# Patient Record
Sex: MALE | Race: ASIAN | NOT HISPANIC OR LATINO | URBAN - METROPOLITAN AREA
[De-identification: names, ages, dates, MRNs, and addresses within clinical notes are randomized per-mention and may not be internally consistent; named-entity substitution may affect disease eponyms.]

---

## 2017-06-16 ENCOUNTER — EMERGENCY (EMERGENCY)
Facility: HOSPITAL | Age: 77
LOS: 1 days | Discharge: HOME | End: 2017-06-16
Attending: EMERGENCY MEDICINE | Admitting: EMERGENCY MEDICINE
Payer: SELF-PAY

## 2017-06-16 ENCOUNTER — APPOINTMENT (OUTPATIENT)
Dept: OPHTHALMOLOGY | Facility: CLINIC | Age: 77
End: 2017-06-16

## 2017-06-16 VITALS
OXYGEN SATURATION: 100 % | RESPIRATION RATE: 15 BRPM | HEART RATE: 84 BPM | DIASTOLIC BLOOD PRESSURE: 82 MMHG | SYSTOLIC BLOOD PRESSURE: 135 MMHG | TEMPERATURE: 98 F

## 2017-06-16 DIAGNOSIS — Z95.1 PRESENCE OF AORTOCORONARY BYPASS GRAFT: Chronic | ICD-10-CM

## 2017-06-16 PROCEDURE — 99284 EMERGENCY DEPT VISIT MOD MDM: CPT

## 2017-06-16 RX ORDER — ACETAMINOPHEN 500 MG
650 TABLET ORAL EVERY 6 HOURS
Qty: 0 | Refills: 0 | Status: DISCONTINUED | OUTPATIENT
Start: 2017-06-16 | End: 2017-06-19

## 2017-06-16 RX ADMIN — Medication 650 MILLIGRAM(S): at 12:45

## 2017-06-16 RX ADMIN — Medication 650 MILLIGRAM(S): at 12:15

## 2017-06-16 NOTE — ED PROVIDER NOTE - OBJECTIVE STATEMENT
Pt speaks Mohawk. Pt requested daughter Beryl to translate.   77y M with hx of glaucoma (blind in the R eye), CABG presents to ED with L eye pain, discharge, itchiness and blurry vision since last night. Pt states he couldn't sleep last night. Notes he does not wear contact but wears glasses. Use to smoke and drink EtOH. Pt states he takes Azital? for the eye.   PMD: Dr. Weller

## 2017-06-16 NOTE — ED PROVIDER NOTE - PROGRESS NOTE DETAILS
I Ibrahima Krishna am scribing for and in the presence of Nakita Vallejo (Attending) the following section HPI, ROS, PAST MEDICAL/SURGICAL/FAMILY/SOCIAL HISTORY, PHYSICAL EXAM, and DISPOSITION. JACKY Haji: Pt signed out to me by chas MIRANDA. Plan to follow up opthal consult. Dr Vallejo spoke to opthal. Recommends to discharge pt from ED and pt to report to [address] [place] [address] [address]- opthal clinic. Pt agrees with plan.

## 2017-07-24 ENCOUNTER — APPOINTMENT (OUTPATIENT)
Dept: OPHTHALMOLOGY | Facility: CLINIC | Age: 77
End: 2017-07-24

## 2017-08-23 ENCOUNTER — APPOINTMENT (OUTPATIENT)
Dept: OPHTHALMOLOGY | Facility: CLINIC | Age: 77
End: 2017-08-23

## 2017-09-11 ENCOUNTER — APPOINTMENT (OUTPATIENT)
Dept: OPHTHALMOLOGY | Facility: CLINIC | Age: 77
End: 2017-09-11

## 2018-10-05 ENCOUNTER — APPOINTMENT (OUTPATIENT)
Dept: OPHTHALMOLOGY | Facility: CLINIC | Age: 78
End: 2018-10-05

## 2019-01-22 ENCOUNTER — APPOINTMENT (OUTPATIENT)
Dept: OPHTHALMOLOGY | Facility: CLINIC | Age: 79
End: 2019-01-22

## 2020-01-01 ENCOUNTER — TRANSCRIPTION ENCOUNTER (OUTPATIENT)
Age: 80
End: 2020-01-01

## 2020-01-01 ENCOUNTER — INPATIENT (INPATIENT)
Facility: HOSPITAL | Age: 80
LOS: 6 days | Discharge: HOME | End: 2020-11-18
Attending: INTERNAL MEDICINE | Admitting: INTERNAL MEDICINE
Payer: MEDICAID

## 2020-01-01 VITALS
OXYGEN SATURATION: 98 % | DIASTOLIC BLOOD PRESSURE: 46 MMHG | HEART RATE: 89 BPM | HEIGHT: 68 IN | TEMPERATURE: 98 F | RESPIRATION RATE: 16 BRPM | SYSTOLIC BLOOD PRESSURE: 78 MMHG

## 2020-01-01 VITALS — WEIGHT: 141.32 LBS

## 2020-01-01 DIAGNOSIS — H40.9 UNSPECIFIED GLAUCOMA: ICD-10-CM

## 2020-01-01 DIAGNOSIS — Z95.1 PRESENCE OF AORTOCORONARY BYPASS GRAFT: Chronic | ICD-10-CM

## 2020-01-01 DIAGNOSIS — N17.9 ACUTE KIDNEY FAILURE, UNSPECIFIED: ICD-10-CM

## 2020-01-01 DIAGNOSIS — I25.10 ATHEROSCLEROTIC HEART DISEASE OF NATIVE CORONARY ARTERY WITHOUT ANGINA PECTORIS: ICD-10-CM

## 2020-01-01 DIAGNOSIS — I95.9 HYPOTENSION, UNSPECIFIED: ICD-10-CM

## 2020-01-01 DIAGNOSIS — R55 SYNCOPE AND COLLAPSE: ICD-10-CM

## 2020-01-01 DIAGNOSIS — Z29.9 ENCOUNTER FOR PROPHYLACTIC MEASURES, UNSPECIFIED: ICD-10-CM

## 2020-01-01 DIAGNOSIS — N18.30 CHRONIC KIDNEY DISEASE, STAGE 3 UNSPECIFIED: ICD-10-CM

## 2020-01-01 DIAGNOSIS — J44.9 CHRONIC OBSTRUCTIVE PULMONARY DISEASE, UNSPECIFIED: ICD-10-CM

## 2020-01-01 LAB
ALBUMIN SERPL ELPH-MCNC: 3 G/DL — LOW (ref 3.3–5)
ALBUMIN SERPL ELPH-MCNC: 3.9 G/DL — SIGNIFICANT CHANGE UP (ref 3.3–5)
ALP SERPL-CCNC: 51 U/L — SIGNIFICANT CHANGE UP (ref 40–120)
ALP SERPL-CCNC: 62 U/L — SIGNIFICANT CHANGE UP (ref 40–120)
ALT FLD-CCNC: 10 U/L — SIGNIFICANT CHANGE UP (ref 4–41)
ALT FLD-CCNC: 10 U/L — SIGNIFICANT CHANGE UP (ref 4–41)
ANION GAP SERPL CALC-SCNC: 10 MMO/L — SIGNIFICANT CHANGE UP (ref 7–14)
ANION GAP SERPL CALC-SCNC: 10 MMO/L — SIGNIFICANT CHANGE UP (ref 7–14)
ANION GAP SERPL CALC-SCNC: 11 MMO/L — SIGNIFICANT CHANGE UP (ref 7–14)
ANION GAP SERPL CALC-SCNC: 12 MMO/L — SIGNIFICANT CHANGE UP (ref 7–14)
ANION GAP SERPL CALC-SCNC: 12 MMO/L — SIGNIFICANT CHANGE UP (ref 7–14)
ANION GAP SERPL CALC-SCNC: 13 MMO/L — SIGNIFICANT CHANGE UP (ref 7–14)
ANION GAP SERPL CALC-SCNC: 14 MMO/L — SIGNIFICANT CHANGE UP (ref 7–14)
APPEARANCE UR: CLEAR — SIGNIFICANT CHANGE UP
AST SERPL-CCNC: 19 U/L — SIGNIFICANT CHANGE UP (ref 4–40)
AST SERPL-CCNC: 23 U/L — SIGNIFICANT CHANGE UP (ref 4–40)
BACTERIA # UR AUTO: NEGATIVE — SIGNIFICANT CHANGE UP
BASE EXCESS BLDV CALC-SCNC: -5.2 MMOL/L — SIGNIFICANT CHANGE UP
BASOPHILS # BLD AUTO: 0.02 K/UL — SIGNIFICANT CHANGE UP (ref 0–0.2)
BASOPHILS # BLD AUTO: 0.06 K/UL — SIGNIFICANT CHANGE UP (ref 0–0.2)
BASOPHILS NFR BLD AUTO: 0.4 % — SIGNIFICANT CHANGE UP (ref 0–2)
BASOPHILS NFR BLD AUTO: 0.7 % — SIGNIFICANT CHANGE UP (ref 0–2)
BILIRUB SERPL-MCNC: 0.5 MG/DL — SIGNIFICANT CHANGE UP (ref 0.2–1.2)
BILIRUB SERPL-MCNC: 0.6 MG/DL — SIGNIFICANT CHANGE UP (ref 0.2–1.2)
BILIRUB UR-MCNC: NEGATIVE — SIGNIFICANT CHANGE UP
BLOOD GAS VENOUS - CREATININE: 2.99 MG/DL — HIGH (ref 0.5–1.3)
BLOOD UR QL VISUAL: NEGATIVE — SIGNIFICANT CHANGE UP
BUN SERPL-MCNC: 22 MG/DL — SIGNIFICANT CHANGE UP (ref 7–23)
BUN SERPL-MCNC: 24 MG/DL — HIGH (ref 7–23)
BUN SERPL-MCNC: 25 MG/DL — HIGH (ref 7–23)
BUN SERPL-MCNC: 25 MG/DL — HIGH (ref 7–23)
BUN SERPL-MCNC: 27 MG/DL — HIGH (ref 7–23)
BUN SERPL-MCNC: 28 MG/DL — HIGH (ref 7–23)
BUN SERPL-MCNC: 30 MG/DL — HIGH (ref 7–23)
BUN SERPL-MCNC: 31 MG/DL — HIGH (ref 7–23)
BUN SERPL-MCNC: 32 MG/DL — HIGH (ref 7–23)
CALCIUM SERPL-MCNC: 8.2 MG/DL — LOW (ref 8.4–10.5)
CALCIUM SERPL-MCNC: 8.4 MG/DL — SIGNIFICANT CHANGE UP (ref 8.4–10.5)
CALCIUM SERPL-MCNC: 8.9 MG/DL — SIGNIFICANT CHANGE UP (ref 8.4–10.5)
CALCIUM SERPL-MCNC: 9 MG/DL — SIGNIFICANT CHANGE UP (ref 8.4–10.5)
CALCIUM SERPL-MCNC: 9.2 MG/DL — SIGNIFICANT CHANGE UP (ref 8.4–10.5)
CALCIUM SERPL-MCNC: 9.2 MG/DL — SIGNIFICANT CHANGE UP (ref 8.4–10.5)
CALCIUM SERPL-MCNC: 9.3 MG/DL — SIGNIFICANT CHANGE UP (ref 8.4–10.5)
CHLORIDE BLDV-SCNC: 109 MMOL/L — HIGH (ref 96–108)
CHLORIDE SERPL-SCNC: 103 MMOL/L — SIGNIFICANT CHANGE UP (ref 98–107)
CHLORIDE SERPL-SCNC: 107 MMOL/L — SIGNIFICANT CHANGE UP (ref 98–107)
CHLORIDE SERPL-SCNC: 107 MMOL/L — SIGNIFICANT CHANGE UP (ref 98–107)
CHLORIDE SERPL-SCNC: 108 MMOL/L — HIGH (ref 98–107)
CHLORIDE SERPL-SCNC: 108 MMOL/L — HIGH (ref 98–107)
CHLORIDE SERPL-SCNC: 109 MMOL/L — HIGH (ref 98–107)
CHLORIDE SERPL-SCNC: 109 MMOL/L — HIGH (ref 98–107)
CHLORIDE SERPL-SCNC: 110 MMOL/L — HIGH (ref 98–107)
CHLORIDE SERPL-SCNC: 110 MMOL/L — HIGH (ref 98–107)
CK MB BLD-MCNC: 2.88 NG/ML — SIGNIFICANT CHANGE UP (ref 1–6.6)
CK MB BLD-MCNC: 3.08 NG/ML — SIGNIFICANT CHANGE UP (ref 1–6.6)
CK MB BLD-MCNC: 3.31 NG/ML — SIGNIFICANT CHANGE UP (ref 1–6.6)
CK MB BLD-MCNC: SIGNIFICANT CHANGE UP (ref 0–2.5)
CK MB BLD-MCNC: SIGNIFICANT CHANGE UP (ref 0–2.5)
CK SERPL-CCNC: 68 U/L — SIGNIFICANT CHANGE UP (ref 30–200)
CK SERPL-CCNC: 72 U/L — SIGNIFICANT CHANGE UP (ref 30–200)
CK SERPL-CCNC: 89 U/L — SIGNIFICANT CHANGE UP (ref 30–200)
CO2 SERPL-SCNC: 16 MMOL/L — LOW (ref 22–31)
CO2 SERPL-SCNC: 17 MMOL/L — LOW (ref 22–31)
CO2 SERPL-SCNC: 18 MMOL/L — LOW (ref 22–31)
CO2 SERPL-SCNC: 19 MMOL/L — LOW (ref 22–31)
COLOR SPEC: SIGNIFICANT CHANGE UP
CREAT SERPL-MCNC: 2.26 MG/DL — HIGH (ref 0.5–1.3)
CREAT SERPL-MCNC: 2.28 MG/DL — HIGH (ref 0.5–1.3)
CREAT SERPL-MCNC: 2.44 MG/DL — HIGH (ref 0.5–1.3)
CREAT SERPL-MCNC: 2.47 MG/DL — HIGH (ref 0.5–1.3)
CREAT SERPL-MCNC: 2.51 MG/DL — HIGH (ref 0.5–1.3)
CREAT SERPL-MCNC: 2.67 MG/DL — HIGH (ref 0.5–1.3)
CREAT SERPL-MCNC: 2.71 MG/DL — HIGH (ref 0.5–1.3)
CREAT SERPL-MCNC: 2.78 MG/DL — HIGH (ref 0.5–1.3)
CREAT SERPL-MCNC: 2.98 MG/DL — HIGH (ref 0.5–1.3)
EOSINOPHIL # BLD AUTO: 0.41 K/UL — SIGNIFICANT CHANGE UP (ref 0–0.5)
EOSINOPHIL # BLD AUTO: 0.63 K/UL — HIGH (ref 0–0.5)
EOSINOPHIL NFR BLD AUTO: 7 % — HIGH (ref 0–6)
EOSINOPHIL NFR BLD AUTO: 7.2 % — HIGH (ref 0–6)
FOLATE SERPL-MCNC: 7.3 NG/ML — SIGNIFICANT CHANGE UP (ref 4.7–20)
GAS PNL BLDV: 132 MMOL/L — LOW (ref 136–146)
GLUCOSE BLDV-MCNC: 97 MG/DL — SIGNIFICANT CHANGE UP (ref 70–99)
GLUCOSE SERPL-MCNC: 109 MG/DL — HIGH (ref 70–99)
GLUCOSE SERPL-MCNC: 128 MG/DL — HIGH (ref 70–99)
GLUCOSE SERPL-MCNC: 74 MG/DL — SIGNIFICANT CHANGE UP (ref 70–99)
GLUCOSE SERPL-MCNC: 79 MG/DL — SIGNIFICANT CHANGE UP (ref 70–99)
GLUCOSE SERPL-MCNC: 83 MG/DL — SIGNIFICANT CHANGE UP (ref 70–99)
GLUCOSE SERPL-MCNC: 91 MG/DL — SIGNIFICANT CHANGE UP (ref 70–99)
GLUCOSE SERPL-MCNC: 92 MG/DL — SIGNIFICANT CHANGE UP (ref 70–99)
GLUCOSE SERPL-MCNC: 94 MG/DL — SIGNIFICANT CHANGE UP (ref 70–99)
GLUCOSE SERPL-MCNC: 95 MG/DL — SIGNIFICANT CHANGE UP (ref 70–99)
GLUCOSE UR-MCNC: NEGATIVE — SIGNIFICANT CHANGE UP
HCO3 BLDV-SCNC: 19 MMOL/L — LOW (ref 20–27)
HCT VFR BLD CALC: 25.8 % — LOW (ref 39–50)
HCT VFR BLD CALC: 28.1 % — LOW (ref 39–50)
HCT VFR BLD CALC: 30.2 % — LOW (ref 39–50)
HCT VFR BLD CALC: 30.6 % — LOW (ref 39–50)
HCT VFR BLD CALC: 31.1 % — LOW (ref 39–50)
HCT VFR BLD CALC: 31.2 % — LOW (ref 39–50)
HCT VFR BLD CALC: 31.2 % — LOW (ref 39–50)
HCT VFR BLD CALC: 32.5 % — LOW (ref 39–50)
HCT VFR BLD CALC: 33.5 % — LOW (ref 39–50)
HCT VFR BLDV CALC: 34.2 % — LOW (ref 39–51)
HCYS SERPL-MCNC: 26.3 UMOL/L — HIGH
HGB BLD-MCNC: 10.8 G/DL — LOW (ref 13–17)
HGB BLD-MCNC: 8.1 G/DL — LOW (ref 13–17)
HGB BLD-MCNC: 8.9 G/DL — LOW (ref 13–17)
HGB BLD-MCNC: 9.4 G/DL — LOW (ref 13–17)
HGB BLD-MCNC: 9.5 G/DL — LOW (ref 13–17)
HGB BLD-MCNC: 9.6 G/DL — LOW (ref 13–17)
HGB BLD-MCNC: 9.7 G/DL — LOW (ref 13–17)
HGB BLD-MCNC: 9.7 G/DL — LOW (ref 13–17)
HGB BLD-MCNC: 9.8 G/DL — LOW (ref 13–17)
HGB BLDV-MCNC: 11.1 G/DL — LOW (ref 13–17)
HYALINE CASTS # UR AUTO: NEGATIVE — SIGNIFICANT CHANGE UP
IMM GRANULOCYTES NFR BLD AUTO: 1.4 % — SIGNIFICANT CHANGE UP (ref 0–1.5)
IMM GRANULOCYTES NFR BLD AUTO: 2.6 % — HIGH (ref 0–1.5)
KETONES UR-MCNC: NEGATIVE — SIGNIFICANT CHANGE UP
LACTATE BLDV-MCNC: 1.4 MMOL/L — SIGNIFICANT CHANGE UP (ref 0.5–2)
LEUKOCYTE ESTERASE UR-ACNC: NEGATIVE — SIGNIFICANT CHANGE UP
LYMPHOCYTES # BLD AUTO: 1.36 K/UL — SIGNIFICANT CHANGE UP (ref 1–3.3)
LYMPHOCYTES # BLD AUTO: 2.06 K/UL — SIGNIFICANT CHANGE UP (ref 1–3.3)
LYMPHOCYTES # BLD AUTO: 23 % — SIGNIFICANT CHANGE UP (ref 13–44)
LYMPHOCYTES # BLD AUTO: 24 % — SIGNIFICANT CHANGE UP (ref 13–44)
MAGNESIUM SERPL-MCNC: 1.7 MG/DL — SIGNIFICANT CHANGE UP (ref 1.6–2.6)
MAGNESIUM SERPL-MCNC: 1.8 MG/DL — SIGNIFICANT CHANGE UP (ref 1.6–2.6)
MAGNESIUM SERPL-MCNC: 2 MG/DL — SIGNIFICANT CHANGE UP (ref 1.6–2.6)
MAGNESIUM SERPL-MCNC: 2.1 MG/DL — SIGNIFICANT CHANGE UP (ref 1.6–2.6)
MAGNESIUM SERPL-MCNC: 2.3 MG/DL — SIGNIFICANT CHANGE UP (ref 1.6–2.6)
MCHC RBC-ENTMCNC: 28.4 PG — SIGNIFICANT CHANGE UP (ref 27–34)
MCHC RBC-ENTMCNC: 28.5 PG — SIGNIFICANT CHANGE UP (ref 27–34)
MCHC RBC-ENTMCNC: 28.7 PG — SIGNIFICANT CHANGE UP (ref 27–34)
MCHC RBC-ENTMCNC: 28.9 PG — SIGNIFICANT CHANGE UP (ref 27–34)
MCHC RBC-ENTMCNC: 29 PG — SIGNIFICANT CHANGE UP (ref 27–34)
MCHC RBC-ENTMCNC: 29.1 PG — SIGNIFICANT CHANGE UP (ref 27–34)
MCHC RBC-ENTMCNC: 29.3 PG — SIGNIFICANT CHANGE UP (ref 27–34)
MCHC RBC-ENTMCNC: 30.2 % — LOW (ref 32–36)
MCHC RBC-ENTMCNC: 30.5 % — LOW (ref 32–36)
MCHC RBC-ENTMCNC: 30.7 % — LOW (ref 32–36)
MCHC RBC-ENTMCNC: 31.1 % — LOW (ref 32–36)
MCHC RBC-ENTMCNC: 31.1 % — LOW (ref 32–36)
MCHC RBC-ENTMCNC: 31.4 % — LOW (ref 32–36)
MCHC RBC-ENTMCNC: 31.7 % — LOW (ref 32–36)
MCHC RBC-ENTMCNC: 31.8 % — LOW (ref 32–36)
MCHC RBC-ENTMCNC: 32.2 % — SIGNIFICANT CHANGE UP (ref 32–36)
MCV RBC AUTO: 91 FL — SIGNIFICANT CHANGE UP (ref 80–100)
MCV RBC AUTO: 91.5 FL — SIGNIFICANT CHANGE UP (ref 80–100)
MCV RBC AUTO: 92.1 FL — SIGNIFICANT CHANGE UP (ref 80–100)
MCV RBC AUTO: 93.4 FL — SIGNIFICANT CHANGE UP (ref 80–100)
MCV RBC AUTO: 93.4 FL — SIGNIFICANT CHANGE UP (ref 80–100)
MCV RBC AUTO: 93.6 FL — SIGNIFICANT CHANGE UP (ref 80–100)
MCV RBC AUTO: 96.2 FL — SIGNIFICANT CHANGE UP (ref 80–100)
METHYLMALONATE SERPL-SCNC: 799 NMOL/L — HIGH (ref 0–378)
MONOCYTES # BLD AUTO: 0.47 K/UL — SIGNIFICANT CHANGE UP (ref 0–0.9)
MONOCYTES # BLD AUTO: 0.79 K/UL — SIGNIFICANT CHANGE UP (ref 0–0.9)
MONOCYTES NFR BLD AUTO: 8.3 % — SIGNIFICANT CHANGE UP (ref 2–14)
MONOCYTES NFR BLD AUTO: 8.8 % — SIGNIFICANT CHANGE UP (ref 2–14)
NEUTROPHILS # BLD AUTO: 3.32 K/UL — SIGNIFICANT CHANGE UP (ref 1.8–7.4)
NEUTROPHILS # BLD AUTO: 5.18 K/UL — SIGNIFICANT CHANGE UP (ref 1.8–7.4)
NEUTROPHILS NFR BLD AUTO: 57.9 % — SIGNIFICANT CHANGE UP (ref 43–77)
NEUTROPHILS NFR BLD AUTO: 58.7 % — SIGNIFICANT CHANGE UP (ref 43–77)
NITRITE UR-MCNC: NEGATIVE — SIGNIFICANT CHANGE UP
NRBC # FLD: 0 K/UL — SIGNIFICANT CHANGE UP (ref 0–0)
NT-PROBNP SERPL-SCNC: 1034 PG/ML — SIGNIFICANT CHANGE UP
PCO2 BLDV: 44 MMHG — SIGNIFICANT CHANGE UP (ref 41–51)
PH BLDV: 7.29 PH — LOW (ref 7.32–7.43)
PH UR: 6.5 — SIGNIFICANT CHANGE UP (ref 5–8)
PHOSPHATE SERPL-MCNC: 3 MG/DL — SIGNIFICANT CHANGE UP (ref 2.5–4.5)
PHOSPHATE SERPL-MCNC: 3.2 MG/DL — SIGNIFICANT CHANGE UP (ref 2.5–4.5)
PHOSPHATE SERPL-MCNC: 3.7 MG/DL — SIGNIFICANT CHANGE UP (ref 2.5–4.5)
PHOSPHATE SERPL-MCNC: 4.1 MG/DL — SIGNIFICANT CHANGE UP (ref 2.5–4.5)
PHOSPHATE SERPL-MCNC: 4.3 MG/DL — SIGNIFICANT CHANGE UP (ref 2.5–4.5)
PLATELET # BLD AUTO: 161 K/UL — SIGNIFICANT CHANGE UP (ref 150–400)
PLATELET # BLD AUTO: 166 K/UL — SIGNIFICANT CHANGE UP (ref 150–400)
PLATELET # BLD AUTO: 179 K/UL — SIGNIFICANT CHANGE UP (ref 150–400)
PLATELET # BLD AUTO: 180 K/UL — SIGNIFICANT CHANGE UP (ref 150–400)
PLATELET # BLD AUTO: 182 K/UL — SIGNIFICANT CHANGE UP (ref 150–400)
PLATELET # BLD AUTO: 186 K/UL — SIGNIFICANT CHANGE UP (ref 150–400)
PLATELET # BLD AUTO: 187 K/UL — SIGNIFICANT CHANGE UP (ref 150–400)
PLATELET # BLD AUTO: 188 K/UL — SIGNIFICANT CHANGE UP (ref 150–400)
PLATELET # BLD AUTO: 250 K/UL — SIGNIFICANT CHANGE UP (ref 150–400)
PMV BLD: 8.8 FL — SIGNIFICANT CHANGE UP (ref 7–13)
PMV BLD: 9 FL — SIGNIFICANT CHANGE UP (ref 7–13)
PMV BLD: 9.1 FL — SIGNIFICANT CHANGE UP (ref 7–13)
PMV BLD: 9.3 FL — SIGNIFICANT CHANGE UP (ref 7–13)
PMV BLD: 9.4 FL — SIGNIFICANT CHANGE UP (ref 7–13)
PMV BLD: 9.5 FL — SIGNIFICANT CHANGE UP (ref 7–13)
PMV BLD: 9.5 FL — SIGNIFICANT CHANGE UP (ref 7–13)
PMV BLD: 9.6 FL — SIGNIFICANT CHANGE UP (ref 7–13)
PMV BLD: 9.6 FL — SIGNIFICANT CHANGE UP (ref 7–13)
PO2 BLDV: < 24 MMHG — LOW (ref 35–40)
POTASSIUM BLDV-SCNC: 3.6 MMOL/L — SIGNIFICANT CHANGE UP (ref 3.4–4.5)
POTASSIUM SERPL-MCNC: 3.3 MMOL/L — LOW (ref 3.5–5.3)
POTASSIUM SERPL-MCNC: 3.5 MMOL/L — SIGNIFICANT CHANGE UP (ref 3.5–5.3)
POTASSIUM SERPL-MCNC: 3.6 MMOL/L — SIGNIFICANT CHANGE UP (ref 3.5–5.3)
POTASSIUM SERPL-MCNC: 3.7 MMOL/L — SIGNIFICANT CHANGE UP (ref 3.5–5.3)
POTASSIUM SERPL-MCNC: 3.8 MMOL/L — SIGNIFICANT CHANGE UP (ref 3.5–5.3)
POTASSIUM SERPL-MCNC: 3.8 MMOL/L — SIGNIFICANT CHANGE UP (ref 3.5–5.3)
POTASSIUM SERPL-MCNC: 3.9 MMOL/L — SIGNIFICANT CHANGE UP (ref 3.5–5.3)
POTASSIUM SERPL-SCNC: 3.3 MMOL/L — LOW (ref 3.5–5.3)
POTASSIUM SERPL-SCNC: 3.5 MMOL/L — SIGNIFICANT CHANGE UP (ref 3.5–5.3)
POTASSIUM SERPL-SCNC: 3.6 MMOL/L — SIGNIFICANT CHANGE UP (ref 3.5–5.3)
POTASSIUM SERPL-SCNC: 3.7 MMOL/L — SIGNIFICANT CHANGE UP (ref 3.5–5.3)
POTASSIUM SERPL-SCNC: 3.8 MMOL/L — SIGNIFICANT CHANGE UP (ref 3.5–5.3)
POTASSIUM SERPL-SCNC: 3.8 MMOL/L — SIGNIFICANT CHANGE UP (ref 3.5–5.3)
POTASSIUM SERPL-SCNC: 3.9 MMOL/L — SIGNIFICANT CHANGE UP (ref 3.5–5.3)
PROT SERPL-MCNC: 5.5 G/DL — LOW (ref 6–8.3)
PROT SERPL-MCNC: 6.6 G/DL — SIGNIFICANT CHANGE UP (ref 6–8.3)
PROT UR-MCNC: 50 — SIGNIFICANT CHANGE UP
PTH-INTACT SERPL-MCNC: 23.75 PG/ML — SIGNIFICANT CHANGE UP (ref 15–65)
RBC # BLD: 2.8 M/UL — LOW (ref 4.2–5.8)
RBC # BLD: 3.07 M/UL — LOW (ref 4.2–5.8)
RBC # BLD: 3.27 M/UL — LOW (ref 4.2–5.8)
RBC # BLD: 3.3 M/UL — LOW (ref 4.2–5.8)
RBC # BLD: 3.33 M/UL — LOW (ref 4.2–5.8)
RBC # BLD: 3.34 M/UL — LOW (ref 4.2–5.8)
RBC # BLD: 3.38 M/UL — LOW (ref 4.2–5.8)
RBC # BLD: 3.41 M/UL — LOW (ref 4.2–5.8)
RBC # BLD: 3.68 M/UL — LOW (ref 4.2–5.8)
RBC # FLD: 14.5 % — SIGNIFICANT CHANGE UP (ref 10.3–14.5)
RBC # FLD: 14.6 % — HIGH (ref 10.3–14.5)
RBC # FLD: 14.6 % — HIGH (ref 10.3–14.5)
RBC # FLD: 14.7 % — HIGH (ref 10.3–14.5)
RBC # FLD: 14.8 % — HIGH (ref 10.3–14.5)
RBC # FLD: 14.9 % — HIGH (ref 10.3–14.5)
RBC # FLD: 14.9 % — HIGH (ref 10.3–14.5)
RBC CASTS # UR COMP ASSIST: SIGNIFICANT CHANGE UP (ref 0–?)
SAO2 % BLDV: 19.4 % — LOW (ref 60–85)
SARS-COV-2 RNA SPEC QL NAA+PROBE: SIGNIFICANT CHANGE UP
SODIUM SERPL-SCNC: 134 MMOL/L — LOW (ref 135–145)
SODIUM SERPL-SCNC: 135 MMOL/L — SIGNIFICANT CHANGE UP (ref 135–145)
SODIUM SERPL-SCNC: 135 MMOL/L — SIGNIFICANT CHANGE UP (ref 135–145)
SODIUM SERPL-SCNC: 136 MMOL/L — SIGNIFICANT CHANGE UP (ref 135–145)
SODIUM SERPL-SCNC: 136 MMOL/L — SIGNIFICANT CHANGE UP (ref 135–145)
SODIUM SERPL-SCNC: 137 MMOL/L — SIGNIFICANT CHANGE UP (ref 135–145)
SODIUM SERPL-SCNC: 137 MMOL/L — SIGNIFICANT CHANGE UP (ref 135–145)
SODIUM SERPL-SCNC: 138 MMOL/L — SIGNIFICANT CHANGE UP (ref 135–145)
SODIUM SERPL-SCNC: 139 MMOL/L — SIGNIFICANT CHANGE UP (ref 135–145)
SP GR SPEC: 1.01 — SIGNIFICANT CHANGE UP (ref 1–1.04)
SQUAMOUS # UR AUTO: SIGNIFICANT CHANGE UP
TROPONIN T, HIGH SENSITIVITY: 114 NG/L — CRITICAL HIGH (ref ?–14)
TROPONIN T, HIGH SENSITIVITY: 89 NG/L — CRITICAL HIGH (ref ?–14)
TROPONIN T, HIGH SENSITIVITY: 91 NG/L — CRITICAL HIGH (ref ?–14)
TROPONIN T, HIGH SENSITIVITY: 93 NG/L — CRITICAL HIGH (ref ?–14)
TROPONIN T, HIGH SENSITIVITY: 95 NG/L — CRITICAL HIGH (ref ?–14)
UROBILINOGEN FLD QL: NORMAL — SIGNIFICANT CHANGE UP
VIT B12 SERPL-MCNC: 247 PG/ML — SIGNIFICANT CHANGE UP (ref 200–900)
WBC # BLD: 5.66 K/UL — SIGNIFICANT CHANGE UP (ref 3.8–10.5)
WBC # BLD: 5.71 K/UL — SIGNIFICANT CHANGE UP (ref 3.8–10.5)
WBC # BLD: 5.77 K/UL — SIGNIFICANT CHANGE UP (ref 3.8–10.5)
WBC # BLD: 5.99 K/UL — SIGNIFICANT CHANGE UP (ref 3.8–10.5)
WBC # BLD: 6.38 K/UL — SIGNIFICANT CHANGE UP (ref 3.8–10.5)
WBC # BLD: 6.66 K/UL — SIGNIFICANT CHANGE UP (ref 3.8–10.5)
WBC # BLD: 6.67 K/UL — SIGNIFICANT CHANGE UP (ref 3.8–10.5)
WBC # BLD: 7.28 K/UL — SIGNIFICANT CHANGE UP (ref 3.8–10.5)
WBC # BLD: 8.95 K/UL — SIGNIFICANT CHANGE UP (ref 3.8–10.5)
WBC # FLD AUTO: 5.66 K/UL — SIGNIFICANT CHANGE UP (ref 3.8–10.5)
WBC # FLD AUTO: 5.71 K/UL — SIGNIFICANT CHANGE UP (ref 3.8–10.5)
WBC # FLD AUTO: 5.77 K/UL — SIGNIFICANT CHANGE UP (ref 3.8–10.5)
WBC # FLD AUTO: 5.99 K/UL — SIGNIFICANT CHANGE UP (ref 3.8–10.5)
WBC # FLD AUTO: 6.38 K/UL — SIGNIFICANT CHANGE UP (ref 3.8–10.5)
WBC # FLD AUTO: 6.66 K/UL — SIGNIFICANT CHANGE UP (ref 3.8–10.5)
WBC # FLD AUTO: 6.67 K/UL — SIGNIFICANT CHANGE UP (ref 3.8–10.5)
WBC # FLD AUTO: 7.28 K/UL — SIGNIFICANT CHANGE UP (ref 3.8–10.5)
WBC # FLD AUTO: 8.95 K/UL — SIGNIFICANT CHANGE UP (ref 3.8–10.5)
WBC UR QL: SIGNIFICANT CHANGE UP (ref 0–?)

## 2020-01-01 PROCEDURE — 99223 1ST HOSP IP/OBS HIGH 75: CPT

## 2020-01-01 PROCEDURE — 71046 X-RAY EXAM CHEST 2 VIEWS: CPT | Mod: 26

## 2020-01-01 PROCEDURE — 99284 EMERGENCY DEPT VISIT MOD MDM: CPT

## 2020-01-01 PROCEDURE — 76770 US EXAM ABDO BACK WALL COMP: CPT | Mod: 26

## 2020-01-01 PROCEDURE — 93018 CV STRESS TEST I&R ONLY: CPT | Mod: GC

## 2020-01-01 PROCEDURE — 78452 HT MUSCLE IMAGE SPECT MULT: CPT | Mod: 26

## 2020-01-01 PROCEDURE — 99222 1ST HOSP IP/OBS MODERATE 55: CPT | Mod: GC

## 2020-01-01 PROCEDURE — 70450 CT HEAD/BRAIN W/O DYE: CPT | Mod: 26

## 2020-01-01 PROCEDURE — 93306 TTE W/DOPPLER COMPLETE: CPT | Mod: 26

## 2020-01-01 PROCEDURE — 93016 CV STRESS TEST SUPVJ ONLY: CPT | Mod: GC

## 2020-01-01 RX ORDER — ASPIRIN/CALCIUM CARB/MAGNESIUM 324 MG
81 TABLET ORAL DAILY
Refills: 0 | Status: DISCONTINUED | OUTPATIENT
Start: 2020-01-01 | End: 2020-01-01

## 2020-01-01 RX ORDER — HEPARIN SODIUM 5000 [USP'U]/ML
5000 INJECTION INTRAVENOUS; SUBCUTANEOUS EVERY 8 HOURS
Refills: 0 | Status: DISCONTINUED | OUTPATIENT
Start: 2020-01-01 | End: 2020-01-01

## 2020-01-01 RX ORDER — CALCITRIOL 0.5 UG/1
0.25 CAPSULE ORAL DAILY
Refills: 0 | Status: DISCONTINUED | OUTPATIENT
Start: 2020-01-01 | End: 2020-01-01

## 2020-01-01 RX ORDER — POTASSIUM CHLORIDE 20 MEQ
40 PACKET (EA) ORAL ONCE
Refills: 0 | Status: COMPLETED | OUTPATIENT
Start: 2020-01-01 | End: 2020-01-01

## 2020-01-01 RX ORDER — SODIUM CHLORIDE 9 MG/ML
1000 INJECTION INTRAMUSCULAR; INTRAVENOUS; SUBCUTANEOUS
Refills: 0 | Status: DISCONTINUED | OUTPATIENT
Start: 2020-01-01 | End: 2020-01-01

## 2020-01-01 RX ORDER — BUDESONIDE AND FORMOTEROL FUMARATE DIHYDRATE 160; 4.5 UG/1; UG/1
2 AEROSOL RESPIRATORY (INHALATION)
Refills: 0 | Status: DISCONTINUED | OUTPATIENT
Start: 2020-01-01 | End: 2020-01-01

## 2020-01-01 RX ORDER — HYDRALAZINE HCL 50 MG
2.5 TABLET ORAL ONCE
Refills: 0 | Status: COMPLETED | OUTPATIENT
Start: 2020-01-01 | End: 2020-01-01

## 2020-01-01 RX ORDER — MIDODRINE HYDROCHLORIDE 2.5 MG/1
2.5 TABLET ORAL THREE TIMES A DAY
Refills: 0 | Status: DISCONTINUED | OUTPATIENT
Start: 2020-01-01 | End: 2020-01-01

## 2020-01-01 RX ORDER — LANOLIN ALCOHOL/MO/W.PET/CERES
3 CREAM (GRAM) TOPICAL AT BEDTIME
Refills: 0 | Status: DISCONTINUED | OUTPATIENT
Start: 2020-01-01 | End: 2020-01-01

## 2020-01-01 RX ORDER — ATORVASTATIN CALCIUM 80 MG/1
40 TABLET, FILM COATED ORAL AT BEDTIME
Refills: 0 | Status: DISCONTINUED | OUTPATIENT
Start: 2020-01-01 | End: 2020-01-01

## 2020-01-01 RX ORDER — SENNA PLUS 8.6 MG/1
2 TABLET ORAL AT BEDTIME
Refills: 0 | Status: DISCONTINUED | OUTPATIENT
Start: 2020-01-01 | End: 2020-01-01

## 2020-01-01 RX ORDER — HYDRALAZINE HCL 50 MG
5 TABLET ORAL ONCE
Refills: 0 | Status: COMPLETED | OUTPATIENT
Start: 2020-01-01 | End: 2020-01-01

## 2020-01-01 RX ORDER — SODIUM CHLORIDE 9 MG/ML
500 INJECTION INTRAMUSCULAR; INTRAVENOUS; SUBCUTANEOUS ONCE
Refills: 0 | Status: COMPLETED | OUTPATIENT
Start: 2020-01-01 | End: 2020-01-01

## 2020-01-01 RX ORDER — MAGNESIUM SULFATE 500 MG/ML
2 VIAL (ML) INJECTION ONCE
Refills: 0 | Status: COMPLETED | OUTPATIENT
Start: 2020-01-01 | End: 2020-01-01

## 2020-01-01 RX ORDER — ACETAMINOPHEN 500 MG
650 TABLET ORAL EVERY 6 HOURS
Refills: 0 | Status: DISCONTINUED | OUTPATIENT
Start: 2020-01-01 | End: 2020-01-01

## 2020-01-01 RX ADMIN — Medication 50 GRAM(S): at 09:54

## 2020-01-01 RX ADMIN — HEPARIN SODIUM 5000 UNIT(S): 5000 INJECTION INTRAVENOUS; SUBCUTANEOUS at 12:17

## 2020-01-01 RX ADMIN — HEPARIN SODIUM 5000 UNIT(S): 5000 INJECTION INTRAVENOUS; SUBCUTANEOUS at 05:15

## 2020-01-01 RX ADMIN — ATORVASTATIN CALCIUM 40 MILLIGRAM(S): 80 TABLET, FILM COATED ORAL at 21:48

## 2020-01-01 RX ADMIN — BUDESONIDE AND FORMOTEROL FUMARATE DIHYDRATE 2 PUFF(S): 160; 4.5 AEROSOL RESPIRATORY (INHALATION) at 21:48

## 2020-01-01 RX ADMIN — BUDESONIDE AND FORMOTEROL FUMARATE DIHYDRATE 2 PUFF(S): 160; 4.5 AEROSOL RESPIRATORY (INHALATION) at 08:15

## 2020-01-01 RX ADMIN — SODIUM CHLORIDE 70 MILLILITER(S): 9 INJECTION INTRAMUSCULAR; INTRAVENOUS; SUBCUTANEOUS at 12:17

## 2020-01-01 RX ADMIN — SODIUM CHLORIDE 100 MILLILITER(S): 9 INJECTION INTRAMUSCULAR; INTRAVENOUS; SUBCUTANEOUS at 10:28

## 2020-01-01 RX ADMIN — SODIUM CHLORIDE 50 MILLILITER(S): 9 INJECTION INTRAMUSCULAR; INTRAVENOUS; SUBCUTANEOUS at 00:02

## 2020-01-01 RX ADMIN — SODIUM CHLORIDE 70 MILLILITER(S): 9 INJECTION INTRAMUSCULAR; INTRAVENOUS; SUBCUTANEOUS at 18:52

## 2020-01-01 RX ADMIN — HEPARIN SODIUM 5000 UNIT(S): 5000 INJECTION INTRAVENOUS; SUBCUTANEOUS at 21:47

## 2020-01-01 RX ADMIN — Medication 40 MILLIEQUIVALENT(S): at 14:02

## 2020-01-01 RX ADMIN — Medication 1 DROP(S): at 12:16

## 2020-01-01 RX ADMIN — Medication 81 MILLIGRAM(S): at 12:16

## 2020-01-01 RX ADMIN — Medication 1 DROP(S): at 11:31

## 2020-01-01 RX ADMIN — Medication 1 DROP(S): at 05:37

## 2020-01-01 RX ADMIN — BUDESONIDE AND FORMOTEROL FUMARATE DIHYDRATE 2 PUFF(S): 160; 4.5 AEROSOL RESPIRATORY (INHALATION) at 11:31

## 2020-01-01 RX ADMIN — Medication 1 DROP(S): at 17:03

## 2020-01-01 RX ADMIN — BUDESONIDE AND FORMOTEROL FUMARATE DIHYDRATE 2 PUFF(S): 160; 4.5 AEROSOL RESPIRATORY (INHALATION) at 21:42

## 2020-01-01 RX ADMIN — Medication 1 DROP(S): at 06:19

## 2020-01-01 RX ADMIN — ATORVASTATIN CALCIUM 40 MILLIGRAM(S): 80 TABLET, FILM COATED ORAL at 21:47

## 2020-01-01 RX ADMIN — CALCITRIOL 0.25 MICROGRAM(S): 0.5 CAPSULE ORAL at 11:55

## 2020-01-01 RX ADMIN — HEPARIN SODIUM 5000 UNIT(S): 5000 INJECTION INTRAVENOUS; SUBCUTANEOUS at 21:17

## 2020-01-01 RX ADMIN — BUDESONIDE AND FORMOTEROL FUMARATE DIHYDRATE 2 PUFF(S): 160; 4.5 AEROSOL RESPIRATORY (INHALATION) at 22:03

## 2020-01-01 RX ADMIN — HEPARIN SODIUM 5000 UNIT(S): 5000 INJECTION INTRAVENOUS; SUBCUTANEOUS at 22:26

## 2020-01-01 RX ADMIN — BUDESONIDE AND FORMOTEROL FUMARATE DIHYDRATE 2 PUFF(S): 160; 4.5 AEROSOL RESPIRATORY (INHALATION) at 21:47

## 2020-01-01 RX ADMIN — HEPARIN SODIUM 5000 UNIT(S): 5000 INJECTION INTRAVENOUS; SUBCUTANEOUS at 05:48

## 2020-01-01 RX ADMIN — BUDESONIDE AND FORMOTEROL FUMARATE DIHYDRATE 2 PUFF(S): 160; 4.5 AEROSOL RESPIRATORY (INHALATION) at 21:13

## 2020-01-01 RX ADMIN — Medication 81 MILLIGRAM(S): at 12:57

## 2020-01-01 RX ADMIN — Medication 2.5 MILLIGRAM(S): at 03:41

## 2020-01-01 RX ADMIN — BUDESONIDE AND FORMOTEROL FUMARATE DIHYDRATE 2 PUFF(S): 160; 4.5 AEROSOL RESPIRATORY (INHALATION) at 11:53

## 2020-01-01 RX ADMIN — HEPARIN SODIUM 5000 UNIT(S): 5000 INJECTION INTRAVENOUS; SUBCUTANEOUS at 05:10

## 2020-01-01 RX ADMIN — SODIUM CHLORIDE 50 MILLILITER(S): 9 INJECTION INTRAMUSCULAR; INTRAVENOUS; SUBCUTANEOUS at 14:42

## 2020-01-01 RX ADMIN — SODIUM CHLORIDE 50 MILLILITER(S): 9 INJECTION INTRAMUSCULAR; INTRAVENOUS; SUBCUTANEOUS at 23:25

## 2020-01-01 RX ADMIN — Medication 81 MILLIGRAM(S): at 11:55

## 2020-01-01 RX ADMIN — CALCITRIOL 0.25 MICROGRAM(S): 0.5 CAPSULE ORAL at 12:57

## 2020-01-01 RX ADMIN — HEPARIN SODIUM 5000 UNIT(S): 5000 INJECTION INTRAVENOUS; SUBCUTANEOUS at 12:42

## 2020-01-01 RX ADMIN — Medication 1 DROP(S): at 17:53

## 2020-01-01 RX ADMIN — Medication 81 MILLIGRAM(S): at 14:03

## 2020-01-01 RX ADMIN — HEPARIN SODIUM 5000 UNIT(S): 5000 INJECTION INTRAVENOUS; SUBCUTANEOUS at 12:58

## 2020-01-01 RX ADMIN — HEPARIN SODIUM 5000 UNIT(S): 5000 INJECTION INTRAVENOUS; SUBCUTANEOUS at 21:41

## 2020-01-01 RX ADMIN — SODIUM CHLORIDE 50 MILLILITER(S): 9 INJECTION INTRAMUSCULAR; INTRAVENOUS; SUBCUTANEOUS at 21:11

## 2020-01-01 RX ADMIN — CALCITRIOL 0.25 MICROGRAM(S): 0.5 CAPSULE ORAL at 11:32

## 2020-01-01 RX ADMIN — Medication 1 DROP(S): at 11:54

## 2020-01-01 RX ADMIN — HEPARIN SODIUM 5000 UNIT(S): 5000 INJECTION INTRAVENOUS; SUBCUTANEOUS at 14:03

## 2020-01-01 RX ADMIN — Medication 1 DROP(S): at 21:13

## 2020-01-01 RX ADMIN — Medication 81 MILLIGRAM(S): at 12:17

## 2020-01-01 RX ADMIN — Medication 81 MILLIGRAM(S): at 12:43

## 2020-01-01 RX ADMIN — ATORVASTATIN CALCIUM 40 MILLIGRAM(S): 80 TABLET, FILM COATED ORAL at 22:26

## 2020-01-01 RX ADMIN — Medication 1 DROP(S): at 06:28

## 2020-01-01 RX ADMIN — SODIUM CHLORIDE 70 MILLILITER(S): 9 INJECTION INTRAMUSCULAR; INTRAVENOUS; SUBCUTANEOUS at 21:48

## 2020-01-01 RX ADMIN — Medication 1 DROP(S): at 18:27

## 2020-01-01 RX ADMIN — BUDESONIDE AND FORMOTEROL FUMARATE DIHYDRATE 2 PUFF(S): 160; 4.5 AEROSOL RESPIRATORY (INHALATION) at 22:26

## 2020-01-01 RX ADMIN — CALCITRIOL 0.25 MICROGRAM(S): 0.5 CAPSULE ORAL at 14:03

## 2020-01-01 RX ADMIN — BUDESONIDE AND FORMOTEROL FUMARATE DIHYDRATE 2 PUFF(S): 160; 4.5 AEROSOL RESPIRATORY (INHALATION) at 09:55

## 2020-01-01 RX ADMIN — CALCITRIOL 0.25 MICROGRAM(S): 0.5 CAPSULE ORAL at 12:17

## 2020-01-01 RX ADMIN — HEPARIN SODIUM 5000 UNIT(S): 5000 INJECTION INTRAVENOUS; SUBCUTANEOUS at 06:28

## 2020-01-01 RX ADMIN — Medication 1 DROP(S): at 05:11

## 2020-01-01 RX ADMIN — CALCITRIOL 0.25 MICROGRAM(S): 0.5 CAPSULE ORAL at 12:16

## 2020-01-01 RX ADMIN — Medication 650 MILLIGRAM(S): at 17:29

## 2020-01-01 RX ADMIN — Medication 1 DROP(S): at 17:44

## 2020-01-01 RX ADMIN — SODIUM CHLORIDE 500 MILLILITER(S): 9 INJECTION INTRAMUSCULAR; INTRAVENOUS; SUBCUTANEOUS at 11:52

## 2020-01-01 RX ADMIN — Medication 1 DROP(S): at 12:57

## 2020-01-01 RX ADMIN — BUDESONIDE AND FORMOTEROL FUMARATE DIHYDRATE 2 PUFF(S): 160; 4.5 AEROSOL RESPIRATORY (INHALATION) at 12:43

## 2020-01-01 RX ADMIN — Medication 650 MILLIGRAM(S): at 18:15

## 2020-01-01 RX ADMIN — SODIUM CHLORIDE 70 MILLILITER(S): 9 INJECTION INTRAMUSCULAR; INTRAVENOUS; SUBCUTANEOUS at 21:50

## 2020-01-01 RX ADMIN — ATORVASTATIN CALCIUM 40 MILLIGRAM(S): 80 TABLET, FILM COATED ORAL at 22:03

## 2020-01-01 RX ADMIN — HEPARIN SODIUM 5000 UNIT(S): 5000 INJECTION INTRAVENOUS; SUBCUTANEOUS at 22:03

## 2020-01-01 RX ADMIN — BUDESONIDE AND FORMOTEROL FUMARATE DIHYDRATE 2 PUFF(S): 160; 4.5 AEROSOL RESPIRATORY (INHALATION) at 08:13

## 2020-01-01 RX ADMIN — SODIUM CHLORIDE 60 MILLILITER(S): 9 INJECTION INTRAMUSCULAR; INTRAVENOUS; SUBCUTANEOUS at 12:39

## 2020-01-01 RX ADMIN — Medication 3 MILLIGRAM(S): at 23:39

## 2020-01-01 RX ADMIN — Medication 1 DROP(S): at 12:42

## 2020-01-01 RX ADMIN — Medication 1 DROP(S): at 05:49

## 2020-01-01 RX ADMIN — Medication 1 DROP(S): at 14:03

## 2020-01-01 RX ADMIN — Medication 1 DROP(S): at 05:15

## 2020-01-01 RX ADMIN — SODIUM CHLORIDE 70 MILLILITER(S): 9 INJECTION INTRAMUSCULAR; INTRAVENOUS; SUBCUTANEOUS at 08:15

## 2020-01-01 RX ADMIN — HEPARIN SODIUM 5000 UNIT(S): 5000 INJECTION INTRAVENOUS; SUBCUTANEOUS at 05:50

## 2020-01-01 RX ADMIN — Medication 81 MILLIGRAM(S): at 11:31

## 2020-01-01 RX ADMIN — HEPARIN SODIUM 5000 UNIT(S): 5000 INJECTION INTRAVENOUS; SUBCUTANEOUS at 21:12

## 2020-01-01 RX ADMIN — Medication 1 DROP(S): at 17:31

## 2020-01-01 RX ADMIN — HEPARIN SODIUM 5000 UNIT(S): 5000 INJECTION INTRAVENOUS; SUBCUTANEOUS at 05:37

## 2020-01-01 RX ADMIN — ATORVASTATIN CALCIUM 40 MILLIGRAM(S): 80 TABLET, FILM COATED ORAL at 21:17

## 2020-01-01 RX ADMIN — Medication 1 DROP(S): at 18:36

## 2020-01-01 RX ADMIN — HEPARIN SODIUM 5000 UNIT(S): 5000 INJECTION INTRAVENOUS; SUBCUTANEOUS at 21:48

## 2020-01-01 RX ADMIN — ATORVASTATIN CALCIUM 40 MILLIGRAM(S): 80 TABLET, FILM COATED ORAL at 21:12

## 2020-01-01 RX ADMIN — Medication 1 DROP(S): at 05:48

## 2020-01-01 RX ADMIN — Medication 1 DROP(S): at 18:52

## 2020-01-01 RX ADMIN — CALCITRIOL 0.25 MICROGRAM(S): 0.5 CAPSULE ORAL at 12:43

## 2020-01-01 RX ADMIN — BUDESONIDE AND FORMOTEROL FUMARATE DIHYDRATE 2 PUFF(S): 160; 4.5 AEROSOL RESPIRATORY (INHALATION) at 21:16

## 2020-01-01 RX ADMIN — SENNA PLUS 2 TABLET(S): 8.6 TABLET ORAL at 21:47

## 2020-01-01 RX ADMIN — Medication 2.5 MILLIGRAM(S): at 01:02

## 2020-01-01 RX ADMIN — MIDODRINE HYDROCHLORIDE 2.5 MILLIGRAM(S): 2.5 TABLET ORAL at 12:13

## 2020-01-01 RX ADMIN — Medication 1 DROP(S): at 00:35

## 2020-01-01 RX ADMIN — ATORVASTATIN CALCIUM 40 MILLIGRAM(S): 80 TABLET, FILM COATED ORAL at 21:41

## 2020-01-01 RX ADMIN — Medication 5 MILLIGRAM(S): at 23:55

## 2020-01-01 RX ADMIN — Medication 1 DROP(S): at 01:04

## 2020-11-11 NOTE — ED ADULT TRIAGE NOTE - CHIEF COMPLAINT QUOTE
As per daughter, pt. has been having intermittent dizziness and "heavy tongue" since April. Pt. has been having low BP with frequent falls and syncopal episodes x 2 wks. Advised to come to ER by PCP. Pt. fell today, no head trauma or LOC noted. h/o CKD, HTN, COPD

## 2020-11-11 NOTE — H&P ADULT - HISTORY OF PRESENT ILLNESS
80M h/o CAD s/p CABG, HTN, HLD, CKD, glaucoma, presents w/ dizziness. Patient speaks Welsh, history was obtained w/ assistance from daughter and [place] . Patient states that he has been feeling weak and dizzy for 10-15 days. Dizziness is intermittent, not positional, last minutes, described as "head spinning" and feeling like he is going to fall, improves w/ salt water and laying still. Patient denies LOC. He also reports decreased PO intake and a dry mouth. No n/v/d, no CP or SOB. In ER, patient hypotensive, BP improved s/p IVF. 80M h/o CAD s/p CABG, HTN, HLD, CKD III, COPD, glaucoma, presents w/ dizziness. Patient speaks Turkish, history was obtained w/ assistance from daughter and [place] . Patient states that he has been feeling weak and dizzy for 10-15 days. Dizziness is intermittent, not positional, last minutes, described as "head spinning" and feeling like he is going to fall, improves w/ salt water and laying still. Patient denies LOC. He also reports decreased PO intake and a dry mouth. No n/v/d, no CP or SOB. In ER, patient hypotensive, BP improved s/p IVF.

## 2020-11-11 NOTE — ED PROVIDER NOTE - OBJECTIVE STATEMENT
80 year old male with PMH of Glaucoma, HTN and CAD (s/p CABG) presents to the ED complaining of feeling lightheaded. As per daughter, for the past 2 weeks. Pt with decreased appetite, generally weak, feeling of passing out often. Pt denies chest pain, dyspnea, palpitations, leg pain or swelling, cough, fevers and chills. Pt denies any other complaints.

## 2020-11-11 NOTE — H&P ADULT - NSICDXPASTMEDICALHX_GEN_ALL_CORE_FT
PAST MEDICAL HISTORY:  Blindness of right eye     CAD (coronary artery disease)     Chronic kidney disease (CKD)     Glaucoma     HTN (hypertension)      PAST MEDICAL HISTORY:  Blindness of right eye     CAD (coronary artery disease)     Chronic kidney disease (CKD)     COPD without exacerbation     Glaucoma     HTN (hypertension)

## 2020-11-11 NOTE — H&P ADULT - ASSESSMENT
80M h/o CAD s/p CABG, HTN, HLD, CKD, glaucoma, presents w/ dizziness, found to be hypotensive and given 500cc bolus 80M h/o CAD s/p CABG, HTN, HLD, CKD III, COPD, glaucoma, presents w/ dizziness, found to be hypotensive and given 500cc bolus

## 2020-11-11 NOTE — H&P ADULT - NSHPLABSRESULTS_GEN_ALL_CORE
10.8   8.95  )-----------( 250      ( 11 Nov 2020 11:35 )             33.5     11-11    134<L>  |  103  |  30<H>  ----------------------------<  109<H>  3.8   |  19<L>  |  2.98<H>    Ca    9.2      11 Nov 2020 11:35    TPro  6.6  /  Alb  3.9  /  TBili  0.6  /  DBili  x   /  AST  23  /  ALT  10  /  AlkPhos  62  11-11      CXR reviewed:  Slight right hemidiaphragm elevation.  Low lung volumes.  Hazy indistinct left CP regions could be due to overlying soft tissues however small pleural effusions difficult to entirely exclude. Sharp right CP angle. Clear remaining visualized lungs. No pneumothorax.  Sternal wires and mediastinal surgical clips again noted. Prominent appearing cardiac and mediastinal silhouettes however inaccurately assessed on the projection. Aortic calcifications again noted.  Lower right paratracheal nodular calcification again noted otherwise trachea midline.  Generalized osteopenia and mild spinal degenerative change again noted.    CT head:  No acute intracranial hemorrhage, mass effect, or shift of the midline structures.  Moderate severity chronic white matter microvascular type changes.    EKG tracing reviewed and interpreted: SR w/ PAC's

## 2020-11-11 NOTE — ED PROVIDER NOTE - ATTENDING CONTRIBUTION TO CARE
Attending Statement: I have reviewed and agree with all pertinent clinical information, including history and physical exam and agree with treatment plan of the PA, except as noted.  79yo  M hx of HTN, COPD not on oxygen, CKD, from daughter from home sp fall this am. Witnessed by daughter states "he slipped" did not hit his head or passed out. She called his cardiologist and advised to come to ED. Endorse he has been getting weaker over last two weeks. Dec mobility, no focal weakness, generalized weakness. no chest pain no cough no fever no abdominal pain no n/v/d no dysuria no melena. Last echo/stress was a year go.  DW w pt daughter at bedside  Vital signs noted. mmm. poor dentition. non icteric not pale. nontender along cervical/thoracic or lumbar spine. no ecchymosis noted on scalp/chest/back or abdomen. HR 80 normal S1-S2 pulse ox 98-100RA good wave form No resp distress. able to speak in full and clear sentences. no wheeze, rales or stridor. BP 98/58 soft nontender abdomen. no  rebound. no guarding. no sign of trauma. no CVAT no pedal edema. no calf tenderness. normal pulses bilateral feet. Ao3 AOx3, no focal deficits. CN 2-12 grossly intact.  no meningeal signs.   plan labs, ekg, urine, covid, cxr, ct head, tba

## 2020-11-11 NOTE — H&P ADULT - PROBLEM SELECTOR PLAN 1
Likely d/t hypotension/hypovolemia   BP improved s/p IVF, monitor  Orthostatics negative post IVF    Check TTE  Telemetry monitoring Likely d/t hypotension/hypovolemia   BP improved s/p IVF, monitor  Orthostatics negative post IVF    Check TTE  Telemetry monitoring  CT head w/ no acute findings

## 2020-11-11 NOTE — CONSULT NOTE ADULT - SUBJECTIVE AND OBJECTIVE BOX
Requesting Physician : ER    Reason for Consultation: near syncope     HISTORY OF PRESENT ILLNESS:  80 year old male with history of CAD s/p prior CABG, HTN, HLD, CKD, COPD, glaucoma who was admitted with dizziness.  The patient has been experiencing generalized weakness and dizziness for 10-15 days.  Symptoms are intermittent and feels like his head is spinning.  No syncope. No chest pain or anginal symptoms.  The patient was admitted for further workup.  CTH demonstrated white matter microvascular changes but no CVA.  ECG demonstrated normal sr with narrow qrs.   In ER, patient hypotensive, BP improved s/p IVF.  (11 Nov 2020 16:49)      PAST MEDICAL & SURGICAL HISTORY:  COPD without exacerbation    Chronic kidney disease (CKD)    HTN (hypertension)    CAD (coronary artery disease)    Blindness of right eye    Glaucoma    H/O coronary artery bypass surgery            MEDICATIONS:  MEDICATIONS  (STANDING):  artificial tears (preservative free) Ophthalmic Solution 1 Drop(s) Both EYES four times a day  aspirin enteric coated 81 milliGRAM(s) Oral daily  atorvastatin 40 milliGRAM(s) Oral at bedtime  budesonide 160 MICROgram(s)/formoterol 4.5 MICROgram(s) Inhaler 2 Puff(s) Inhalation two times a day  calcitriol   Capsule 0.25 MICROGram(s) Oral daily  heparin   Injectable 5000 Unit(s) SubCutaneous every 8 hours  sodium chloride 0.9%. 1000 milliLiter(s) (70 mL/Hr) IV Continuous <Continuous>      Allergies    No Known Allergies    Intolerances        FAMILY HISTORY:  No pertinent family history in first degree relatives      Non-contributary for premature coronary disease or sudden cardiac death    SOCIAL HISTORY:    [x ] Non-smoker  [ ] Smoker  [ ] Alcohol      REVIEW OF SYSTEMS:  [ ]chest pain  [  ]shortness of breath  [  ]palpitations  [  ]syncope  [x ]near syncope [ ]upper extremity weakness   [ ] lower extremity weakness  [  ]diplopia  [  ]altered mental status   [  ]fevers  [ ]chills [ ]nausea  [ ]vomitting  [  ]dysphagia    [ ]abdominal pain  [ ]melena  [ ]BRBPR    [  ]epistaxis  [  ]rash    [ ]lower extremity edema        [x ] All others negative	  [ ] Unable to obtain    PHYSICAL EXAM:  T(C): 36.7 (11-11-20 @ 15:24), Max: 37.1 (11-11-20 @ 12:46)  HR: 80 (11-11-20 @ 15:24) (80 - 89)  BP: 121/73 (11-11-20 @ 15:24) (78/46 - 175/72)  RR: 19 (11-11-20 @ 15:24) (16 - 20)  SpO2: 100% (11-11-20 @ 15:24) (98% - 100%)  Wt(kg): --  I&O's Summary        HEENT:   Normal oral mucosa, PERRL, EOMI	  Lymphatic: No lymphadenopathy , no edema  Cardiovascular: Normal S1 S2, No JVD, No murmurs , Peripheral pulses palpable 2+ bilaterally  Respiratory: Lungs clear to auscultation, normal effort 	  Gastrointestinal:  Soft, Non-tender, + BS	  Skin: No rashes, No ecchymoses, No cyanosis, warm to touch  Musculoskeletal: Normal range of motion, normal strength  Psychiatry:  Mood & affect appropriate      TELEMETRY: 	    ECG:  SR, narrow qrs 	  RADIOLOGY:  OTHER:     DIAGNOSTIC TESTING:  [ ] Echocardiogram: < from: Transthoracic Echocardiogram (12.22.14 @ 07:46) >  CONCLUSIONS:  1. Normal mitral valve. Minimal mitral regurgitation.  2. Normal trileaflet aortic valve. No aortic valve  regurgitation seen.  3. Normal left ventricular internaldimensions and wall  thickness.  4. Normal left ventricular systolic function. EF 65%. No  segmental wall motion abnormalities.  5. Reversal of the E-A  waves of the mitral inflow pattern  is consistent with diastolic LV dysfunction.  6. Normal rightatrium.  7. Normal right ventricular size and function.    < end of copied text >    [ ]  Catheterization:   [ ] Stress Test:    	  	  LABS:	 	    CARDIAC MARKERS:                              10.8   8.95  )-----------( 250      ( 11 Nov 2020 11:35 )             33.5     11-11    134<L>  |  103  |  30<H>  ----------------------------<  109<H>  3.8   |  19<L>  |  2.98<H>    Ca    9.2      11 Nov 2020 11:35    TPro  6.6  /  Alb  3.9  /  TBili  0.6  /  DBili  x   /  AST  23  /  ALT  10  /  AlkPhos  62  11-11    proBNP: Serum Pro-Brain Natriuretic Peptide: 1034 pg/mL (11-11 @ 11:35)    Lipid Profile:   HgA1c:   TSH:     ASSESSMENT/PLAN:  80 year old male with history of CAD s/p prior CABG, HTN, HLD, CKD, COPD, glaucoma who was admitted with dizziness.    -CTH negative for CVA  -Monitor on tele   -check TTE  -check orthostatics  -pt. with elevated troponin but no anginal symptoms - ? secondary to renal disease - check CPK  -further workup pending above    Ankit Guerrero MD

## 2020-11-11 NOTE — H&P ADULT - PROBLEM SELECTOR PROBLEM 5
Glaucoma, unspecified glaucoma type, unspecified laterality Chronic obstructive pulmonary disease, unspecified COPD type

## 2020-11-11 NOTE — H&P ADULT - PROBLEM SELECTOR PLAN 3
Creatinine LAUREN on CKD III likely pre-renal   Continue IV hydration   Avoid nephrotoxic agents   Monitor Cr

## 2020-11-11 NOTE — H&P ADULT - PROBLEM SELECTOR PROBLEM 3
Stage 3 chronic kidney disease, unspecified whether stage 3a or 3b CKD Acute renal failure, unspecified acute renal failure type

## 2020-11-11 NOTE — H&P ADULT - PROBLEM SELECTOR PLAN 4
Elevated troponins likely d/t decreased renal clearance   Troponins 114-->95  EKG w/ SR, no ischemic changes   Continue ASA/statin

## 2020-11-11 NOTE — ED ADULT NURSE REASSESSMENT NOTE - NS ED NURSE REASSESS COMMENT FT1
Received report from morning shift ARLEY Campbell. Received Pt in room B. pt A&OX3, Chinese speaking, daughter at bedside translating. Pt appears stable comfortable and in no apparent distress at this time. vitals as noted. Orthostatics as noted. Pt remains on cardiac monitor. NSR. Respirations are equal and nonlabored, no respiratory distress noted. sating 100%. 20 gauge iv noted to the right arm, flushing well, iv fluids infusing. pt denies any complaints at this time, denies pain, sob, dizziness, headache, fever. safety maintained. will reassess and monitor

## 2020-11-11 NOTE — ED ADULT NURSE NOTE - OBJECTIVE STATEMENT
Arrives from home with daughter at bedside, reports frequent falls unknown whether patient loses consciousness. Daughter denies pt hit his head. States has had an ongoing issue since april with falls and hypotension in the afternoon, per PCP pt was taken off of antihypertensives. Daughter reports PMH stage 4 kidney disease, HTN. PIV placed, labs sent, VS as documented, will continue to monitor.

## 2020-11-11 NOTE — H&P ADULT - PROBLEM SELECTOR PLAN 2
Hypotensive to 78/46 on presentation, responded to 500cc bolus   Atenolol and Losartan were discontinued a few months ago   Monitor BP

## 2020-11-12 NOTE — PROGRESS NOTE ADULT - SUBJECTIVE AND OBJECTIVE BOX
Patient denies chest pain or shortness of breath.   Review of systems otherwise (-)  	  MEDICATIONS  (STANDING):  artificial tears (preservative free) Ophthalmic Solution 1 Drop(s) Both EYES four times a day  aspirin enteric coated 81 milliGRAM(s) Oral daily  atorvastatin 40 milliGRAM(s) Oral at bedtime  budesonide 160 MICROgram(s)/formoterol 4.5 MICROgram(s) Inhaler 2 Puff(s) Inhalation two times a day  calcitriol   Capsule 0.25 MICROGram(s) Oral daily  heparin   Injectable 5000 Unit(s) SubCutaneous every 8 hours  sodium chloride 0.9%. 1000 milliLiter(s) (70 mL/Hr) IV Continuous <Continuous>      LABS:	 	                       8.1    5.66  )-----------( 166      ( 12 Nov 2020 06:45 )             25.8       137  |  107  |  25<H>  ----------------------------<  92  3.5   |  17<L>  |  2.67<H>    Ca    8.4      12 Nov 2020 06:45    TPro  5.5<L>  /  Alb  3.0<L>  /  TBili  0.5  /  DBili  x   /  AST  19  /  ALT  10  /  AlkPhos  51  11-12    Creatinine Trend: 2.67<--, 2.98<--    PHYSICAL EXAM:  T(C): 36.7 (11-12-20 @ 12:18), Max: 37.2 (11-11-20 @ 22:08)  HR: 66 (11-12-20 @ 12:18) (63 - 78)  BP: 121/62 (11-12-20 @ 12:18) (102/64 - 165/80)  RR: 18 (11-12-20 @ 12:18) (17 - 18)  SpO2: 100% (11-12-20 @ 12:18) (99% - 100%)    Gen: Appears well in NAD  HEENT:  (-)icterus (-)pallor  CV: N S1 S2 1/6 SALVADOR (+)2 Pulses B/l  Resp:  Clear to ausculatation B/L, normal effort  GI: (+) BS Soft, NT, ND  Lymph:  (-)Edema, (-)obvious lymphadenopathy  Skin: Warm to touch, Normal turgor  Psych: Appropriate mood and affect    TELEMETRY: 	SR        < from: CT Head No Cont (11.11.20 @ 12:23) >  IMPRESSION: No acute intracranial hemorrhage, mass effect, or shift of the midline structures.    Moderate severity chronic white matter microvascular type changes.    < end of copied text >        ASSESSMENT/PLAN: 	    80 year old male with history of CAD s/p prior CABG, HTN, HLD, CKD, COPD, glaucoma who was admitted with dizziness.    --CTH negative for CVA  --await TTE  --repeat Labs today  --elevated Trop T with negaticve CK/MB, pt without anginal symptoms, suspect secondary to CKD  --ischemic eval pending above  --appreciate renal eval

## 2020-11-12 NOTE — CONSULT NOTE ADULT - ASSESSMENT
81 y/o Sinhala speaking M with past medical history of CAD s/p CABG, HTN, HLD, CKD III, COPD, glaucoma presents to [facility] on 11/11 for 10-15 days of generalized weakness and dizziness (described as intermittent, room spinning, non-positional).    Impression:     Recommendations: 79 y/o Upper sorbian speaking M with past medical history of CAD s/p CABG, HTN, HLD, CKD III, COPD, glaucoma, presented to [facility] on 11/11 w/ dizziness and generalized weakness a few days. Patient reports he had intermittent heaviness of his head with dry mouth for the past few days. Per EMR reportedly for 10-15 days though did not endorse exact timeframe on my interview. Dizziness described as is intermittent a/w lightheadedness and feeling like he is going to fall. Reportedly tried to drink salt water which did not help prompting him to come to [facility] ED. Patient denied LOC. Denied n/v/d, no CP or SOB. Denied any double vision, loss of vision, unilateral weakness, problems with coordination.   Admitted to Cardiology service for dizziness in the setting of elevated troponins (115 --> 94). CTH non-contrast demonstrating no acute pathology but moderate chronic microvascular ischemic changes. Orthostatics negative.  Neurology consulted for alternate etiologies of dizziness.   At the time of my interview, patient repeatedly stating he feels fine now and is back to his baseline. No neurologic complaints.     Initial ED vitals notable for BP of 70/40 which improved with IVF hydration. Labs notable for anemia (?unknown cause) with downtrending hgb (?hemodilution).   Neurologic exam limited but non-focal.     Impression: Lightheadedness/Dizziness -- now resolved -- likely 2/2 orthostatic hypotension in the setting of profound hypovolemia 2/2 unknown etiology; low suspicion for cerebrovascular event    Recommendations:   [] Agree with TTE per Cardiology  [] Send Iron studies, B12, Folate, MMA, Homocysteine to evaluate for Anemia  [] Would defer any further neurovascular investigations at this time unless patient were to develop focal neurologic symptoms at which point can consider MRI brain w/o and MRA head w/o + Carotid dopplers    Plan pending discussion with Neurology Attending.    79 y/o Portuguese speaking M with past medical history of CAD s/p CABG, HTN, HLD, CKD III, COPD, glaucoma, presented to [facility] on 11/11 w/ dizziness and generalized weakness a few days. Patient reports he had intermittent heaviness of his head with dry mouth for the past few days. Per EMR reportedly for 10-15 days though did not endorse exact timeframe on my interview. Dizziness described as is intermittent a/w lightheadedness and feeling like he is going to fall. Reportedly tried to drink salt water which did not help prompting him to come to [facility] ED. Patient denied LOC. Denied n/v/d, no CP or SOB. Denied any double vision, loss of vision, unilateral weakness, problems with coordination.   Admitted to Cardiology service for dizziness in the setting of elevated troponins (115 --> 94). CTH non-contrast demonstrating no acute pathology but moderate chronic microvascular ischemic changes. Orthostatics negative.  Neurology consulted for alternate etiologies of dizziness.   At the time of my interview, patient repeatedly stating he feels fine now and is back to his baseline. No neurologic complaints.     Initial ED vitals notable for BP of 70/40 which improved with IVF hydration. Labs notable for anemia (?renal insufficiency vs alternate cause) with downtrending hgb (?hemodilution).   Neurologic exam limited but non-focal.     Impression: Lightheadedness/Dizziness -- now resolved -- likely 2/2 orthostatic hypotension in the setting of profound hypovolemia 2/2 unknown etiology; low suspicion for cerebrovascular event    Recommendations:   [] Agree with TTE per Cardiology  [] Send Iron studies, B12, Folate, MMA, Homocysteine to evaluate for Anemia  [] Would defer any further neurovascular investigations at this time unless patient were to develop focal neurologic symptoms at which point can consider MRI brain w/o and MRA head w/o + Carotid dopplers    Plan pending discussion with Neurology Attending.

## 2020-11-12 NOTE — PROGRESS NOTE ADULT - ATTENDING COMMENTS
Patient seen and examined.  Agree with above.   Elevated troponin with negative CPK inconsistent with acs  Neuro eval for dizziness  Repeat CBC given drop in H/H - no signs of bleeding currently  Renal evaluation with Dr. Pak  Awaiting TTE  Ischemic eval pending above    Ankit Guerrero MD

## 2020-11-12 NOTE — CONSULT NOTE ADULT - ATTENDING COMMENTS
81 y/o Thai speaking M with past medical history of CAD s/p CABG, HTN, HLD, CKD III, COPD, glaucoma with right eye blindness, presented to [facility] on 11/11 w/ dizziness and generalized weakness a few days. On admission BP was 70's systolic which improved with fluid resuscitation. Pt currently confused, thinks the  is his daughter and repeatedly asks her when she is coming here. Pt is a poor historian and unable ti clearly state why he is in the hospital. When asked if he takes in adequate food and fluids he says yes, however his exam shows signs of severe dehydration  very dry mucus membranes, with skin tenting all over.  Likely metabolic encephalopathy superimposed on underlying dementia, although cannot completely assess in this setting, but would contribute to his poor intake.   Continue fluid hydration via IV  frequent reorientation.   Use of Thai , continue use of any visual or hearing aids

## 2020-11-12 NOTE — CONSULT NOTE ADULT - SUBJECTIVE AND OBJECTIVE BOX
Neurology Consult Note -- Incomplete    History obtained from EMR -- patient off floor for diagnostic test    HPI: 81 y/o Kiswahili speaking M with past medical history of CAD s/p CABG, HTN, HLD, CKD III, COPD, glaucoma, presented to [facility] on  w/ dizziness and generalized weakness for 10-15 days. Dizziness is intermittent, not positional, last minutes, described as "head spinning" and feeling like he is going to fall, improves w/ salt water and laying still. Patient denied LOC. Denied n/v/d, no CP or SOB. Admitted to Cardiology service for dizziness in the setting of elevated troponins (115 --> 94). CTH non-contrast demonstrating no acute pathology but moderate chronic microvascular ischemic changes. Orthostatics negative.  Neurology consulted for alternate etiologies of dizziness.         (Stroke only)  NIHSS:   MRS:   ICH:     REVIEW OF SYSTEMS  A 10-system ROS was performed and is negative except for those items noted above and/or in the HPI.    PAST MEDICAL & SURGICAL HISTORY:  COPD without exacerbation    Chronic kidney disease (CKD)    HTN (hypertension)    CAD (coronary artery disease)    Blindness of right eye    Glaucoma    H/O coronary artery bypass surgery      FAMILY HISTORY:  No pertinent family history in first degree relatives      SOCIAL HISTORY:   T/E/D:   Occupation:   Lives with:     MEDICATIONS (HOME):  Home Medications:  aspirin 81 mg oral delayed release tablet: 1 tab(s) orally once a day (2020 17:23)  calcitriol 0.25 mcg oral capsule: 1 cap(s) orally once a day (2020 17:23)  Dulera 200 mcg-5 mcg/inh inhalation aerosol: 2 puff(s) inhaled 2 times a day (2020 17:23)  Lipitor 40 mg oral tablet: 1 tab(s) orally once a day (2020 17:23)  Refresh ophthalmic solution: 1 drop(s) to each affected eye 4 times a day (2020 17:23)    MEDICATIONS  (STANDING):  artificial tears (preservative free) Ophthalmic Solution 1 Drop(s) Both EYES four times a day  aspirin enteric coated 81 milliGRAM(s) Oral daily  atorvastatin 40 milliGRAM(s) Oral at bedtime  budesonide 160 MICROgram(s)/formoterol 4.5 MICROgram(s) Inhaler 2 Puff(s) Inhalation two times a day  calcitriol   Capsule 0.25 MICROGram(s) Oral daily  heparin   Injectable 5000 Unit(s) SubCutaneous every 8 hours  sodium chloride 0.9%. 1000 milliLiter(s) (70 mL/Hr) IV Continuous <Continuous>    MEDICATIONS  (PRN):  senna 2 Tablet(s) Oral at bedtime PRN Constipation    ALLERGIES/INTOLERANCES:  Allergies  No Known Allergies    Intolerances    VITALS & EXAMINATION:  Vital Signs Last 24 Hrs  T(C): 36.7 (2020 12:18), Max: 37.2 (2020 22:08)  T(F): 98.1 (2020 12:18), Max: 98.9 (2020 22:08)  HR: 66 (2020 12:18) (63 - 78)  BP: 121/62 (:18) (102/64 - 165/80)  BP(mean): --  RR: 18 (:18) (17 - 18)  SpO2: 100% (2020 12:18) (99% - 100%)    NEUROLOGIC EXAM: -- Unable to perform as patient off floor for diagnostic testing    LABORATORY:  CBC                       8.1    5.66  )-----------( 166      ( 2020 06:45 )             25.8     Chem 11-12    137  |  107  |  25<H>  ----------------------------<  92  3.5   |  17<L>  |  2.67<H>    Ca    8.4      2020 06:45    TPro  5.5<L>  /  Alb  3.0<L>  /  TBili  0.5  /  DBili  x   /  AST  19  /  ALT  10  /  AlkPhos  51  11-12    LFTs LIVER FUNCTIONS - ( 2020 06:45 )  Alb: 3.0 g/dL / Pro: 5.5 g/dL / ALK PHOS: 51 u/L / ALT: 10 u/L / AST: 19 u/L / GGT: x           Coagulopathy   Lipid Panel   A1c   Cardiac enzymes CARDIAC MARKERS ( 2020 11:35 )  x     / x     / 89 u/L / 3.31 ng/mL / x          U/A Urinalysis Basic - ( 2020 02:52 )    Color: LIGHT YELLOW / Appearance: CLEAR / S.010 / pH: 6.5  Gluc: NEGATIVE / Ketone: NEGATIVE  / Bili: NEGATIVE / Urobili: NORMAL   Blood: NEGATIVE / Protein: 50 / Nitrite: NEGATIVE   Leuk Esterase: NEGATIVE / RBC: 0-2 / WBC 0-2   Sq Epi: OCC / Non Sq Epi: x / Bacteria: NEGATIVE      CSF  Immunological  Other    STUDIES & IMAGING:  Studies (EKG, EEG, EMG, etc):     Radiology (XR, CT, MR, U/S, TTE/SANDRA):  < from: CT Head No Cont (20 @ 12:23) >  IMPRESSION: No acute intracranial hemorrhage, mass effect, or shift of the midline structures.    Moderate severity chronic white matter microvascular type changes.    < end of copied text >   Neurology Consult Note    History obtained from EMR and patient. Bel[place]ian  utilized ID# [id number]    HPI: 79 y/o Belarusian speaking M with past medical history of CAD s/p CABG, HTN, HLD, CKD III, COPD, glaucoma, presented to [facility] on  w/ dizziness and generalized weakness a few days. Patient reports he had intermittent heaviness of his head with dry mouth for the past few days. Per EMR reportedly for 10-15 days though did not endorse exact timeframe on my interview. Dizziness described as is intermittent a/w lightheadedness and feeling like he is going to fall. Reportedly tried to drink salt water which did not help prompting him to come to [facility] ED. Patient denied LOC. Denied n/v/d, no CP or SOB. Denied any double vision, loss of vision, unilateral weakness, problems with coordination.   Admitted to Cardiology service for dizziness in the setting of elevated troponins (115 --> 94). CTH non-contrast demonstrating no acute pathology but moderate chronic microvascular ischemic changes. Orthostatics negative.  Neurology consulted for alternate etiologies of dizziness.   At the time of my interview, patient repeatedly stating he feels fine now and is back to his baseline. No neurologic complaints.     REVIEW OF SYSTEMS  A 10-system ROS was performed and is negative except for those items noted above and/or in the HPI.    PAST MEDICAL & SURGICAL HISTORY:  COPD without exacerbation    Chronic kidney disease (CKD)    HTN (hypertension)    CAD (coronary artery disease)    Blindness of right eye    Glaucoma    H/O coronary artery bypass surgery      FAMILY HISTORY:  No pertinent family history in first degree relatives      SOCIAL HISTORY:   T/E/D:   Occupation:   Lives with:     MEDICATIONS (HOME):  Home Medications:  aspirin 81 mg oral delayed release tablet: 1 tab(s) orally once a day (2020 17:23)  calcitriol 0.25 mcg oral capsule: 1 cap(s) orally once a day (2020 17:23)  Dulera 200 mcg-5 mcg/inh inhalation aerosol: 2 puff(s) inhaled 2 times a day (2020 17:23)  Lipitor 40 mg oral tablet: 1 tab(s) orally once a day (2020 17:23)  Refresh ophthalmic solution: 1 drop(s) to each affected eye 4 times a day (2020 17:23)    MEDICATIONS  (STANDING):  artificial tears (preservative free) Ophthalmic Solution 1 Drop(s) Both EYES four times a day  aspirin enteric coated 81 milliGRAM(s) Oral daily  atorvastatin 40 milliGRAM(s) Oral at bedtime  budesonide 160 MICROgram(s)/formoterol 4.5 MICROgram(s) Inhaler 2 Puff(s) Inhalation two times a day  calcitriol   Capsule 0.25 MICROGram(s) Oral daily  heparin   Injectable 5000 Unit(s) SubCutaneous every 8 hours  sodium chloride 0.9%. 1000 milliLiter(s) (70 mL/Hr) IV Continuous <Continuous>    MEDICATIONS  (PRN):  senna 2 Tablet(s) Oral at bedtime PRN Constipation    ALLERGIES/INTOLERANCES:  Allergies  No Known Allergies    Intolerances    VITALS & EXAMINATION:  Vital Signs Last 24 Hrs  T(C): 36.7 (2020 12:18), Max: 37.2 (2020 22:08)  T(F): 98.1 (2020 12:18), Max: 98.9 (2020 22:08)  HR: 66 (2020 12:18) (63 - 78)  BP: 121/62 (2020 12:18) (102/64 - 165/80)  BP(mean): --  RR: 18 (2020 12:18) (17 - 18)  SpO2: 100% (2020 12:18) (99% - 100%)    NEUROLOGIC EXAM:  Neurological (>12):  MS: Awake, alert, oriented to person, place, situation, time. Normal affect. Follows all commands.    Language: Speech is clear, fluent with good repetition & comprehension (able to name objects)    CNs: VF grossly intact. EOMI no nystagmus. V1-3 intact to LT, well developed masseter muscles b/l. No facial asymmetry b/l. Symmetric palate elevation in midline. Gag reflex deferred. Head turning & shoulder shrug intact b/l. Tongue midline, normal movements, no atrophy.    Fundoscopic: Not visualized      Motor: Normal muscle bulk & tone. No pronator drift.              Deltoid	Biceps	Triceps	Wrist	   R	5	5	5	5	5 	  L	5	5	5	5	5    	H-Flex	K-Flex	K-Ext	D-Flex	P-Flex  R	5	5	5	5	5 	   L	5	5	5	5	5	     Sensation: Intact to LT b/l throughout.     Cortical: Extinction on DSS (neglect): none    Reflexes:              Biceps(C5)       BR(C6)     Triceps(C7)               Patellar(L4)    Achilles(S1)    Plantar Resp  R	2	          2	             2		        1		    1		Down   L	2	          2	             2		        1		    1		Down     Coordination: No dysmetria to FTN/HTS    Gait: Not assessed.       LABORATORY:  CBC                       8.1    5.66  )-----------( 166      ( 2020 06:45 )             25.8     Chem 11-12    137  |  107  |  25<H>  ----------------------------<  92  3.5   |  17<L>  |  2.67<H>    Ca    8.4      2020 06:45    TPro  5.5<L>  /  Alb  3.0<L>  /  TBili  0.5  /  DBili  x   /  AST  19  /  ALT  10  /  AlkPhos  51  11-12    LFTs LIVER FUNCTIONS - ( 2020 06:45 )  Alb: 3.0 g/dL / Pro: 5.5 g/dL / ALK PHOS: 51 u/L / ALT: 10 u/L / AST: 19 u/L / GGT: x           Coagulopathy   Lipid Panel   A1c   Cardiac enzymes CARDIAC MARKERS ( 2020 11:35 )  x     / x     / 89 u/L / 3.31 ng/mL / x          U/A Urinalysis Basic - ( 2020 02:52 )    Color: LIGHT YELLOW / Appearance: CLEAR / S.010 / pH: 6.5  Gluc: NEGATIVE / Ketone: NEGATIVE  / Bili: NEGATIVE / Urobili: NORMAL   Blood: NEGATIVE / Protein: 50 / Nitrite: NEGATIVE   Leuk Esterase: NEGATIVE / RBC: 0-2 / WBC 0-2   Sq Epi: OCC / Non Sq Epi: x / Bacteria: NEGATIVE      CSF  Immunological  Other    STUDIES & IMAGING:  Studies (EKG, EEG, EMG, etc):     Radiology (XR, CT, MR, U/S, TTE/SANDRA):  < from: CT Head No Cont (20 @ 12:23) >  IMPRESSION: No acute intracranial hemorrhage, mass effect, or shift of the midline structures.    Moderate severity chronic white matter microvascular type changes.    < end of copied text >

## 2020-11-13 NOTE — CONSULT NOTE ADULT - ASSESSMENT
80M h/o CAD s/p CABG, HTN, HLD, CKD III, COPD, glaucoma, presents w/ dizziness.  +orthostatic by BP despite elevated BP  Elevated creatinine and likely LAUREN but unclear baseline. 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  Was this simple dehydration--hypotensive?  +orthostatic by BP despite elevated BP  Elevated creatinine and likely LAUREN but unclear baseline. 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  At present hold off on MRI    [facility]   [facility]   [phone]

## 2020-11-13 NOTE — PROVIDER CONTACT NOTE (OTHER) - ASSESSMENT
Patient was asymptomatic at the time. IV fluids currently running. Rechecked BP in 1 hour manually and was 190/86. Patient still asymptomatic. IVP hydralazine given then. BP rechecked approximately one hour post IVP hydralazine - 184/76. Patient is still asymptomatic.

## 2020-11-13 NOTE — PROGRESS NOTE ADULT - SUBJECTIVE AND OBJECTIVE BOX
Patient denies chest pain or shortness of breath.   Review of systems otherwise (-)  	  artificial tears (preservative free) Ophthalmic Solution 1 Drop(s) Both EYES four times a day  aspirin enteric coated 81 milliGRAM(s) Oral daily  atorvastatin 40 milliGRAM(s) Oral at bedtime  budesonide 160 MICROgram(s)/formoterol 4.5 MICROgram(s) Inhaler 2 Puff(s) Inhalation two times a day  calcitriol   Capsule 0.25 MICROGram(s) Oral daily  heparin   Injectable 5000 Unit(s) SubCutaneous every 8 hours  potassium chloride    Tablet ER 40 milliEquivalent(s) Oral once  senna 2 Tablet(s) Oral at bedtime PRN  sodium chloride 0.9%. 1000 milliLiter(s) IV Continuous <Continuous>                            9.6    6.38  )-----------( 188      ( 13 Nov 2020 06:20 )             30.2       11-13    136  |  109<H>  |  24<H>  ----------------------------<  91  3.3<L>   |  16<L>  |  2.28<H>    Ca    8.9      13 Nov 2020 05:20  Phos  3.2     11-13  Mg     1.8     11-13    TPro  5.5<L>  /  Alb  3.0<L>  /  TBili  0.5  /  DBili  x   /  AST  19  /  ALT  10  /  AlkPhos  51  11-12      CARDIAC MARKERS ( 12 Nov 2020 19:19 )  x     / x     / 72 u/L / 3.08 ng/mL / x      CARDIAC MARKERS ( 12 Nov 2020 17:11 )  x     / x     / 68 u/L / 2.88 ng/mL / x      CARDIAC MARKERS ( 11 Nov 2020 11:35 )  x     / x     / 89 u/L / 3.31 ng/mL / x            T(C): 36.7 (11-13-20 @ 11:00), Max: 36.7 (11-13-20 @ 05:09)  HR: 67 (11-13-20 @ 11:00) (67 - 82)  BP: 147/85 (11-13-20 @ 11:00) (147/85 - 192/84)  RR: 18 (11-13-20 @ 11:00) (18 - 18)  SpO2: 100% (11-13-20 @ 11:00) (98% - 100%)  Wt(kg): --    I&O's Summary    12 Nov 2020 07:01  -  13 Nov 2020 07:00  --------------------------------------------------------  IN: 1720 mL / OUT: 1790 mL / NET: -70 mL        Gen: Appears well in NAD  HEENT:  (-)icterus (-)pallor  CV: N S1 S2 1/6 SALVADOR (+)2 Pulses B/l  Resp:  Clear to ausculatation B/L, normal effort  GI: (+) BS Soft, NT, ND  Lymph:  (-)Edema, (-)obvious lymphadenopathy  Skin: Warm to touch, Normal turgor  Psych: Appropriate mood and affect    TELEMETRY: 	SR        < from: CT Head No Cont (11.11.20 @ 12:23) >  IMPRESSION: No acute intracranial hemorrhage, mass effect, or shift of the midline structures.    Moderate severity chronic white matter microvascular type changes.    < end of copied text >        ASSESSMENT/PLAN: 	    80 year old male with history of CAD s/p prior CABG, HTN, HLD, CKD, COPD, glaucoma who was admitted with dizziness.    --CTH negative for CVA  --Appreciate neuro evaluation - will hold off on further neuro imaging unless clinical condition changes per neuro   --TTE with normal LV function   --Check NST   --repeat cbc stable  --appreciate renal evaluation  --monitor orthostatics    Ankit Guerrero MD

## 2020-11-13 NOTE — CONSULT NOTE ADULT - SUBJECTIVE AND OBJECTIVE BOX
NEPHROLOGY - NSN    Patient seen and examined.    HPI:  80M h/o CAD s/p CABG, HTN, HLD, CKD III, COPD, glaucoma, presents w/ dizziness. Patient speaks Green[place]ic, history was obtained w/ assistance from daughter and [place] . Patient states that he has been feeling weak and dizzy for 10-15 days. Dizziness is intermittent, not positional, last minutes, described as "head spinning" and feeling like he is going to fall, improves w/ salt water and laying still. Patient denies LOC. He also reports decreased PO intake and a dry mouth. No n/v/d, no CP or SOB. In ER, patient hypotensive, BP improved s/p IVF.  (2020 16:49)      PAST MEDICAL & SURGICAL HISTORY:  COPD without exacerbation    Chronic kidney disease (CKD)    HTN (hypertension)    CAD (coronary artery disease)    Blindness of right eye    Glaucoma    H/O coronary artery bypass surgery        MEDICATIONS  (STANDING):  artificial tears (preservative free) Ophthalmic Solution 1 Drop(s) Both EYES four times a day  aspirin enteric coated 81 milliGRAM(s) Oral daily  atorvastatin 40 milliGRAM(s) Oral at bedtime  budesonide 160 MICROgram(s)/formoterol 4.5 MICROgram(s) Inhaler 2 Puff(s) Inhalation two times a day  calcitriol   Capsule 0.25 MICROGram(s) Oral daily  heparin   Injectable 5000 Unit(s) SubCutaneous every 8 hours  sodium chloride 0.9%. 1000 milliLiter(s) (70 mL/Hr) IV Continuous <Continuous>      Allergies    No Known Allergies    Intolerances        SOCIAL HISTORY:  Denies alcohol abuse, drug abuse or tobacco usage.     FAMILY HISTORY:  No pertinent family history in first degree relatives        VITALS:  T(C): 36.7 (20 @ 05:09), Max: 36.7 (20 @ 11:55)  HR: 80 (20 @ 05:09) (63 - 80)  BP: 184/88 (20 @ 05:09) (102/64 - 192/84)  RR: 18 (20 @ 05:09) (18 - 18)  SpO2: 99% (20 @ 05:09) (98% - 100%)    REVIEW OF SYSTEMS:   Denies chest pain, SOB, focal weakness, hematuria or dysuria. Good oral intake and denies fatigue or weakness. All other pertinent systems are reviewed and are negative.    PHYSICAL EXAM:  Constitutional: NAD  HEENT: EOMI  Neck:  No JVD, supple   Respiratory: CTA B/L  Cardiovascular: S1 and S2, RRR  Gastrointestinal: + BS, soft, NT, ND  Extremities: No peripheral edema, + peripheral pulses  Neurological: A/O x 3, CN2-12 intact  Psychiatric: Normal mood, normal affect  : No Terrell  Skin: No rashes, C/D/I  Access: Not applicable    I and O's:     @ 07:  -   @ 07:00  --------------------------------------------------------  IN: 0 mL / OUT: 500 mL / NET: -500 mL     @ 07:  -   @ 07:00  --------------------------------------------------------  IN: 1720 mL / OUT: 1790 mL / NET: -70 mL          LABS:                        9.6    6.38  )-----------( 188      ( 2020 06:20 )             30.2     11-13    136  |  109<H>  |  24<H>  ----------------------------<  91  3.3<L>   |  16<L>  |  2.28<H>    Ca    8.9      2020 05:20  Phos  3.2     -13  Mg     1.8         TPro  5.5<L>  /  Alb  3.0<L>  /  TBili  0.5  /  DBili  x   /  AST  19  /  ALT  10  /  AlkPhos  51  12      URINE:  Urinalysis Basic - ( 2020 02:52 )    Color: LIGHT YELLOW / Appearance: CLEAR / S.010 / pH: 6.5  Gluc: NEGATIVE / Ketone: NEGATIVE  / Bili: NEGATIVE / Urobili: NORMAL   Blood: NEGATIVE / Protein: 50 / Nitrite: NEGATIVE   Leuk Esterase: NEGATIVE / RBC: 0-2 / WBC 0-2   Sq Epi: OCC / Non Sq Epi: x / Bacteria: NEGATIVE        RADIOLOGY & ADDITIONAL STUDIES:   < from: Xray Chest 2 Views PA/Lat (20 @ 12:10) >    EXAM:  XR CHEST PA LAT 2V        PROCEDURE DATE:  2020         INTERPRETATION:  CLINICAL INDICATION: fluid congestion    EXAM:  Frontal and lateral chest from 2020 at 1210. Compared to prior study from 2014.    IMPRESSION:  Slight right hemidiaphragm elevation.    Low lung volumes.    Hazy indistinct left CP regions could be due to overlying soft tissues however small pleural effusions difficult to entirely exclude. Sharp right CP angle. Clear remaining visualized lungs. No pneumothorax.    Sternal wires and mediastinal surgical clips again noted. Prominent appearing cardiac and mediastinal silhouettes however inaccurately assessed on the projection. Aortic calcifications again noted.    Lower right paratracheal nodular calcification again noted otherwise trachea midline.    Generalized osteopenia and mild spinal degenerative change again noted.              RICKY COLEMAN MD; Attending Radiologist  This document has been electronically signed. 2020  1:56PM    < end of copied text >  < from: CT Head No Cont (20 @ 12:23) >    EXAM:  CT BRAIN        PROCEDURE DATE:  2020         INTERPRETATION:  .    CLINICAL INFORMATION: unsteady gait, generallized weakness    TECHNIQUE: Multiple axial CT images of the head were obtained without contrast. Sagittal and coronal reconstructed images were acquired from the source data.    COMPARISON: No prior CT studies of the brain are available for comparison at this institution.    FINDINGS: There is no acute intracranial hemorrhage, mass effect, shift of the midline structures, herniation, extra-axial fluid collection, or hydrocephalus.    There is diffuse cerebral volume loss with prominence of the sulci, fissures, and cisternal spaces which is normal for the patient's age. There is moderate patchy confluent deep and periventricular white matter hypoattenuation statistically compatible with microvascular changes given calcific atherosclerotic disease of the intracranial arteries.    The paranasal sinuses and mastoid air cells are clear. The calvarium is intact. Bilateral senile scleral plaques are noted.    IMPRESSION: No acute intracranial hemorrhage, mass effect, or shift of the midline structures.    Moderate severity chronic white matter microvascular type changes.              RAÚL MOSLEY MD; Attending Radiologist  This document has been electronically signed. 2020 12:25PM    < end of copied text >   NEPHROLOGY - NSN    Patient seen and examined.    HPI:  80M h/o CAD s/p CABG, HTN, HLD, CKD III, COPD, glaucoma, presents w/ dizziness. Patient speaks [place], history was obtained w/ assistance from daughter and [facility] . Patient states that he has been feeling weak and dizzy for 10-15 days. Dizziness is intermittent, not positional, last minutes, described as "head spinning" and feeling like he is going to fall, improves w/ salt water and laying still. Patient denies LOC. He also reports decreased PO intake and a dry mouth. No n/v/d, no CP or SOB. In ER, patient hypotensive, BP improved s/p IVF.  (2020 16:49)  He has had HTN for yrs but no CVA.  He is aware of weak kidneys but unable to elucidate serum creatinine.  No hx of DM.  There is no hematuria or bubbles in the urine.  No history of NSAIDS or nephrolithisis.  The patient urinates once or twice in the night and there is no incontinence.  No family hx or renal disease or back pain.    No recent abx use.  No alleviating or aggravating factors with respect to the kidneys.     PAST MEDICAL & SURGICAL HISTORY:  COPD without exacerbation    Chronic kidney disease (CKD)    HTN (hypertension)    CAD (coronary artery disease)    Blindness of right eye    Glaucoma    H/O coronary artery bypass surgery        MEDICATIONS  (STANDING):  artificial tears (preservative free) Ophthalmic Solution 1 Drop(s) Both EYES four times a day  aspirin enteric coated 81 milliGRAM(s) Oral daily  atorvastatin 40 milliGRAM(s) Oral at bedtime  budesonide 160 MICROgram(s)/formoterol 4.5 MICROgram(s) Inhaler 2 Puff(s) Inhalation two times a day  calcitriol   Capsule 0.25 MICROGram(s) Oral daily  heparin   Injectable 5000 Unit(s) SubCutaneous every 8 hours  sodium chloride 0.9%. 1000 milliLiter(s) (70 mL/Hr) IV Continuous <Continuous>      Allergies    No Known Allergies    Intolerances        SOCIAL HISTORY:  Denies alcohol abuse, drug abuse or tobacco usage.     FAMILY HISTORY:  No pertinent family history in first degree relatives        VITALS:  T(C): 36.7 (20 @ 05:09), Max: 36.7 (20 @ 11:55)  HR: 80 (20 @ 05:09) (63 - 80)  BP: 184/88 (20 @ 05:09) (102/64 - 192/84)  RR: 18 (20 @ 05:09) (18 - 18)  SpO2: 99% (20 @ 05:09) (98% - 100%)    REVIEW OF SYSTEMS:   Denies chest pain, SOB, focal weakness, hematuria or dysuria. Good oral intake and denies fatigue or weakness. All other pertinent systems are reviewed and are negative.    PHYSICAL EXAM:  Constitutional: NAD  HEENT: EOMI  Neck:  No JVD, supple   Respiratory: CTA B/L  Cardiovascular: S1 and S2, RRR  Gastrointestinal: + BS, soft, NT, ND  Extremities: No peripheral edema, + peripheral pulses  Neurological: A/O x 3, CN2-12 intact  Psychiatric: Normal mood, normal affect  : No Terrell  Skin: No rashes, C/D/I  Access: Not applicable    I and O's:     @ 07:  -   @ 07:00  --------------------------------------------------------  IN: 0 mL / OUT: 500 mL / NET: -500 mL     @ 07:  -   @ 07:00  --------------------------------------------------------  IN: 1720 mL / OUT: 1790 mL / NET: -70 mL          LABS:                        9.6    6.38  )-----------( 188      ( 2020 06:20 )             30.2     13    136  |  109<H>  |  24<H>  ----------------------------<  91  3.3<L>   |  16<L>  |  2.28<H>    Ca    8.9      2020 05:20  Phos  3.2       Mg     1.8         TPro  5.5<L>  /  Alb  3.0<L>  /  TBili  0.5  /  DBili  x   /  AST  19  /  ALT  10  /  AlkPhos  51  -12      URINE:  Urinalysis Basic - ( 2020 02:52 )    Color: LIGHT YELLOW / Appearance: CLEAR / S.010 / pH: 6.5  Gluc: NEGATIVE / Ketone: NEGATIVE  / Bili: NEGATIVE / Urobili: NORMAL   Blood: NEGATIVE / Protein: 50 / Nitrite: NEGATIVE   Leuk Esterase: NEGATIVE / RBC: 0-2 / WBC 0-2   Sq Epi: OCC / Non Sq Epi: x / Bacteria: NEGATIVE        RADIOLOGY & ADDITIONAL STUDIES:   < from: Xray Chest 2 Views PA/Lat (20 @ 12:10) >    EXAM:  XR CHEST PA LAT 2V        PROCEDURE DATE:  2020         INTERPRETATION:  CLINICAL INDICATION: fluid congestion    EXAM:  Frontal and lateral chest from 2020 at 1210. Compared to prior study from 2014.    IMPRESSION:  Slight right hemidiaphragm elevation.    Low lung volumes.    Hazy indistinct left CP regions could be due to overlying soft tissues however small pleural effusions difficult to entirely exclude. Sharp right CP angle. Clear remaining visualized lungs. No pneumothorax.    Sternal wires and mediastinal surgical clips again noted. Prominent appearing cardiac and mediastinal silhouettes however inaccurately assessed on the projection. Aortic calcifications again noted.    Lower right paratracheal nodular calcification again noted otherwise trachea midline.    Generalized osteopenia and mild spinal degenerative change again noted.              RICKY COLEMAN MD; Attending Radiologist  This document has been electronically signed. 2020  1:56PM    < end of copied text >  < from: CT Head No Cont (20 @ 12:23) >    EXAM:  CT BRAIN        PROCEDURE DATE:  2020         INTERPRETATION:  .    CLINICAL INFORMATION: unsteady gait, generallized weakness    TECHNIQUE: Multiple axial CT images of the head were obtained without contrast. Sagittal and coronal reconstructed images were acquired from the source data.    COMPARISON: No prior CT studies of the brain are available for comparison at this institution.    FINDINGS: There is no acute intracranial hemorrhage, mass effect, shift of the midline structures, herniation, extra-axial fluid collection, or hydrocephalus.    There is diffuse cerebral volume loss with prominence of the sulci, fissures, and cisternal spaces which is normal for the patient's age. There is moderate patchy confluent deep and periventricular white matter hypoattenuation statistically compatible with microvascular changes given calcific atherosclerotic disease of the intracranial arteries.    The paranasal sinuses and mastoid air cells are clear. The calvarium is intact. Bilateral senile scleral plaques are noted.    IMPRESSION: No acute intracranial hemorrhage, mass effect, or shift of the midline structures.    Moderate severity chronic white matter microvascular type changes.              RAÚL MOSLEY MD; Attending Radiologist  This document has been electronically signed. 2020 12:25PM    < end of copied text >  < from: Transthoracic Echocardiogram (20 @ 17:28) >    Patient name: ABDIRASHID QUIROGA  YOB: 1940   Age: 80 (M)   MR#: [id number]  Study Date: 2020  Location: [id number]: Karolina Hong RDCS  Study quality: Technically Fair  Referring Physician: Ankit Guerrero MD  Blood Pressure: 121/62 mmHg  Height: 173 cm  Weight: 73 kg  BSA: 1.9 m2  ------------------------------------------------------------------------  PROCEDURE: Transthoracic echocardiogram with 2-D, M-Mode  and complete spectral and color flow Doppler.  INDICATION: Syncope and collapse (R55)  ------------------------------------------------------------------------  DIMENSIONS:  Dimensions:     Normal Values:  LA:     3.9 cm    2.0 - 4.0 cm  Ao:     3.3 cm    2.0 - 3.8 cm  SEPTUM: 0.7 cm    0.6 - 1.2 cm  PWT:    0.7 cm    0.6 - 1.1 cm  LVIDd:  4.5 cm    3.0 - 5.6 cm  LVIDs:  3.0 cm    1.8 - 4.0 cm  Derived Variables:  LVMI: 51 g/m2  RWT: 0.31  Fractional short: 33 %  Ejection Fraction (Teicholtz): 62 %  ------------------------------------------------------------------------  OBSERVATIONS:  Mitral Valve: Mitral annular calcification, otherwise  normal mitral valve. Mild mitral regurgitation.  Aortic Root: Normal aortic root.  Aortic Valve: Calcified trileaflet aortic valve with normal  opening. Minimal aortic regurgitation.  Left Atrium: Normal left atrium.  LA volume index = 28  cc/m2.  Left Ventricle: Endocardium not well visualized; grossly  normal left ventricular systolic function. Normal left  ventricular internal dimensions and wall thicknesses. Mild  diastolic dysfunction (Stage I).  Right Heart: Normal right atrium. The right ventricle is  not well visualized; grossly normal right ventricular  systolic function. Normal tricuspid valve.  Minimal  tricuspid regurgitation. Normal pulmonic valve.  Pericardium/PleuraNormal pericardium with no pericardial  effusion.  ------------------------------------------------------------------------  CONCLUSIONS:  1. Mitral annular calcification, otherwise normal mitral  valve. Mild mitral regurgitation.  2. Normal left ventricular internal dimensions and wall  thicknesses.  3. Endocardium not well visualized; grossly normal left  ventricular systolic function.  4. Mild diastolic dysfunction (Stage I).  5. The right ventricle is not wellvisualized; grossly  normal right ventricular systolic function.  ------------------------------------------------------------------------  Confirmed on  2020 - 19:39:33 by Alexsander Santizo M.D.  ----------    < end of copied text >

## 2020-11-13 NOTE — PROVIDER CONTACT NOTE (OTHER) - BACKGROUND
81 yo with hx of CAD s/p CABG, HTN, HLD, CKDIII, COPD, glaucoma, presents with dizziness, hypotension, LAUREN

## 2020-11-14 NOTE — PROGRESS NOTE ADULT - ASSESSMENT
ASSESSMENT/PLAN:  80M h/o CAD s/p CABG, HTN, HLD, CKD III, COPD, glaucoma, presents w/ dizziness.  Was this simple dehydration--hypotensive?  +orthostatic by BP despite elevated BP  Elevated creatinine and likely LAUREN but unclear baseline. 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  At present hold off on MRI    Shae Maria, NP-C  [facility], [place]  [phone] ASSESSMENT/PLAN:  80M h/o CAD s/p CABG, HTN, HLD, CKD III, COPD, glaucoma, presents w/ dizziness.  Was this simple dehydration--hypotensive?  +orthostatic by BP despite elevated BP  Elevated creatinine and likely LAUREN but unclear baseline. 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  Trend the serum creatinine; slightly lower; if able renal sono BUT this can be as outpatient   2 CVS-EF is preserved and not in heart failure;  On tele;  Preserved EF   3 Neuro- Eval to be done as outpt.  At present hold off on MRI    Shae Maria NP-C  [facility]  [phone]

## 2020-11-14 NOTE — PHYSICAL THERAPY INITIAL EVALUATION ADULT - PERTINENT HX OF CURRENT PROBLEM, REHAB EVAL
Pt is an 80 year old male presenting with dizziness and hypotension. Pt with (+) orthostatics. PMH: CAD s/p CABG, HTN, HLD, CKD III, COPD, glaucoma

## 2020-11-14 NOTE — PROGRESS NOTE ADULT - SUBJECTIVE AND OBJECTIVE BOX
NEPHROLOGY     Patient seen and examined with daughter at bedside.  Pt without complaints, daughter reports pt still with dizziness, no pain or sob, in no acute distress. Noted with elevated BP last night and early this morning, BP now soft     MEDICATIONS  (STANDING):  artificial tears (preservative free) Ophthalmic Solution 1 Drop(s) Both EYES four times a day  aspirin enteric coated 81 milliGRAM(s) Oral daily  atorvastatin 40 milliGRAM(s) Oral at bedtime  budesonide 160 MICROgram(s)/formoterol 4.5 MICROgram(s) Inhaler 2 Puff(s) Inhalation two times a day  calcitriol   Capsule 0.25 MICROGram(s) Oral daily  heparin   Injectable 5000 Unit(s) SubCutaneous every 8 hours  sodium chloride 0.9%. 1000 milliLiter(s) (50 mL/Hr) IV Continuous <Continuous>    VITALS:  T(C): , Max: 37 (11-14-20 @ 05:45)  T(F): , Max: 98.6 (11-14-20 @ 05:45)  HR: 59 (11-14-20 @ 11:20)  BP: 104/70 (11-14-20 @ 11:20)  RR: 17 (11-14-20 @ 11:20)  SpO2: 100% (11-14-20 @ 11:20)    I and O's:    11-13 @ 07:01  -  11-14 @ 07:00  --------------------------------------------------------  IN: 1510 mL / OUT: 1855 mL / NET: -345 mL    11-14 @ 07:01  -  11-14 @ 17:55  --------------------------------------------------------  IN: 620 mL / OUT: 660 mL / NET: -40 mL    Weight (kg): 64.1 (11-13 @ 21:14)    PHYSICAL EXAM:  Constitutional: NAD  HEENT: EOMI  Neck:  No JVD, supple   Respiratory: CTA B/L  Cardiovascular: S1 and S2, RRR  Gastrointestinal: + BS, soft, NT, ND  Extremities: No peripheral edema, + peripheral pulses  Neurological: A/O x 3, CN2-12 intact  Psychiatric: Normal mood, normal affect  : No Terrell  Skin: No rashes, C/D/I    LABS:                        9.7    6.67  )-----------( 186      ( 14 Nov 2020 05:30 )             31.2     11-14    137  |  110<H>  |  22  ----------------------------<  94  3.8   |  16<L>  |  2.26<H>    Ca    8.9      14 Nov 2020 05:30  Phos  3.0     11-14  Mg     1.7     11-14     NEPHROLOGY     Patient seen and examined with daughter at bedside.  Pt without complaints, daughter reports pt still with dizziness, no pain or sob, in no acute distress. Noted with elevated BP last night and early this morning, BP now soft.    MEDICATIONS  (STANDING):  artificial tears (preservative free) Ophthalmic Solution 1 Drop(s) Both EYES four times a day  aspirin enteric coated 81 milliGRAM(s) Oral daily  atorvastatin 40 milliGRAM(s) Oral at bedtime  budesonide 160 MICROgram(s)/formoterol 4.5 MICROgram(s) Inhaler 2 Puff(s) Inhalation two times a day  calcitriol   Capsule 0.25 MICROGram(s) Oral daily  heparin   Injectable 5000 Unit(s) SubCutaneous every 8 hours  sodium chloride 0.9%. 1000 milliLiter(s) (50 mL/Hr) IV Continuous <Continuous>    VITALS:  T(C): , Max: 37 (11-14-20 @ 05:45)  T(F): , Max: 98.6 (11-14-20 @ 05:45)  HR: 59 (11-14-20 @ 11:20)  BP: 104/70 (11-14-20 @ 11:20)  RR: 17 (11-14-20 @ 11:20)  SpO2: 100% (11-14-20 @ 11:20)    I and O's:    11-13 @ 07:01  -  11-14 @ 07:00  --------------------------------------------------------  IN: 1510 mL / OUT: 1855 mL / NET: -345 mL    11-14 @ 07:01  -  11-14 @ 17:55  --------------------------------------------------------  IN: 620 mL / OUT: 660 mL / NET: -40 mL    Weight (kg): 64.1 (11-13 @ 21:14)    PHYSICAL EXAM:  Constitutional: NAD  HEENT: EOMI  Neck:  No JVD, supple   Respiratory: CTA B/L  Cardiovascular: S1 and S2, RRR  Gastrointestinal: + BS, soft, NT, ND  Extremities: No peripheral edema, + peripheral pulses  Neurological: A/O x 3, CN2-12 intact  Psychiatric: Normal mood, normal affect  : No Terrell  Skin: No rashes, C/D/I    LABS:                        9.7    6.67  )-----------( 186      ( 14 Nov 2020 05:30 )             31.2     11-14    137  |  110<H>  |  22  ----------------------------<  94  3.8   |  16<L>  |  2.26<H>    Ca    8.9      14 Nov 2020 05:30  Phos  3.0     11-14  Mg     1.7     11-14

## 2020-11-14 NOTE — PHYSICAL THERAPY INITIAL EVALUATION ADULT - PATIENT PROFILE REVIEW, REHAB EVAL
PT orders received: Ambulate with assistance. Consult with RN Sandy CRUZ, patient may participate in PT evaluation./yes

## 2020-11-14 NOTE — PROGRESS NOTE ADULT - SUBJECTIVE AND OBJECTIVE BOX
Patient denies chest pain or shortness of breath.   Review of systems otherwise (-)  	  chief complaint: dizziness    extended hpi: with > 4 characteristics:    Review of Systems:   Constitutional: [ ] fevers, [ ] chills.   Skin: [ ] dry skin. [ ] rashes.  Psychiatric: [ ] depression, [ ] anxiety.   Gastrointestinal: [ ] BRBPR, [ ] melena.   Neurological: [ ] confusion. [ ] seizures. [ ] shuffling gait.   Ears,Nose,Mouth and Throat: [ ] ear pain [ ] sore throat.   Eyes: [ ] diplopia.   Respiratory: [ ] hemoptysis. [ ] shortness of breath  Cardiovascular: See HPI above  Hematologic/Lymphatic: [ ] anemia. [ ] painful nodes. [ ] prolonged bleeding.   Genitourinary: [ ] hematuria. [ ] flank pain.   Endocrine: [ ] significant change in weight. [ ] intolerance to heat and cold.     Review of systems [ x] otherwise negative, [ ] otherwise unable to obtain    FH: no family history of sudden cardiac death in first degree relatives    SH: [ ] tobacco, [ ] alcohol, [ ] drugs    artificial tears (preservative free) Ophthalmic Solution 1 Drop(s) Both EYES four times a day  aspirin enteric coated 81 milliGRAM(s) Oral daily  atorvastatin 40 milliGRAM(s) Oral at bedtime  budesonide 160 MICROgram(s)/formoterol 4.5 MICROgram(s) Inhaler 2 Puff(s) Inhalation two times a day  calcitriol   Capsule 0.25 MICROGram(s) Oral daily  heparin   Injectable 5000 Unit(s) SubCutaneous every 8 hours  senna 2 Tablet(s) Oral at bedtime PRN  sodium chloride 0.9%. 1000 milliLiter(s) IV Continuous <Continuous>                            9.7    6.67  )-----------( 186      ( 14 Nov 2020 05:30 )             31.2       11-14    137  |  110<H>  |  22  ----------------------------<  94  3.8   |  16<L>  |  2.26<H>    Ca    8.9      14 Nov 2020 05:30  Phos  3.0     11-14  Mg     1.7     11-14        CARDIAC MARKERS ( 12 Nov 2020 19:19 )  x     / x     / 72 u/L / 3.08 ng/mL / x      CARDIAC MARKERS ( 12 Nov 2020 17:11 )  x     / x     / 68 u/L / 2.88 ng/mL / x            T(C): 36.4 (11-14-20 @ 11:20), Max: 37 (11-14-20 @ 05:45)  HR: 59 (11-14-20 @ 11:20) (59 - 96)  BP: 104/70 (11-14-20 @ 11:20) (102/62 - 189/94)  RR: 17 (11-14-20 @ 11:20) (17 - 18)  SpO2: 100% (11-14-20 @ 11:20) (96% - 100%)      General: Well nourished in no acute distress. Alert and Oriented * 3.   Head: Normocephalic and atraumatic.   Neck: No JVD. No bruits. Supple. Does not appear to be enlarged.   Cardiovascular: + S1,S2 ; RRR Soft systolic murmur at the left lower sternal border. No rubs noted.    Lungs: CTA b/l. No rhonchi, rales or wheezes.   Abdomen: + BS, soft. Non tender. Non distended. No rebound. No guarding.   Extremities: No clubbing/cyanosis/edema.   Neurologic: Moves all four extremities. Full range of motion.   Skin: Warm and moist. The patient's skin has normal elasticity and good skin turgor.   Psychiatric: Appropriate mood and affect.  Musculoskeletal: Normal range of motion, normal strength    TELEMETRY: 	SR        < from: CT Head No Cont (11.11.20 @ 12:23) >  IMPRESSION: No acute intracranial hemorrhage, mass effect, or shift of the midline structures.    Moderate severity chronic white matter microvascular type changes.  < end of copied text >      ASSESSMENT/PLAN: 	    80 year old male with history of CAD s/p prior CABG, HTN, HLD, CKD, COPD, glaucoma who was admitted with dizziness.    --CTH negative for CVA  --Appreciate neuro evaluation - will hold off on further neuro imaging unless clinical condition changes per neuro   --TTE with normal LV function   --Check NST   --+orthostatics  --gentle IVF and place TEDS stockings and repeat

## 2020-11-14 NOTE — PHYSICAL THERAPY INITIAL EVALUATION ADULT - GENERAL OBSERVATIONS, REHAB EVAL
Pt received semisupine in bed, +telemetry, in NAD. Pt agreeable to participate in physical therapy evaluation.

## 2020-11-16 NOTE — PROGRESS NOTE ADULT - SUBJECTIVE AND OBJECTIVE BOX
Patient denies chest pain or shortness of breath.   Review of systems otherwise (-)  	  chief complaint: dizziness    extended hpi: 80 year old male with history of CAD s/p prior CABG, HTN, HLD, CKD, COPD, glaucoma who was admitted with dizziness.    Review of Systems:   Constitutional: [ ] fevers, [ ] chills.   Skin: [ ] dry skin. [ ] rashes.  Psychiatric: [ ] depression, [ ] anxiety.   Gastrointestinal: [ ] BRBPR, [ ] melena.   Neurological: [ ] confusion. [ ] seizures. [ ] shuffling gait.   Ears,Nose,Mouth and Throat: [ ] ear pain [ ] sore throat.   Eyes: [ ] diplopia.   Respiratory: [ ] hemoptysis. [ ] shortness of breath  Cardiovascular: See HPI above  Hematologic/Lymphatic: [ ] anemia. [ ] painful nodes. [ ] prolonged bleeding.   Genitourinary: [ ] hematuria. [ ] flank pain.   Endocrine: [ ] significant change in weight. [ ] intolerance to heat and cold.     Review of systems [ x] otherwise negative, [ ] otherwise unable to obtain    FH: no family history of sudden cardiac death in first degree relatives    SH: [ ] tobacco, [ ] alcohol, [ ] drugs    artificial tears (preservative free) Ophthalmic Solution 1 Drop(s) Both EYES four times a day  aspirin enteric coated 81 milliGRAM(s) Oral daily  atorvastatin 40 milliGRAM(s) Oral at bedtime  budesonide 160 MICROgram(s)/formoterol 4.5 MICROgram(s) Inhaler 2 Puff(s) Inhalation two times a day  calcitriol   Capsule 0.25 MICROGram(s) Oral daily  heparin   Injectable 5000 Unit(s) SubCutaneous every 8 hours  senna 2 Tablet(s) Oral at bedtime PRN  sodium chloride 0.9%. 1000 milliLiter(s) IV Continuous <Continuous>    acetaminophen   Tablet .. 650 milliGRAM(s) Oral every 6 hours PRN  artificial tears (preservative free) Ophthalmic Solution 1 Drop(s) Both EYES four times a day  aspirin enteric coated 81 milliGRAM(s) Oral daily  atorvastatin 40 milliGRAM(s) Oral at bedtime  budesonide 160 MICROgram(s)/formoterol 4.5 MICROgram(s) Inhaler 2 Puff(s) Inhalation two times a day  calcitriol   Capsule 0.25 MICROGram(s) Oral daily  heparin   Injectable 5000 Unit(s) SubCutaneous every 8 hours  senna 2 Tablet(s) Oral at bedtime PRN                            9.8    7.28  )-----------( 187      ( 16 Nov 2020 06:55 )             32.5       11-16    136  |  108<H>  |  28<H>  ----------------------------<  74  3.5   |  16<L>  |  2.47<H>    Ca    9.0      16 Nov 2020 06:55  Phos  4.1     11-16  Mg     2.0     11-16              T(C): 36.6 (11-16-20 @ 11:53), Max: 36.9 (11-16-20 @ 04:49)  HR: 81 (11-16-20 @ 11:53) (81 - 86)  BP: 159/72 (11-16-20 @ 11:53) (157/72 - 174/80)  RR: 18 (11-16-20 @ 11:53) (16 - 18)  SpO2: 100% (11-16-20 @ 11:53) (97% - 100%)  Wt(kg): --    I&O's Summary    15 Nov 2020 07:01  -  16 Nov 2020 07:00  --------------------------------------------------------  IN: 592 mL / OUT: 500 mL / NET: 92 mL          General: Well nourished in no acute distress. Alert and Oriented * 3.   Head: Normocephalic and atraumatic.   Neck: No JVD. No bruits. Supple. Does not appear to be enlarged.   Cardiovascular: + S1,S2 ; RRR Soft systolic murmur at the left lower sternal border. No rubs noted.    Lungs: CTA b/l. No rhonchi, rales or wheezes.   Abdomen: + BS, soft. Non tender. Non distended. No rebound. No guarding.   Extremities: No clubbing/cyanosis/edema.   Neurologic: Moves all four extremities. Full range of motion.   Skin: Warm and moist. The patient's skin has normal elasticity and good skin turgor.   Psychiatric: Appropriate mood and affect.  Musculoskeletal: Normal range of motion, normal strength    TELEMETRY: 	SR        < from: CT Head No Cont (11.11.20 @ 12:23) >  IMPRESSION: No acute intracranial hemorrhage, mass effect, or shift of the midline structures.    Moderate severity chronic white matter microvascular type changes.  < end of copied text >      ASSESSMENT/PLAN: 	    80 year old male with history of CAD s/p prior CABG, HTN, HLD, CKD, COPD, glaucoma who was admitted with dizziness.    --CTH negative for CVA  --Appreciate neuro evaluation - will hold off on further neuro imaging unless clinical condition changes per neuro   --TTE with normal LV function   --NST normal with no ischemia or infarct   --+orthostatics - continue with IVF and stockings  --monitor orthostatics - anticipate dc home when orthostatics improve and patient asymptomatic    Ankit Guerrero MD, FACC

## 2020-11-16 NOTE — PROGRESS NOTE ADULT - SUBJECTIVE AND OBJECTIVE BOX
NEPHROLOGY-[facility] [phone]        Patient seen and examined in bed.  He was in good spirits         MEDICATIONS  (STANDING):  artificial tears (preservative free) Ophthalmic Solution 1 Drop(s) Both EYES four times a day  aspirin enteric coated 81 milliGRAM(s) Oral daily  atorvastatin 40 milliGRAM(s) Oral at bedtime  budesonide 160 MICROgram(s)/formoterol 4.5 MICROgram(s) Inhaler 2 Puff(s) Inhalation two times a day  calcitriol   Capsule 0.25 MICROGram(s) Oral daily  heparin   Injectable 5000 Unit(s) SubCutaneous every 8 hours  sodium chloride 0.9%. 1000 milliLiter(s) (60 mL/Hr) IV Continuous <Continuous>      VITAL:  T(C): , Max: 36.9 (11-15-20 @ 10:00)  T(F): , Max: 98.5 (11-15-20 @ 10:00)  HR: 85 (11-16-20 @ 04:49)  BP: 174/80 (11-16-20 @ 04:49)  BP(mean): --  RR: 17 (11-16-20 @ 04:49)  SpO2: 97% (11-16-20 @ 04:49)  Wt(kg): --    I and O's:    11-15 @ 07:01  -  11-16 @ 07:00  --------------------------------------------------------  IN: 592 mL / OUT: 500 mL / NET: 92 mL          PHYSICAL EXAM:    Constitutional: NAD  Neck:  No JVD  Respiratory: CTAB/L  Cardiovascular: S1 and S2  Gastrointestinal: BS+, soft, NT/ND  Extremities: No peripheral edema  Neurological: A/O x 3, no focal deficits  Psychiatric: Normal mood, normal affect  : No Terrell  Skin: No rashes  Access: Not applicable    LABS:                        9.4    6.66  )-----------( 180      ( 15 Nov 2020 06:31 )             30.6     11-15    135  |  109<H>  |  25<H>  ----------------------------<  79  3.7   |  16<L>  |  2.44<H>    Ca    8.9      15 Nov 2020 06:31  Phos  3.7     11-15  Mg     2.3     11-15            Urine Studies:          RADIOLOGY & ADDITIONAL STUDIES:

## 2020-11-16 NOTE — PROGRESS NOTE ADULT - ASSESSMENT
ASSESSMENT/PLAN:  80M h/o CAD s/p CABG, HTN, HLD, CKD III, COPD, glaucoma, presents w/ dizziness.     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  DC IVF now   2 CVS-EF is preserved and not in heart failure;  On tele;  Preserved EF and stress was negative.  Need to maintain isolated systolic given 40 point drop in BP  3 Neuro- Eval to be done as outpt.  At present hold off on MRI    Northera to be given as outpt   No renal objection to dc with close outpt follow up     Sayed Pasha   [facility]  [phone]

## 2020-11-17 NOTE — DISCHARGE NOTE PROVIDER - NSDCFUADDAPPT_GEN_ALL_CORE_FT
Please follow up with your cardiologist within 1-2 weeks of discharge. Call to schedule an appointment.

## 2020-11-17 NOTE — DISCHARGE NOTE PROVIDER - CARE PROVIDER_API CALL
Edil Pak)  Internal Medicine; Nephrology  [address], [address]  [place] [address]  Phone: [phone]  Fax: [phone]  Follow Up Time:     Ankit Guerrero  CARDIOVASCULAR DISEASE  [address] [place] [address]  Phone: [phone]  Fax: [phone]  Follow Up Time:

## 2020-11-17 NOTE — DISCHARGE NOTE PROVIDER - NSDCMRMEDTOKEN_GEN_ALL_CORE_FT
aspirin 81 mg oral delayed release tablet: 1 tab(s) orally once a day  calcitriol 0.25 mcg oral capsule: 1 cap(s) orally once a day  Dulera 200 mcg-5 mcg/inh inhalation aerosol: 2 puff(s) inhaled 2 times a day  Lipitor 40 mg oral tablet: 1 tab(s) orally once a day  Refresh ophthalmic solution: 1 drop(s) to each affected eye 4 times a day   aspirin 81 mg oral delayed release tablet: 1 tab(s) orally once a day  calcitriol 0.25 mcg oral capsule: 1 cap(s) orally once a day  Dulera 200 mcg-5 mcg/inh inhalation aerosol: 2 puff(s) inhaled 2 times a day  Lipitor 40 mg oral tablet: 1 tab(s) orally once a day  midodrine 2.5 mg oral tablet: 1 tab(s) orally 3 times a day  Refresh ophthalmic solution: 1 drop(s) to each affected eye 4 times a day

## 2020-11-17 NOTE — PROGRESS NOTE ADULT - SUBJECTIVE AND OBJECTIVE BOX
NEPHROLOGY-[facility] [phone]        Patient seen and examined in bed.  He was still orthostatic this am         MEDICATIONS  (STANDING):  artificial tears (preservative free) Ophthalmic Solution 1 Drop(s) Both EYES four times a day  aspirin enteric coated 81 milliGRAM(s) Oral daily  atorvastatin 40 milliGRAM(s) Oral at bedtime  budesonide 160 MICROgram(s)/formoterol 4.5 MICROgram(s) Inhaler 2 Puff(s) Inhalation two times a day  calcitriol   Capsule 0.25 MICROGram(s) Oral daily  heparin   Injectable 5000 Unit(s) SubCutaneous every 8 hours  sodium chloride 0.9%. 1000 milliLiter(s) (100 mL/Hr) IV Continuous <Continuous>      VITAL:  T(C): , Max: 37.1 (11-16-20 @ 22:02)  T(F): , Max: 98.7 (11-16-20 @ 22:02)  HR: 75 (11-17-20 @ 06:29)  BP: 162/82 (11-17-20 @ 06:29)  BP(mean): --  RR: 17 (11-17-20 @ 06:29)  SpO2: 99% (11-17-20 @ 06:29)  Wt(kg): --    I and O's:    11-16 @ 07:01  -  11-17 @ 07:00  --------------------------------------------------------  IN: 830 mL / OUT: 950 mL / NET: -120 mL          PHYSICAL EXAM:    Constitutional: NAD  Neck:  No JVD  Respiratory: CTAB/L  Cardiovascular: S1 and S2  Gastrointestinal: BS+, soft, NT/ND  Extremities: No peripheral edema  Neurological: A/O x 3, no focal deficits  Psychiatric: Normal mood, normal affect  : No Terrell  Skin: No rashes  Access: Not applicable    LABS:                        9.5    5.99  )-----------( 179      ( 17 Nov 2020 05:30 )             31.1     11-17    139  |  108<H>  |  32<H>  ----------------------------<  83  3.5   |  17<L>  |  2.71<H>    Ca    9.3      17 Nov 2020 05:30  Phos  4.3     11-17  Mg     2.1     11-17            Urine Studies:          RADIOLOGY & ADDITIONAL STUDIES:

## 2020-11-17 NOTE — PROGRESS NOTE ADULT - SUBJECTIVE AND OBJECTIVE BOX
Patient denies chest pain or shortness of breath.   Review of systems otherwise (-)  	  chief complaint: dizziness    extended hpi: 80 year old male with history of CAD s/p prior CABG, HTN, HLD, CKD, COPD, glaucoma who was admitted with dizziness.    Review of Systems:   Constitutional: [ ] fevers, [ ] chills.   Skin: [ ] dry skin. [ ] rashes.  Psychiatric: [ ] depression, [ ] anxiety.   Gastrointestinal: [ ] BRBPR, [ ] melena.   Neurological: [ ] confusion. [ ] seizures. [ ] shuffling gait.   Ears,Nose,Mouth and Throat: [ ] ear pain [ ] sore throat.   Eyes: [ ] diplopia.   Respiratory: [ ] hemoptysis. [ ] shortness of breath  Cardiovascular: See HPI above  Hematologic/Lymphatic: [ ] anemia. [ ] painful nodes. [ ] prolonged bleeding.   Genitourinary: [ ] hematuria. [ ] flank pain.   Endocrine: [ ] significant change in weight. [ ] intolerance to heat and cold.     Review of systems [ x] otherwise negative, [ ] otherwise unable to obtain    FH: no family history of sudden cardiac death in first degree relatives    SH: [ ] tobacco, [ ] alcohol, [ ] drugs    acetaminophen   Tablet .. 650 milliGRAM(s) Oral every 6 hours PRN  artificial tears (preservative free) Ophthalmic Solution 1 Drop(s) Both EYES four times a day  aspirin enteric coated 81 milliGRAM(s) Oral daily  atorvastatin 40 milliGRAM(s) Oral at bedtime  budesonide 160 MICROgram(s)/formoterol 4.5 MICROgram(s) Inhaler 2 Puff(s) Inhalation two times a day  calcitriol   Capsule 0.25 MICROGram(s) Oral daily  heparin   Injectable 5000 Unit(s) SubCutaneous every 8 hours  senna 2 Tablet(s) Oral at bedtime PRN                            9.5    5.99  )-----------( 179      ( 17 Nov 2020 05:30 )             31.1       11-17    139  |  108<H>  |  32<H>  ----------------------------<  83  3.5   |  17<L>  |  2.71<H>    Ca    9.3      17 Nov 2020 05:30  Phos  4.3     11-17  Mg     2.1     11-17              T(C): 37 (11-17-20 @ 12:59), Max: 37.1 (11-16-20 @ 22:02)  HR: 77 (11-17-20 @ 12:59) (75 - 81)  BP: 168/70 (11-17-20 @ 13:00) (155/75 - 176/75)  RR: 16 (11-17-20 @ 12:59) (16 - 17)  SpO2: 98% (11-17-20 @ 12:59) (98% - 100%)  Wt(kg): --    I&O's Summary    16 Nov 2020 07:01  -  17 Nov 2020 07:00  --------------------------------------------------------  IN: 830 mL / OUT: 950 mL / NET: -120 mL    17 Nov 2020 07:01  -  17 Nov 2020 16:44  --------------------------------------------------------  IN: 236 mL / OUT: 200 mL / NET: 36 mL          General: Well nourished in no acute distress. Alert and Oriented * 3.   Head: Normocephalic and atraumatic.   Neck: No JVD. No bruits. Supple. Does not appear to be enlarged.   Cardiovascular: + S1,S2 ; RRR Soft systolic murmur at the left lower sternal border. No rubs noted.    Lungs: CTA b/l. No rhonchi, rales or wheezes.   Abdomen: + BS, soft. Non tender. Non distended. No rebound. No guarding.   Extremities: No clubbing/cyanosis/edema.   Neurologic: Moves all four extremities. Full range of motion.   Skin: Warm and moist. The patient's skin has normal elasticity and good skin turgor.   Psychiatric: Appropriate mood and affect.  Musculoskeletal: Normal range of motion, normal strength    TELEMETRY: 	SR        < from: CT Head No Cont (11.11.20 @ 12:23) >  IMPRESSION: No acute intracranial hemorrhage, mass effect, or shift of the midline structures.    Moderate severity chronic white matter microvascular type changes.  < end of copied text >      ASSESSMENT/PLAN: 	    80 year old male with history of CAD s/p prior CABG, HTN, HLD, CKD, COPD, glaucoma who was admitted with dizziness.    --CTH negative for CVA  --Appreciate neuro evaluation - will hold off on further neuro imaging unless clinical condition changes per neuro   --TTE with normal LV function   --NST normal with no ischemia or infarct   --+orthostatics - continue with IVF and stockings  --monitor orthostatics - anticipate dc home when orthostatics improve and patient asymptomatic  --renal follow up appreciated    Ankit Guerrero MD, FACC

## 2020-11-17 NOTE — DISCHARGE NOTE PROVIDER - NSDCCPCAREPLAN_GEN_ALL_CORE_FT
PRINCIPAL DISCHARGE DIAGNOSIS  Diagnosis: Near syncope  Assessment and Plan of Treatment: You were initially managed with IV fluids, and it was deemed you are no longer dehydrated however you still had orthostatic hypotension (decreased blood pressure from lying, to sitting, to standing). This is likely due to autonomic dysfunction. It is recommended that you continue to wear compression stocking. Do not change positions too quickly. Out patient follow up with primary care provider as you may be a candidate for Northera.      SECONDARY DISCHARGE DIAGNOSES  Diagnosis: Glaucoma, unspecified glaucoma type, unspecified laterality  Assessment and Plan of Treatment: Continue with eye drops.    Diagnosis: Coronary artery disease involving native coronary artery of native heart without angina pectoris  Assessment and Plan of Treatment: Continue with Aspirin and statin.    Diagnosis: Stage 3 chronic kidney disease, unspecified whether stage 3a or 3b CKD  Assessment and Plan of Treatment: In order to prevent further disease progression, continue to follow recommendations made by your primary provider/nephrologist. Continue a diet that is low in sodium and avoid foods that are concentrated in potassium and phosphorus. Continue your medications/supplementations as directed and avoid over-the-counter drugs that are harmful to kidneys, such as, Non-Steroidal Anti-Inflammatory Drugs (NSAIDs). Follow-up as outpatient to monitor your kidney function, as well as, vitamin D, Calcium, potassium, and phosphorus levels.     PRINCIPAL DISCHARGE DIAGNOSIS  Diagnosis: Near syncope  Assessment and Plan of Treatment: You were initially managed with IV fluids, and it was deemed you are no longer dehydrated however you still had orthostatic hypotension (decreased blood pressure from lying, to sitting, to standing). This is likely due to autonomic dysfunction. It is recommended that you continue to wear compression stocking. Do not change positions too quickly. Continue with Midodrine 2.5 mg every 8hrs. Out patient follow up with primary care provider and cardiologist within 1-2 weeks of discharge, at that time they can evaluate if you are a candidate for Northera.      SECONDARY DISCHARGE DIAGNOSES  Diagnosis: Glaucoma, unspecified glaucoma type, unspecified laterality  Assessment and Plan of Treatment: Continue with eye drops.    Diagnosis: Coronary artery disease involving native coronary artery of native heart without angina pectoris  Assessment and Plan of Treatment: Continue with Aspirin and statin.    Diagnosis: Stage 3 chronic kidney disease, unspecified whether stage 3a or 3b CKD  Assessment and Plan of Treatment: In order to prevent further disease progression, continue to follow recommendations made by your primary provider/nephrologist. Continue a diet that is low in sodium and avoid foods that are concentrated in potassium and phosphorus. Continue your medications/supplementations as directed and avoid over-the-counter drugs that are harmful to kidneys, such as, Non-Steroidal Anti-Inflammatory Drugs (NSAIDs). Follow-up as outpatient to monitor your kidney function, as well as, vitamin D, Calcium, potassium, and phosphorus levels.

## 2020-11-17 NOTE — DISCHARGE NOTE PROVIDER - HOSPITAL COURSE
80M h/o CAD s/p CABG, HTN, HLD, CKD III, COPD, glaucoma, presents w/ dizziness, found to be hypotensive.    Hospital course:     Near syncope  - Likely d/t hypotension/hypovolemia   - S/p IVF - baseline blood pressure improved however continued to remain orthostatic   - CT head w/ no acute findings   - TTE done  grossly normal left ventricular systolic function. Normal left ventricular internal dimensions and wall thicknesses. Mild diastolic dysfunction (Stage I).  - S/p Nuclear Stress Test:Normal Study  - PT eval: no needs   - Pt s/p IVF, no longer dehydrated but still with positive orthostatics likely autonomic dysfunction. Compression stocking implemented. Pt likely needs Northera as out patient       LAUREN on CKD III   - likely pre-renal, s/p IVF. LAUREN resolved      CAD  - Elevated troponins likely d/t decreased renal clearance   - Troponins 114-->95, CK flat   - EKG w/ SR, no ischemic changes   - ASA/statin    Pt medically stable for discharge on ____ as per discussion with ____.        80M h/o CAD s/p CABG, HTN, HLD, CKD III, COPD, glaucoma, presents w/ dizziness, found to be hypotensive.    Hospital course:     Near syncope  - Likely d/t hypotension/hypovolemia   - S/p IVF - baseline blood pressure improved however continued to remain orthostatic   - CT head w/ no acute findings   - TTE done  grossly normal left ventricular systolic function. Normal left ventricular internal dimensions and wall thicknesses. Mild diastolic dysfunction (Stage I).  - S/p Nuclear Stress Test:Normal Study  - PT eval: no needs   - Pt s/p IVF, no longer dehydrated but still with positive orthostatics likely autonomic dysfunction. Compression stocking implemented. Started on Midodrine 2.5 mg Q 8hrs. Pt likely needs Northera as out patient       LAUREN on CKD III   - likely pre-renal, s/p IVF. LAUREN resolved      CAD  - Elevated troponins likely d/t decreased renal clearance   - Troponins 114-->95, CK flat   - EKG w/ SR, no ischemic changes   - ASA/statin    Pt medically stable for discharge on 11/18/2020 as per discussion with Dr. Guerrero.

## 2020-11-17 NOTE — PROGRESS NOTE ADULT - ASSESSMENT
ASSESSMENT/PLAN:  80M h/o CAD s/p CABG, HTN, HLD, CKD III, COPD, glaucoma, presents w/ dizziness.     +orthostatic by BP despite elevated BP and despite appropriate fluid resusitation   Elevated creatininemay be underlying CKD from Hypertensive nephrosclerosis(the fact that he is on calcitriol ensues CKD)    1 Renal -Another run of IVF but after this he is well hydrated and additional IVF will not help(he is not dehydrated;  If he remains orthostatic then Midodrine and as outpt will assess for Northera      2 CVS-EF is preserved and not in heart failure;  On tele;  Preserved EF and stress was negative.  Need to maintain isolated systolic hypertension given 40 point drop in BP  3 Neuro- Eval to be done as outpt.  At present hold off on MRI       Sayed Pasha   [facility]  [phone]

## 2020-11-18 NOTE — PROGRESS NOTE ADULT - NSTELEHEALTH_GEN_ALL_CORE
No
You can access the FollowMyHealth Patient Portal offered by [facility] by registering at the following website: [email or web address]. By joining [employer]’s FollowMyHealth portal, you will also be able to view your health information using other applications (apps) compatible with our system.

## 2020-11-18 NOTE — DIETITIAN INITIAL EVALUATION ADULT. - ADD RECOMMEND
1. Encourage & assist Pt with meals; Monitor PO diet tolerance; Honor food preferences;       2. Add Nephro-priscila daily for micronutrient coverage;        3. Monitor labs, hydration status;

## 2020-11-18 NOTE — PROGRESS NOTE ADULT - ASSESSMENT
ASSESSMENT/PLAN:  80M h/o CAD s/p CABG, HTN, HLD, CKD III, COPD, glaucoma, presents w/ dizziness.     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  Start Midodrine 2.5 tid now and as outpt will assess for Northera    He will need isolated systolic hypertension as his orthostatic drop exceeds 40mmhg     2 CVS-EF is preserved and not in heart failure;  On tele;  Preserved EF and stress was negative.  Need to maintain isolated systolic hypertension given 40 point drop in BP  3 Neuro- Eval to be done as outpt.  At present hold off on MRI     DC planning and outpt follow up       Sayed Pasha [facility]  [phone]

## 2020-11-18 NOTE — DISCHARGE NOTE NURSING/CASE MANAGEMENT/SOCIAL WORK - PATIENT PORTAL LINK FT
You can access the FollowMyHealth Patient Portal offered by [facility] by registering at the following website: [email or web address]. By joining [employer]’s FollowMyHealth portal, you will also be able to view your health information using other applications (apps) compatible with our system.

## 2020-11-18 NOTE — PROGRESS NOTE ADULT - SUBJECTIVE AND OBJECTIVE BOX
Patient denies chest pain or shortness of breath.   Review of systems otherwise (-)  	  chief complaint: dizziness    extended hpi: 80 year old male with history of CAD s/p prior CABG, HTN, HLD, CKD, COPD, glaucoma who was admitted with dizziness.    Review of Systems:   Constitutional: [ ] fevers, [ ] chills.   Skin: [ ] dry skin. [ ] rashes.  Psychiatric: [ ] depression, [ ] anxiety.   Gastrointestinal: [ ] BRBPR, [ ] melena.   Neurological: [ ] confusion. [ ] seizures. [ ] shuffling gait.   Ears,Nose,Mouth and Throat: [ ] ear pain [ ] sore throat.   Eyes: [ ] diplopia.   Respiratory: [ ] hemoptysis. [ ] shortness of breath  Cardiovascular: See HPI above  Hematologic/Lymphatic: [ ] anemia. [ ] painful nodes. [ ] prolonged bleeding.   Genitourinary: [ ] hematuria. [ ] flank pain.   Endocrine: [ ] significant change in weight. [ ] intolerance to heat and cold.     Review of systems [ x] otherwise negative, [ ] otherwise unable to obtain    FH: no family history of sudden cardiac death in first degree relatives    SH: [ ] tobacco, [ ] alcohol, [ ] drugs      acetaminophen   Tablet .. 650 milliGRAM(s) Oral every 6 hours PRN  artificial tears (preservative free) Ophthalmic Solution 1 Drop(s) Both EYES four times a day  aspirin enteric coated 81 milliGRAM(s) Oral daily  atorvastatin 40 milliGRAM(s) Oral at bedtime  budesonide 160 MICROgram(s)/formoterol 4.5 MICROgram(s) Inhaler 2 Puff(s) Inhalation two times a day  calcitriol   Capsule 0.25 MICROGram(s) Oral daily  heparin   Injectable 5000 Unit(s) SubCutaneous every 8 hours  melatonin 3 milliGRAM(s) Oral at bedtime PRN  midodrine. 2.5 milliGRAM(s) Oral three times a day  senna 2 Tablet(s) Oral at bedtime PRN                            9.7    5.71  )-----------( 182      ( 18 Nov 2020 07:25 )             31.2       11-18    138  |  110<H>  |  31<H>  ----------------------------<  95  3.6   |  17<L>  |  2.78<H>    Ca    9.2      18 Nov 2020 07:25  Phos  4.3     11-17  Mg     2.1     11-17              T(C): 36.4 (11-18-20 @ 11:56), Max: 36.9 (11-17-20 @ 21:46)  HR: 76 (11-18-20 @ 11:56) (74 - 76)  BP: 147/72 (11-18-20 @ 11:56) (130/70 - 147/72)  RR: 18 (11-18-20 @ 11:56) (17 - 18)  SpO2: 98% (11-18-20 @ 11:56) (98% - 100%)  Wt(kg): --    I&O's Summary    17 Nov 2020 07:01  -  18 Nov 2020 07:00  --------------------------------------------------------  IN: 386 mL / OUT: 400 mL / NET: -14 mL          General: Well nourished in no acute distress. Alert and Oriented * 3.   Head: Normocephalic and atraumatic.   Neck: No JVD. No bruits. Supple. Does not appear to be enlarged.   Cardiovascular: + S1,S2 ; RRR Soft systolic murmur at the left lower sternal border. No rubs noted.    Lungs: CTA b/l. No rhonchi, rales or wheezes.   Abdomen: + BS, soft. Non tender. Non distended. No rebound. No guarding.   Extremities: No clubbing/cyanosis/edema.   Neurologic: Moves all four extremities. Full range of motion.   Skin: Warm and moist. The patient's skin has normal elasticity and good skin turgor.   Psychiatric: Appropriate mood and affect.  Musculoskeletal: Normal range of motion, normal strength    TELEMETRY: 	SR        < from: CT Head No Cont (11.11.20 @ 12:23) >  IMPRESSION: No acute intracranial hemorrhage, mass effect, or shift of the midline structures.    Moderate severity chronic white matter microvascular type changes.  < end of copied text >      ASSESSMENT/PLAN: 	    80 year old male with history of CAD s/p prior CABG, HTN, HLD, CKD, COPD, glaucoma who was admitted with dizziness.    --CTH negative for CVA  --Appreciate neuro evaluation - will hold off on further neuro imaging unless clinical condition changes per neuro   --TTE with normal LV function   --NST normal with no ischemia or infarct   --+orthostatics - continue with IVF and stockings -- midodrine started per renal  --follow up repeat orthostatics - if improving and patient asymptomatic, dc home with outpatient follow up with his primary cardiologist   --discussed with patient and patient's daughter (at patient's request) in detail     Ankit Guerrero MD, FACC

## 2020-11-18 NOTE — DIETITIAN INITIAL EVALUATION ADULT. - OTHER INFO
Pt 81 yo male with PMHx of CAD s/p CABG, HTN, HLD, CKD III, COPD, glaucoma, presented w/ dizziness - per chart review.    At time of visit Pt appears alert, oriented. Pt speaks Vietnamese, this RDN speaks Vietnamese as well. Per Pt his appetite has been not that well for past few months; will recommend to add PO supplement: Ensure - 2x daily. Pt does not follow any therapeutic diet reported. No report of chewing or swallowing difficulty; no report of nausea, vomiting or diarrhea @ this time. Pt not sure about his height or weight or any weight changes PTA.     Of note Pt's diet rx includes DASH/TLC (cholesterol and sodium restricted) restriction. Better food option discussed with Pt; RDN answered concerns related to diet; Pt verbalized understanding.

## 2020-11-18 NOTE — DIETITIAN INITIAL EVALUATION ADULT. - PERTINENT LABORATORY DATA
(11/18) H/H 9.7/31.2 L;         (11/17) Cl 108 H, BUN 32 H, Creat 2.71 H;          (11/12) Albumin 3.0 L

## 2020-11-18 NOTE — PROGRESS NOTE ADULT - SUBJECTIVE AND OBJECTIVE BOX
NEPHROLOGY-[facility] [phone]        Patient seen and examined in bed.  He was about the same  SP IVF yesterday         MEDICATIONS  (STANDING):  artificial tears (preservative free) Ophthalmic Solution 1 Drop(s) Both EYES four times a day  aspirin enteric coated 81 milliGRAM(s) Oral daily  atorvastatin 40 milliGRAM(s) Oral at bedtime  budesonide 160 MICROgram(s)/formoterol 4.5 MICROgram(s) Inhaler 2 Puff(s) Inhalation two times a day  calcitriol   Capsule 0.25 MICROGram(s) Oral daily  heparin   Injectable 5000 Unit(s) SubCutaneous every 8 hours      VITAL:  T(C): , Max: 37 (11-17-20 @ 12:59)  T(F): , Max: 98.6 (11-17-20 @ 12:59)  HR: 74 (11-17-20 @ 21:46)  BP: 130/70 (11-17-20 @ 21:46)  BP(mean): --  RR: 17 (11-18-20 @ 05:37)  SpO2: 100% (11-18-20 @ 05:37)  Wt(kg): --    I and O's:    11-17 @ 07:01  -  11-18 @ 07:00  --------------------------------------------------------  IN: 386 mL / OUT: 400 mL / NET: -14 mL          PHYSICAL EXAM:    Constitutional: NAD  Neck:  No JVD  Respiratory: CTAB/L  Cardiovascular: S1 and S2  Gastrointestinal: BS+, soft, NT/ND  Extremities: No peripheral edema  Neurological: A/O x 3, no focal deficits  Psychiatric: Normal mood, normal affect  : No Terrell  Skin: No rashes  Access: Not applicable    LABS:                        9.7    5.71  )-----------( 182      ( 18 Nov 2020 07:25 )             31.2     11-18    138  |  110<H>  |  31<H>  ----------------------------<  95  3.6   |  17<L>  |  2.78<H>    Ca    9.2      18 Nov 2020 07:25  Phos  4.3     11-17  Mg     2.1     11-17            Urine Studies:          RADIOLOGY & ADDITIONAL STUDIES:

## 2021-01-01 ENCOUNTER — LABORATORY RESULT (OUTPATIENT)
Age: 81
End: 2021-01-01

## 2021-01-01 ENCOUNTER — APPOINTMENT (OUTPATIENT)
Dept: INFECTIOUS DISEASE | Facility: CLINIC | Age: 81
End: 2021-01-01
Payer: MEDICAID

## 2021-01-01 ENCOUNTER — NON-APPOINTMENT (OUTPATIENT)
Age: 81
End: 2021-01-01

## 2021-01-01 ENCOUNTER — OUTPATIENT (OUTPATIENT)
Dept: OUTPATIENT SERVICES | Facility: HOSPITAL | Age: 81
LOS: 1 days | End: 2021-01-01
Payer: MEDICAID

## 2021-01-01 ENCOUNTER — TRANSCRIPTION ENCOUNTER (OUTPATIENT)
Age: 81
End: 2021-01-01

## 2021-01-01 ENCOUNTER — INPATIENT (INPATIENT)
Facility: HOSPITAL | Age: 81
LOS: 18 days | End: 2021-09-19
Attending: INTERNAL MEDICINE | Admitting: ORTHOPAEDIC SURGERY
Payer: MEDICAID

## 2021-01-01 VITALS
SYSTOLIC BLOOD PRESSURE: 92 MMHG | WEIGHT: 139.99 LBS | TEMPERATURE: 98 F | HEIGHT: 68 IN | OXYGEN SATURATION: 97 % | HEART RATE: 64 BPM | RESPIRATION RATE: 17 BRPM | DIASTOLIC BLOOD PRESSURE: 59 MMHG

## 2021-01-01 VITALS
HEART RATE: 101 BPM | OXYGEN SATURATION: 100 % | HEIGHT: 61 IN | DIASTOLIC BLOOD PRESSURE: 66 MMHG | BODY MASS INDEX: 26.43 KG/M2 | SYSTOLIC BLOOD PRESSURE: 105 MMHG | TEMPERATURE: 98.1 F | WEIGHT: 140 LBS

## 2021-01-01 VITALS
HEIGHT: 61 IN | DIASTOLIC BLOOD PRESSURE: 84 MMHG | OXYGEN SATURATION: 98 % | BODY MASS INDEX: 26.24 KG/M2 | WEIGHT: 139 LBS | HEART RATE: 80 BPM | SYSTOLIC BLOOD PRESSURE: 148 MMHG | TEMPERATURE: 98.7 F

## 2021-01-01 DIAGNOSIS — I25.810 ATHEROSCLEROSIS OF CORONARY ARTERY BYPASS GRAFT(S) W/OUT ANGINA PECTORIS: ICD-10-CM

## 2021-01-01 DIAGNOSIS — Z86.79 PERSONAL HISTORY OF OTHER DISEASES OF THE CIRCULATORY SYSTEM: ICD-10-CM

## 2021-01-01 DIAGNOSIS — Z86.39 PERSONAL HISTORY OF OTHER ENDOCRINE, NUTRITIONAL AND METABOLIC DISEASE: ICD-10-CM

## 2021-01-01 DIAGNOSIS — B97.89 OTHER VIRAL AGENTS AS THE CAUSE OF DISEASES CLASSIFIED ELSEWHERE: ICD-10-CM

## 2021-01-01 DIAGNOSIS — R41.3 OTHER AMNESIA: ICD-10-CM

## 2021-01-01 DIAGNOSIS — R76.8 OTHER SPECIFIED ABNORMAL IMMUNOLOGICAL FINDINGS IN SERUM: ICD-10-CM

## 2021-01-01 DIAGNOSIS — Z87.891 PERSONAL HISTORY OF NICOTINE DEPENDENCE: ICD-10-CM

## 2021-01-01 DIAGNOSIS — Z83.3 FAMILY HISTORY OF DIABETES MELLITUS: ICD-10-CM

## 2021-01-01 DIAGNOSIS — J96.01 ACUTE RESPIRATORY FAILURE WITH HYPOXIA: ICD-10-CM

## 2021-01-01 DIAGNOSIS — N18.30 CHRONIC KIDNEY DISEASE, STAGE 3 UNSPECIFIED: ICD-10-CM

## 2021-01-01 DIAGNOSIS — Z00.00 ENCOUNTER FOR GENERAL ADULT MEDICAL EXAMINATION W/OUT ABNORMAL FINDINGS: ICD-10-CM

## 2021-01-01 DIAGNOSIS — Z95.1 PRESENCE OF AORTOCORONARY BYPASS GRAFT: Chronic | ICD-10-CM

## 2021-01-01 DIAGNOSIS — S72.001A FRACTURE OF UNSPECIFIED PART OF NECK OF RIGHT FEMUR, INITIAL ENCOUNTER FOR CLOSED FRACTURE: ICD-10-CM

## 2021-01-01 DIAGNOSIS — R68.89 OTHER GENERAL SYMPTOMS AND SIGNS: ICD-10-CM

## 2021-01-01 DIAGNOSIS — I25.810 ATHEROSCLEROSIS OF CORONARY ARTERY BYPASS GRAFT(S) WITHOUT ANGINA PECTORIS: ICD-10-CM

## 2021-01-01 DIAGNOSIS — E87.1 HYPO-OSMOLALITY AND HYPONATREMIA: ICD-10-CM

## 2021-01-01 DIAGNOSIS — Z82.3 FAMILY HISTORY OF STROKE: ICD-10-CM

## 2021-01-01 DIAGNOSIS — Z01.84 ENCOUNTER FOR ANTIBODY RESPONSE EXAMINATION: ICD-10-CM

## 2021-01-01 DIAGNOSIS — Z87.09 PERSONAL HISTORY OF OTHER DISEASES OF THE RESPIRATORY SYSTEM: ICD-10-CM

## 2021-01-01 DIAGNOSIS — Z86.69 PERSONAL HISTORY OF OTHER DISEASES OF THE NERVOUS SYSTEM AND SENSE ORGANS: ICD-10-CM

## 2021-01-01 LAB
-  AMIKACIN: SIGNIFICANT CHANGE UP
-  AMIKACIN: SIGNIFICANT CHANGE UP
-  AZTREONAM: SIGNIFICANT CHANGE UP
-  AZTREONAM: SIGNIFICANT CHANGE UP
-  CEFEPIME: SIGNIFICANT CHANGE UP
-  CEFEPIME: SIGNIFICANT CHANGE UP
-  CEFTAZIDIME: SIGNIFICANT CHANGE UP
-  CEFTAZIDIME: SIGNIFICANT CHANGE UP
-  CIPROFLOXACIN: SIGNIFICANT CHANGE UP
-  CIPROFLOXACIN: SIGNIFICANT CHANGE UP
-  GENTAMICIN: SIGNIFICANT CHANGE UP
-  GENTAMICIN: SIGNIFICANT CHANGE UP
-  IMIPENEM: SIGNIFICANT CHANGE UP
-  IMIPENEM: SIGNIFICANT CHANGE UP
-  LEVOFLOXACIN: SIGNIFICANT CHANGE UP
-  LEVOFLOXACIN: SIGNIFICANT CHANGE UP
-  MEROPENEM: SIGNIFICANT CHANGE UP
-  MEROPENEM: SIGNIFICANT CHANGE UP
-  PIPERACILLIN/TAZOBACTAM: SIGNIFICANT CHANGE UP
-  PIPERACILLIN/TAZOBACTAM: SIGNIFICANT CHANGE UP
-  TOBRAMYCIN: SIGNIFICANT CHANGE UP
-  TOBRAMYCIN: SIGNIFICANT CHANGE UP
A PHAGOCYTOPH IGG TITR SER IF: SIGNIFICANT CHANGE UP TITER
A1C WITH ESTIMATED AVERAGE GLUCOSE RESULT: 5.5 % — SIGNIFICANT CHANGE UP (ref 4–5.6)
ABO RH CONFIRMATION: SIGNIFICANT CHANGE UP
ALBUMIN SERPL ELPH-MCNC: 0.7 G/DL — LOW (ref 3.3–5)
ALBUMIN SERPL ELPH-MCNC: 0.8 G/DL — LOW (ref 3.3–5)
ALBUMIN SERPL ELPH-MCNC: 0.9 G/DL — LOW (ref 3.3–5)
ALBUMIN SERPL ELPH-MCNC: 0.9 G/DL — LOW (ref 3.3–5)
ALBUMIN SERPL ELPH-MCNC: 1 G/DL — LOW (ref 3.3–5)
ALBUMIN SERPL ELPH-MCNC: 1.2 G/DL — LOW (ref 3.3–5)
ALBUMIN SERPL ELPH-MCNC: 1.5 G/DL — LOW (ref 3.3–5)
ALBUMIN SERPL ELPH-MCNC: 1.6 G/DL — LOW (ref 3.3–5)
ALBUMIN SERPL ELPH-MCNC: 1.9 G/DL — LOW (ref 3.3–5)
ALBUMIN SERPL ELPH-MCNC: 1.9 G/DL — LOW (ref 3.3–5)
ALBUMIN SERPL ELPH-MCNC: 2 G/DL — LOW (ref 3.3–5)
ALBUMIN SERPL ELPH-MCNC: 2 G/DL — LOW (ref 3.3–5)
ALBUMIN SERPL ELPH-MCNC: 2.1 G/DL — LOW (ref 3.3–5)
ALBUMIN SERPL ELPH-MCNC: 2.1 G/DL — LOW (ref 3.3–5)
ALBUMIN SERPL ELPH-MCNC: 2.8 G/DL — LOW (ref 3.3–5)
ALBUMIN SERPL ELPH-MCNC: 4 G/DL — SIGNIFICANT CHANGE UP (ref 3.3–5)
ALBUMIN SERPL ELPH-MCNC: 4.7 G/DL — SIGNIFICANT CHANGE UP (ref 3.3–5)
ALP SERPL-CCNC: 102 U/L — SIGNIFICANT CHANGE UP (ref 40–120)
ALP SERPL-CCNC: 1033 U/L — HIGH (ref 40–120)
ALP SERPL-CCNC: 1085 U/L — HIGH (ref 40–120)
ALP SERPL-CCNC: 130 U/L — HIGH (ref 40–120)
ALP SERPL-CCNC: 285 U/L — HIGH (ref 40–120)
ALP SERPL-CCNC: 31 U/L — LOW (ref 40–120)
ALP SERPL-CCNC: 44 U/L — SIGNIFICANT CHANGE UP (ref 40–120)
ALP SERPL-CCNC: 46 U/L — SIGNIFICANT CHANGE UP (ref 40–120)
ALP SERPL-CCNC: 46 U/L — SIGNIFICANT CHANGE UP (ref 40–120)
ALP SERPL-CCNC: 53 U/L — SIGNIFICANT CHANGE UP (ref 40–120)
ALP SERPL-CCNC: 590 U/L — HIGH (ref 40–120)
ALP SERPL-CCNC: 80 U/L — SIGNIFICANT CHANGE UP (ref 40–120)
ALP SERPL-CCNC: 808 U/L — HIGH (ref 40–120)
ALP SERPL-CCNC: 820 U/L — HIGH (ref 40–120)
ALP SERPL-CCNC: 84 U/L — SIGNIFICANT CHANGE UP (ref 40–120)
ALP SERPL-CCNC: 94 U/L — SIGNIFICANT CHANGE UP (ref 40–120)
ALP SERPL-CCNC: 952 U/L — HIGH (ref 40–120)
ALP SERPL-CCNC: 963 U/L — HIGH (ref 40–120)
ALP SERPL-CCNC: 971 U/L — HIGH (ref 40–120)
ALP SERPL-CCNC: 989 U/L — HIGH (ref 40–120)
ALP SERPL-CCNC: 996 U/L — HIGH (ref 40–120)
ALP SERPL-CCNC: 996 U/L — HIGH (ref 40–120)
ALT FLD-CCNC: 105 U/L — HIGH (ref 12–78)
ALT FLD-CCNC: 11 U/L — LOW (ref 12–78)
ALT FLD-CCNC: 116 U/L — HIGH (ref 12–78)
ALT FLD-CCNC: 128 U/L — HIGH (ref 12–78)
ALT FLD-CCNC: 134 U/L — HIGH (ref 12–78)
ALT FLD-CCNC: 139 U/L — HIGH (ref 12–78)
ALT FLD-CCNC: 149 U/L — HIGH (ref 12–78)
ALT FLD-CCNC: 151 U/L — HIGH (ref 12–78)
ALT FLD-CCNC: 22 U/L — SIGNIFICANT CHANGE UP (ref 12–78)
ALT FLD-CCNC: 229 U/L — HIGH (ref 12–78)
ALT FLD-CCNC: 26 U/L — SIGNIFICANT CHANGE UP (ref 10–45)
ALT FLD-CCNC: 28 U/L — SIGNIFICANT CHANGE UP (ref 10–45)
ALT FLD-CCNC: 335 U/L — HIGH (ref 12–78)
ALT FLD-CCNC: 457 U/L — HIGH (ref 12–78)
ALT FLD-CCNC: 48 U/L — SIGNIFICANT CHANGE UP (ref 12–78)
ALT FLD-CCNC: 62 U/L — SIGNIFICANT CHANGE UP (ref 12–78)
ALT FLD-CCNC: 73 U/L — SIGNIFICANT CHANGE UP (ref 12–78)
ALT FLD-CCNC: 79 U/L — HIGH (ref 12–78)
ALT FLD-CCNC: 80 U/L — HIGH (ref 12–78)
ALT FLD-CCNC: 87 U/L — HIGH (ref 12–78)
ALT FLD-CCNC: 95 U/L — HIGH (ref 12–78)
ALT FLD-CCNC: 97 U/L — HIGH (ref 12–78)
ANION GAP SERPL CALC-SCNC: 10 MMOL/L — SIGNIFICANT CHANGE UP (ref 5–17)
ANION GAP SERPL CALC-SCNC: 10 MMOL/L — SIGNIFICANT CHANGE UP (ref 5–17)
ANION GAP SERPL CALC-SCNC: 11 MMOL/L — SIGNIFICANT CHANGE UP (ref 5–17)
ANION GAP SERPL CALC-SCNC: 12 MMOL/L — SIGNIFICANT CHANGE UP (ref 5–17)
ANION GAP SERPL CALC-SCNC: 13 MMOL/L — SIGNIFICANT CHANGE UP (ref 5–17)
ANION GAP SERPL CALC-SCNC: 13 MMOL/L — SIGNIFICANT CHANGE UP (ref 5–17)
ANION GAP SERPL CALC-SCNC: 14 MMOL/L — SIGNIFICANT CHANGE UP (ref 5–17)
ANION GAP SERPL CALC-SCNC: 15 MMOL/L — SIGNIFICANT CHANGE UP (ref 5–17)
ANION GAP SERPL CALC-SCNC: 15 MMOL/L — SIGNIFICANT CHANGE UP (ref 5–17)
ANION GAP SERPL CALC-SCNC: 5 MMOL/L — SIGNIFICANT CHANGE UP (ref 5–17)
ANION GAP SERPL CALC-SCNC: 6 MMOL/L — SIGNIFICANT CHANGE UP (ref 5–17)
ANION GAP SERPL CALC-SCNC: 6 MMOL/L — SIGNIFICANT CHANGE UP (ref 5–17)
ANION GAP SERPL CALC-SCNC: 7 MMOL/L — SIGNIFICANT CHANGE UP (ref 5–17)
ANION GAP SERPL CALC-SCNC: 8 MMOL/L — SIGNIFICANT CHANGE UP (ref 5–17)
ANION GAP SERPL CALC-SCNC: 9 MMOL/L — SIGNIFICANT CHANGE UP (ref 5–17)
ANISOCYTOSIS BLD QL: SLIGHT — SIGNIFICANT CHANGE UP
APPEARANCE UR: CLEAR — SIGNIFICANT CHANGE UP
APPEARANCE UR: CLEAR — SIGNIFICANT CHANGE UP
APTT BLD: 27.5 SEC — SIGNIFICANT CHANGE UP (ref 27.5–35.5)
APTT BLD: 31 SEC — SIGNIFICANT CHANGE UP (ref 27.5–35.5)
APTT BLD: 31.1 SEC — SIGNIFICANT CHANGE UP (ref 27.5–35.5)
APTT BLD: 31.7 SEC — SIGNIFICANT CHANGE UP (ref 27.5–35.5)
APTT BLD: 33.2 SEC — SIGNIFICANT CHANGE UP (ref 27.5–35.5)
AST SERPL-CCNC: 1028 U/L — HIGH (ref 15–37)
AST SERPL-CCNC: 110 U/L — HIGH (ref 15–37)
AST SERPL-CCNC: 1176 U/L — HIGH (ref 15–37)
AST SERPL-CCNC: 121 U/L — HIGH (ref 15–37)
AST SERPL-CCNC: 126 U/L — HIGH (ref 15–37)
AST SERPL-CCNC: 14 U/L — LOW (ref 15–37)
AST SERPL-CCNC: 144 U/L — HIGH (ref 15–37)
AST SERPL-CCNC: 147 U/L — HIGH (ref 15–37)
AST SERPL-CCNC: 183 U/L — HIGH (ref 15–37)
AST SERPL-CCNC: 195 U/L — HIGH (ref 15–37)
AST SERPL-CCNC: 203 U/L — HIGH (ref 15–37)
AST SERPL-CCNC: 230 U/L — HIGH (ref 15–37)
AST SERPL-CCNC: 230 U/L — HIGH (ref 15–37)
AST SERPL-CCNC: 242 U/L — HIGH (ref 15–37)
AST SERPL-CCNC: 279 U/L — HIGH (ref 15–37)
AST SERPL-CCNC: 28 U/L — SIGNIFICANT CHANGE UP (ref 15–37)
AST SERPL-CCNC: 35 U/L — SIGNIFICANT CHANGE UP (ref 10–40)
AST SERPL-CCNC: 36 U/L — SIGNIFICANT CHANGE UP (ref 10–40)
AST SERPL-CCNC: 403 U/L — HIGH (ref 15–37)
AST SERPL-CCNC: 448 U/L — HIGH (ref 15–37)
AST SERPL-CCNC: 512 U/L — HIGH (ref 15–37)
AST SERPL-CCNC: 779 U/L — HIGH (ref 15–37)
AUTO DIFF PNL BLD: NEGATIVE — SIGNIFICANT CHANGE UP
B BURGDOR AB SER QL IA: NEGATIVE — SIGNIFICANT CHANGE UP
B MICROTI IGG TITR SER: SIGNIFICANT CHANGE UP TITER
BACTERIA # UR AUTO: ABNORMAL
BACTERIA # UR AUTO: NEGATIVE — SIGNIFICANT CHANGE UP
BASE EXCESS BLDA CALC-SCNC: -1.3 MMOL/L — SIGNIFICANT CHANGE UP (ref -2–2)
BASE EXCESS BLDA CALC-SCNC: -12 MMOL/L — LOW (ref -2–2)
BASE EXCESS BLDA CALC-SCNC: -14.6 MMOL/L — LOW (ref -2–2)
BASE EXCESS BLDA CALC-SCNC: -16.3 MMOL/L — LOW (ref -2–2)
BASE EXCESS BLDA CALC-SCNC: -19.2 MMOL/L — LOW (ref -2–2)
BASE EXCESS BLDA CALC-SCNC: -2.7 MMOL/L — LOW (ref -2–2)
BASE EXCESS BLDA CALC-SCNC: -3.4 MMOL/L — LOW (ref -2–2)
BASE EXCESS BLDA CALC-SCNC: -4 MMOL/L — LOW (ref -2–2)
BASE EXCESS BLDA CALC-SCNC: -4.9 MMOL/L — LOW (ref -2–2)
BASE EXCESS BLDV CALC-SCNC: -8 MMOL/L — LOW (ref -2–2)
BASE EXCESS BLDV CALC-SCNC: -9.1 MMOL/L — LOW (ref -2–2)
BASOPHILS # BLD AUTO: 0 K/UL — SIGNIFICANT CHANGE UP (ref 0–0.2)
BASOPHILS # BLD AUTO: 0 K/UL — SIGNIFICANT CHANGE UP (ref 0–0.2)
BASOPHILS # BLD AUTO: 0.02 K/UL — SIGNIFICANT CHANGE UP (ref 0–0.2)
BASOPHILS # BLD AUTO: 0.03 K/UL — SIGNIFICANT CHANGE UP (ref 0–0.2)
BASOPHILS # BLD AUTO: 0.1 K/UL — SIGNIFICANT CHANGE UP (ref 0–0.2)
BASOPHILS # BLD AUTO: 0.11 K/UL — SIGNIFICANT CHANGE UP (ref 0–0.2)
BASOPHILS # BLD AUTO: 0.12 K/UL — SIGNIFICANT CHANGE UP (ref 0–0.2)
BASOPHILS # BLD AUTO: 0.14 K/UL — SIGNIFICANT CHANGE UP (ref 0–0.2)
BASOPHILS NFR BLD AUTO: 0 % — SIGNIFICANT CHANGE UP (ref 0–2)
BASOPHILS NFR BLD AUTO: 0 % — SIGNIFICANT CHANGE UP (ref 0–2)
BASOPHILS NFR BLD AUTO: 0.1 % — SIGNIFICANT CHANGE UP (ref 0–2)
BASOPHILS NFR BLD AUTO: 0.1 % — SIGNIFICANT CHANGE UP (ref 0–2)
BASOPHILS NFR BLD AUTO: 0.5 % — SIGNIFICANT CHANGE UP (ref 0–2)
BASOPHILS NFR BLD AUTO: 0.5 % — SIGNIFICANT CHANGE UP (ref 0–2)
BASOPHILS NFR BLD AUTO: 0.6 % — SIGNIFICANT CHANGE UP (ref 0–2)
BASOPHILS NFR BLD AUTO: 1 % — SIGNIFICANT CHANGE UP (ref 0–2)
BILIRUB SERPL-MCNC: 0.2 MG/DL — SIGNIFICANT CHANGE UP (ref 0.2–1.2)
BILIRUB SERPL-MCNC: 0.3 MG/DL — SIGNIFICANT CHANGE UP (ref 0.2–1.2)
BILIRUB SERPL-MCNC: 0.3 MG/DL — SIGNIFICANT CHANGE UP (ref 0.2–1.2)
BILIRUB SERPL-MCNC: 0.4 MG/DL — SIGNIFICANT CHANGE UP (ref 0.2–1.2)
BILIRUB SERPL-MCNC: 0.9 MG/DL — SIGNIFICANT CHANGE UP (ref 0.2–1.2)
BILIRUB SERPL-MCNC: 1.1 MG/DL — SIGNIFICANT CHANGE UP (ref 0.2–1.2)
BILIRUB SERPL-MCNC: 1.3 MG/DL — HIGH (ref 0.2–1.2)
BILIRUB SERPL-MCNC: 1.4 MG/DL — HIGH (ref 0.2–1.2)
BILIRUB SERPL-MCNC: 1.6 MG/DL — HIGH (ref 0.2–1.2)
BILIRUB SERPL-MCNC: 1.7 MG/DL — HIGH (ref 0.2–1.2)
BILIRUB SERPL-MCNC: 11.3 MG/DL — HIGH (ref 0.2–1.2)
BILIRUB SERPL-MCNC: 4.4 MG/DL — HIGH (ref 0.2–1.2)
BILIRUB SERPL-MCNC: 4.5 MG/DL — HIGH (ref 0.2–1.2)
BILIRUB SERPL-MCNC: 4.7 MG/DL — HIGH (ref 0.2–1.2)
BILIRUB SERPL-MCNC: 4.7 MG/DL — HIGH (ref 0.2–1.2)
BILIRUB SERPL-MCNC: 5.1 MG/DL — HIGH (ref 0.2–1.2)
BILIRUB SERPL-MCNC: 5.4 MG/DL — HIGH (ref 0.2–1.2)
BILIRUB SERPL-MCNC: 5.4 MG/DL — HIGH (ref 0.2–1.2)
BILIRUB SERPL-MCNC: 5.9 MG/DL — HIGH (ref 0.2–1.2)
BILIRUB SERPL-MCNC: 6.9 MG/DL — HIGH (ref 0.2–1.2)
BILIRUB SERPL-MCNC: 9.2 MG/DL — HIGH (ref 0.2–1.2)
BILIRUB SERPL-MCNC: 9.7 MG/DL — HIGH (ref 0.2–1.2)
BILIRUB UR-MCNC: NEGATIVE — SIGNIFICANT CHANGE UP
BILIRUB UR-MCNC: NEGATIVE — SIGNIFICANT CHANGE UP
BLD GP AB SCN SERPL QL: SIGNIFICANT CHANGE UP
BLOOD GAS COMMENTS, VENOUS: SIGNIFICANT CHANGE UP
BLOOD GAS COMMENTS, VENOUS: SIGNIFICANT CHANGE UP
BLOOD GAS COMMENTS: SIGNIFICANT CHANGE UP
BLOOD GAS SOURCE: SIGNIFICANT CHANGE UP
BUN SERPL-MCNC: 110 MG/DL — HIGH (ref 7–23)
BUN SERPL-MCNC: 110 MG/DL — HIGH (ref 7–23)
BUN SERPL-MCNC: 122 MG/DL — HIGH (ref 7–23)
BUN SERPL-MCNC: 145 MG/DL — HIGH (ref 7–23)
BUN SERPL-MCNC: 159 MG/DL — HIGH (ref 7–23)
BUN SERPL-MCNC: 35 MG/DL — HIGH (ref 7–23)
BUN SERPL-MCNC: 50 MG/DL — HIGH (ref 7–23)
BUN SERPL-MCNC: 52 MG/DL — HIGH (ref 7–23)
BUN SERPL-MCNC: 52 MG/DL — HIGH (ref 7–23)
BUN SERPL-MCNC: 53 MG/DL — HIGH (ref 7–23)
BUN SERPL-MCNC: 56 MG/DL — HIGH (ref 7–23)
BUN SERPL-MCNC: 57 MG/DL — HIGH (ref 7–23)
BUN SERPL-MCNC: 57 MG/DL — HIGH (ref 7–23)
BUN SERPL-MCNC: 61 MG/DL — HIGH (ref 7–23)
BUN SERPL-MCNC: 61 MG/DL — HIGH (ref 7–23)
BUN SERPL-MCNC: 66 MG/DL — HIGH (ref 7–23)
BUN SERPL-MCNC: 70 MG/DL — HIGH (ref 7–23)
BUN SERPL-MCNC: 71 MG/DL — HIGH (ref 7–23)
BUN SERPL-MCNC: 79 MG/DL — HIGH (ref 7–23)
BUN SERPL-MCNC: 80 MG/DL — HIGH (ref 7–23)
BUN SERPL-MCNC: 80 MG/DL — HIGH (ref 7–23)
BUN SERPL-MCNC: 89 MG/DL — HIGH (ref 7–23)
BUN SERPL-MCNC: 91 MG/DL — HIGH (ref 7–23)
BUN SERPL-MCNC: 94 MG/DL — HIGH (ref 7–23)
BURR CELLS BLD QL SMEAR: SLIGHT — SIGNIFICANT CHANGE UP
C-ANCA SER-ACNC: NEGATIVE — SIGNIFICANT CHANGE UP
CALCIUM SERPL-MCNC: 6.2 MG/DL — CRITICAL LOW (ref 8.5–10.1)
CALCIUM SERPL-MCNC: 6.7 MG/DL — LOW (ref 8.5–10.1)
CALCIUM SERPL-MCNC: 7.1 MG/DL — LOW (ref 8.5–10.1)
CALCIUM SERPL-MCNC: 7.5 MG/DL — LOW (ref 8.5–10.1)
CALCIUM SERPL-MCNC: 7.6 MG/DL — LOW (ref 8.5–10.1)
CALCIUM SERPL-MCNC: 7.6 MG/DL — LOW (ref 8.5–10.1)
CALCIUM SERPL-MCNC: 7.7 MG/DL — LOW (ref 8.5–10.1)
CALCIUM SERPL-MCNC: 7.8 MG/DL — LOW (ref 8.5–10.1)
CALCIUM SERPL-MCNC: 7.9 MG/DL — LOW (ref 8.5–10.1)
CALCIUM SERPL-MCNC: 8 MG/DL — LOW (ref 8.5–10.1)
CALCIUM SERPL-MCNC: 8.1 MG/DL — LOW (ref 8.5–10.1)
CALCIUM SERPL-MCNC: 8.2 MG/DL — LOW (ref 8.5–10.1)
CALCIUM SERPL-MCNC: 8.3 MG/DL — LOW (ref 8.5–10.1)
CALCIUM SERPL-MCNC: 8.3 MG/DL — LOW (ref 8.5–10.1)
CALCIUM SERPL-MCNC: 8.7 MG/DL — SIGNIFICANT CHANGE UP (ref 8.5–10.1)
CALCIUM SERPL-MCNC: 9 MG/DL — SIGNIFICANT CHANGE UP (ref 8.4–10.5)
CALCIUM SERPL-MCNC: 9.4 MG/DL — SIGNIFICANT CHANGE UP (ref 8.4–10.5)
CHLORIDE SERPL-SCNC: 100 MMOL/L — SIGNIFICANT CHANGE UP (ref 96–108)
CHLORIDE SERPL-SCNC: 102 MMOL/L — SIGNIFICANT CHANGE UP (ref 96–108)
CHLORIDE SERPL-SCNC: 103 MMOL/L — SIGNIFICANT CHANGE UP (ref 96–108)
CHLORIDE SERPL-SCNC: 104 MMOL/L — SIGNIFICANT CHANGE UP (ref 96–108)
CHLORIDE SERPL-SCNC: 105 MMOL/L — SIGNIFICANT CHANGE UP (ref 96–108)
CHLORIDE SERPL-SCNC: 106 MMOL/L — SIGNIFICANT CHANGE UP (ref 96–108)
CHLORIDE SERPL-SCNC: 106 MMOL/L — SIGNIFICANT CHANGE UP (ref 96–108)
CHLORIDE SERPL-SCNC: 107 MMOL/L — SIGNIFICANT CHANGE UP (ref 96–108)
CHLORIDE SERPL-SCNC: 109 MMOL/L — HIGH (ref 96–108)
CHLORIDE SERPL-SCNC: 112 MMOL/L — HIGH (ref 96–108)
CHLORIDE SERPL-SCNC: 112 MMOL/L — HIGH (ref 96–108)
CHLORIDE SERPL-SCNC: 113 MMOL/L — HIGH (ref 96–108)
CHLORIDE SERPL-SCNC: 113 MMOL/L — HIGH (ref 96–108)
CHLORIDE SERPL-SCNC: 115 MMOL/L — HIGH (ref 96–108)
CHLORIDE SERPL-SCNC: 93 MMOL/L — LOW (ref 96–108)
CHLORIDE SERPL-SCNC: 97 MMOL/L — SIGNIFICANT CHANGE UP (ref 96–108)
CHLORIDE SERPL-SCNC: 98 MMOL/L — SIGNIFICANT CHANGE UP (ref 96–108)
CHOLEST SERPL-MCNC: 136 MG/DL — SIGNIFICANT CHANGE UP
CK MB BLD-MCNC: 0.6 % — SIGNIFICANT CHANGE UP (ref 0–3.5)
CK MB BLD-MCNC: 1.2 % — SIGNIFICANT CHANGE UP (ref 0–3.5)
CK MB CFR SERPL CALC: 12 NG/ML — HIGH (ref 0.5–3.6)
CK MB CFR SERPL CALC: 16.5 NG/ML — HIGH (ref 0.5–3.6)
CK SERPL-CCNC: 1030 U/L — HIGH (ref 26–308)
CK SERPL-CCNC: 2756 U/L — HIGH (ref 26–308)
CO2 SERPL-SCNC: 10 MMOL/L — CRITICAL LOW (ref 22–31)
CO2 SERPL-SCNC: 14 MMOL/L — LOW (ref 22–31)
CO2 SERPL-SCNC: 17 MMOL/L — LOW (ref 22–31)
CO2 SERPL-SCNC: 19 MMOL/L — LOW (ref 22–31)
CO2 SERPL-SCNC: 20 MMOL/L — LOW (ref 22–31)
CO2 SERPL-SCNC: 21 MMOL/L — LOW (ref 22–31)
CO2 SERPL-SCNC: 22 MMOL/L — SIGNIFICANT CHANGE UP (ref 22–31)
CO2 SERPL-SCNC: 23 MMOL/L — SIGNIFICANT CHANGE UP (ref 22–31)
CO2 SERPL-SCNC: 24 MMOL/L — SIGNIFICANT CHANGE UP (ref 22–31)
CO2 SERPL-SCNC: 25 MMOL/L — SIGNIFICANT CHANGE UP (ref 22–31)
CO2 SERPL-SCNC: 25 MMOL/L — SIGNIFICANT CHANGE UP (ref 22–31)
CO2 SERPL-SCNC: 27 MMOL/L — SIGNIFICANT CHANGE UP (ref 22–31)
COLOR SPEC: SIGNIFICANT CHANGE UP
COLOR SPEC: YELLOW — SIGNIFICANT CHANGE UP
COMMENT - URINE: SIGNIFICANT CHANGE UP
COVID-19 NUCLEOCAPSID GAM AB INTERP: POSITIVE
COVID-19 NUCLEOCAPSID TOTAL GAM ANTIBODY RESULT: 28.6 INDEX — HIGH
COVID-19 SPIKE DOMAIN AB INTERP: POSITIVE
COVID-19 SPIKE DOMAIN AB INTERP: POSITIVE
COVID-19 SPIKE DOMAIN ANTIBODY RESULT: >250 U/ML — HIGH
COVID-19 SPIKE DOMAIN ANTIBODY RESULT: >250 U/ML — HIGH
CREAT SERPL-MCNC: 1.68 MG/DL — HIGH (ref 0.5–1.3)
CREAT SERPL-MCNC: 2.44 MG/DL — HIGH (ref 0.5–1.3)
CREAT SERPL-MCNC: 2.56 MG/DL — HIGH (ref 0.5–1.3)
CREAT SERPL-MCNC: 2.68 MG/DL — HIGH (ref 0.5–1.3)
CREAT SERPL-MCNC: 2.7 MG/DL — HIGH (ref 0.5–1.3)
CREAT SERPL-MCNC: 2.78 MG/DL — HIGH (ref 0.5–1.3)
CREAT SERPL-MCNC: 2.85 MG/DL — HIGH (ref 0.5–1.3)
CREAT SERPL-MCNC: 2.87 MG/DL — HIGH (ref 0.5–1.3)
CREAT SERPL-MCNC: 2.94 MG/DL — HIGH (ref 0.5–1.3)
CREAT SERPL-MCNC: 2.99 MG/DL — HIGH (ref 0.5–1.3)
CREAT SERPL-MCNC: 3.17 MG/DL — HIGH (ref 0.5–1.3)
CREAT SERPL-MCNC: 3.21 MG/DL — HIGH (ref 0.5–1.3)
CREAT SERPL-MCNC: 3.23 MG/DL — HIGH (ref 0.5–1.3)
CREAT SERPL-MCNC: 3.28 MG/DL — HIGH (ref 0.5–1.3)
CREAT SERPL-MCNC: 3.29 MG/DL — HIGH (ref 0.5–1.3)
CREAT SERPL-MCNC: 3.32 MG/DL — HIGH (ref 0.5–1.3)
CREAT SERPL-MCNC: 3.38 MG/DL — HIGH (ref 0.5–1.3)
CREAT SERPL-MCNC: 3.44 MG/DL — HIGH (ref 0.5–1.3)
CREAT SERPL-MCNC: 3.49 MG/DL — HIGH (ref 0.5–1.3)
CREAT SERPL-MCNC: 3.55 MG/DL — HIGH (ref 0.5–1.3)
CREAT SERPL-MCNC: 3.6 MG/DL — HIGH (ref 0.5–1.3)
CREAT SERPL-MCNC: 3.71 MG/DL — HIGH (ref 0.5–1.3)
CREAT SERPL-MCNC: 3.71 MG/DL — HIGH (ref 0.5–1.3)
CREAT SERPL-MCNC: 4.11 MG/DL — HIGH (ref 0.5–1.3)
CREAT SERPL-MCNC: 4.37 MG/DL — HIGH (ref 0.5–1.3)
CREAT SERPL-MCNC: 4.46 MG/DL — HIGH (ref 0.5–1.3)
CULTURE RESULTS: NO GROWTH — SIGNIFICANT CHANGE UP
CULTURE RESULTS: SIGNIFICANT CHANGE UP
D DIMER BLD IA.RAPID-MCNC: 5544 NG/ML DDU — HIGH
DIFF PNL FLD: ABNORMAL
DIFF PNL FLD: NEGATIVE — SIGNIFICANT CHANGE UP
E CHAFFEENSIS IGG TITR SER IF: SIGNIFICANT CHANGE UP TITER
EOSINOPHIL # BLD AUTO: 0 K/UL — SIGNIFICANT CHANGE UP (ref 0–0.5)
EOSINOPHIL # BLD AUTO: 0.01 K/UL — SIGNIFICANT CHANGE UP (ref 0–0.5)
EOSINOPHIL # BLD AUTO: 0.01 K/UL — SIGNIFICANT CHANGE UP (ref 0–0.5)
EOSINOPHIL # BLD AUTO: 0.02 K/UL — SIGNIFICANT CHANGE UP (ref 0–0.5)
EOSINOPHIL # BLD AUTO: 0.52 K/UL — HIGH (ref 0–0.5)
EOSINOPHIL NFR BLD AUTO: 0 % — SIGNIFICANT CHANGE UP (ref 0–6)
EOSINOPHIL NFR BLD AUTO: 0.1 % — SIGNIFICANT CHANGE UP (ref 0–6)
EOSINOPHIL NFR BLD AUTO: 5 % — SIGNIFICANT CHANGE UP (ref 0–6)
EPI CELLS # UR: 0 /HPF — SIGNIFICANT CHANGE UP (ref 0–5)
EPI CELLS # UR: SIGNIFICANT CHANGE UP
ESTIMATED AVERAGE GLUCOSE: 111 MG/DL — SIGNIFICANT CHANGE UP (ref 68–114)
FIBRINOGEN PPP-MCNC: 641 MG/DL — HIGH (ref 290–520)
FLUAV AG NPH QL: SIGNIFICANT CHANGE UP
FLUBV AG NPH QL: SIGNIFICANT CHANGE UP
FOLATE SERPL-MCNC: 15 NG/ML — SIGNIFICANT CHANGE UP
GAMMA INTERFERON BACKGROUND BLD IA-ACNC: 0.06 IU/ML — SIGNIFICANT CHANGE UP
GAS PNL BLDV: SIGNIFICANT CHANGE UP
GAS PNL BLDV: SIGNIFICANT CHANGE UP
GBM IGG SER-ACNC: 3 — SIGNIFICANT CHANGE UP (ref 0–20)
GLUCOSE BLDC GLUCOMTR-MCNC: 103 MG/DL — HIGH (ref 70–99)
GLUCOSE BLDC GLUCOMTR-MCNC: 104 MG/DL — HIGH (ref 70–99)
GLUCOSE BLDC GLUCOMTR-MCNC: 104 MG/DL — HIGH (ref 70–99)
GLUCOSE BLDC GLUCOMTR-MCNC: 106 MG/DL — HIGH (ref 70–99)
GLUCOSE BLDC GLUCOMTR-MCNC: 112 MG/DL — HIGH (ref 70–99)
GLUCOSE BLDC GLUCOMTR-MCNC: 113 MG/DL — HIGH (ref 70–99)
GLUCOSE BLDC GLUCOMTR-MCNC: 114 MG/DL — HIGH (ref 70–99)
GLUCOSE BLDC GLUCOMTR-MCNC: 118 MG/DL — HIGH (ref 70–99)
GLUCOSE BLDC GLUCOMTR-MCNC: 119 MG/DL — HIGH (ref 70–99)
GLUCOSE BLDC GLUCOMTR-MCNC: 121 MG/DL — HIGH (ref 70–99)
GLUCOSE BLDC GLUCOMTR-MCNC: 123 MG/DL — HIGH (ref 70–99)
GLUCOSE BLDC GLUCOMTR-MCNC: 124 MG/DL — HIGH (ref 70–99)
GLUCOSE BLDC GLUCOMTR-MCNC: 125 MG/DL — HIGH (ref 70–99)
GLUCOSE BLDC GLUCOMTR-MCNC: 125 MG/DL — HIGH (ref 70–99)
GLUCOSE BLDC GLUCOMTR-MCNC: 127 MG/DL — HIGH (ref 70–99)
GLUCOSE BLDC GLUCOMTR-MCNC: 128 MG/DL — HIGH (ref 70–99)
GLUCOSE BLDC GLUCOMTR-MCNC: 129 MG/DL — HIGH (ref 70–99)
GLUCOSE BLDC GLUCOMTR-MCNC: 129 MG/DL — HIGH (ref 70–99)
GLUCOSE BLDC GLUCOMTR-MCNC: 130 MG/DL — HIGH (ref 70–99)
GLUCOSE BLDC GLUCOMTR-MCNC: 132 MG/DL — HIGH (ref 70–99)
GLUCOSE BLDC GLUCOMTR-MCNC: 132 MG/DL — HIGH (ref 70–99)
GLUCOSE BLDC GLUCOMTR-MCNC: 134 MG/DL — HIGH (ref 70–99)
GLUCOSE BLDC GLUCOMTR-MCNC: 134 MG/DL — HIGH (ref 70–99)
GLUCOSE BLDC GLUCOMTR-MCNC: 138 MG/DL — HIGH (ref 70–99)
GLUCOSE BLDC GLUCOMTR-MCNC: 139 MG/DL — HIGH (ref 70–99)
GLUCOSE BLDC GLUCOMTR-MCNC: 141 MG/DL — HIGH (ref 70–99)
GLUCOSE BLDC GLUCOMTR-MCNC: 142 MG/DL — HIGH (ref 70–99)
GLUCOSE BLDC GLUCOMTR-MCNC: 144 MG/DL — HIGH (ref 70–99)
GLUCOSE BLDC GLUCOMTR-MCNC: 146 MG/DL — HIGH (ref 70–99)
GLUCOSE BLDC GLUCOMTR-MCNC: 149 MG/DL — HIGH (ref 70–99)
GLUCOSE BLDC GLUCOMTR-MCNC: 155 MG/DL — HIGH (ref 70–99)
GLUCOSE BLDC GLUCOMTR-MCNC: 156 MG/DL — HIGH (ref 70–99)
GLUCOSE BLDC GLUCOMTR-MCNC: 164 MG/DL — HIGH (ref 70–99)
GLUCOSE BLDC GLUCOMTR-MCNC: 166 MG/DL — HIGH (ref 70–99)
GLUCOSE BLDC GLUCOMTR-MCNC: 166 MG/DL — HIGH (ref 70–99)
GLUCOSE BLDC GLUCOMTR-MCNC: 168 MG/DL — HIGH (ref 70–99)
GLUCOSE BLDC GLUCOMTR-MCNC: 169 MG/DL — HIGH (ref 70–99)
GLUCOSE BLDC GLUCOMTR-MCNC: 170 MG/DL — HIGH (ref 70–99)
GLUCOSE BLDC GLUCOMTR-MCNC: 171 MG/DL — HIGH (ref 70–99)
GLUCOSE BLDC GLUCOMTR-MCNC: 172 MG/DL — HIGH (ref 70–99)
GLUCOSE BLDC GLUCOMTR-MCNC: 174 MG/DL — HIGH (ref 70–99)
GLUCOSE BLDC GLUCOMTR-MCNC: 175 MG/DL — HIGH (ref 70–99)
GLUCOSE BLDC GLUCOMTR-MCNC: 175 MG/DL — HIGH (ref 70–99)
GLUCOSE BLDC GLUCOMTR-MCNC: 178 MG/DL — HIGH (ref 70–99)
GLUCOSE BLDC GLUCOMTR-MCNC: 179 MG/DL — HIGH (ref 70–99)
GLUCOSE BLDC GLUCOMTR-MCNC: 185 MG/DL — HIGH (ref 70–99)
GLUCOSE BLDC GLUCOMTR-MCNC: 189 MG/DL — HIGH (ref 70–99)
GLUCOSE BLDC GLUCOMTR-MCNC: 189 MG/DL — HIGH (ref 70–99)
GLUCOSE BLDC GLUCOMTR-MCNC: 198 MG/DL — HIGH (ref 70–99)
GLUCOSE BLDC GLUCOMTR-MCNC: 202 MG/DL — HIGH (ref 70–99)
GLUCOSE BLDC GLUCOMTR-MCNC: 211 MG/DL — HIGH (ref 70–99)
GLUCOSE BLDC GLUCOMTR-MCNC: 222 MG/DL — HIGH (ref 70–99)
GLUCOSE BLDC GLUCOMTR-MCNC: 252 MG/DL — HIGH (ref 70–99)
GLUCOSE BLDC GLUCOMTR-MCNC: 270 MG/DL — HIGH (ref 70–99)
GLUCOSE BLDC GLUCOMTR-MCNC: 53 MG/DL — CRITICAL LOW (ref 70–99)
GLUCOSE BLDC GLUCOMTR-MCNC: 62 MG/DL — LOW (ref 70–99)
GLUCOSE BLDC GLUCOMTR-MCNC: 64 MG/DL — LOW (ref 70–99)
GLUCOSE BLDC GLUCOMTR-MCNC: 68 MG/DL — LOW (ref 70–99)
GLUCOSE BLDC GLUCOMTR-MCNC: 76 MG/DL — SIGNIFICANT CHANGE UP (ref 70–99)
GLUCOSE BLDC GLUCOMTR-MCNC: 83 MG/DL — SIGNIFICANT CHANGE UP (ref 70–99)
GLUCOSE BLDC GLUCOMTR-MCNC: 88 MG/DL — SIGNIFICANT CHANGE UP (ref 70–99)
GLUCOSE BLDC GLUCOMTR-MCNC: 88 MG/DL — SIGNIFICANT CHANGE UP (ref 70–99)
GLUCOSE BLDC GLUCOMTR-MCNC: 93 MG/DL — SIGNIFICANT CHANGE UP (ref 70–99)
GLUCOSE BLDC GLUCOMTR-MCNC: 94 MG/DL — SIGNIFICANT CHANGE UP (ref 70–99)
GLUCOSE BLDC GLUCOMTR-MCNC: 96 MG/DL — SIGNIFICANT CHANGE UP (ref 70–99)
GLUCOSE BLDC GLUCOMTR-MCNC: 98 MG/DL — SIGNIFICANT CHANGE UP (ref 70–99)
GLUCOSE BLDC GLUCOMTR-MCNC: 99 MG/DL — SIGNIFICANT CHANGE UP (ref 70–99)
GLUCOSE SERPL-MCNC: 102 MG/DL — HIGH (ref 70–99)
GLUCOSE SERPL-MCNC: 106 MG/DL — HIGH (ref 70–99)
GLUCOSE SERPL-MCNC: 107 MG/DL — HIGH (ref 70–99)
GLUCOSE SERPL-MCNC: 111 MG/DL — HIGH (ref 70–99)
GLUCOSE SERPL-MCNC: 114 MG/DL — HIGH (ref 70–99)
GLUCOSE SERPL-MCNC: 116 MG/DL — HIGH (ref 70–99)
GLUCOSE SERPL-MCNC: 117 MG/DL — HIGH (ref 70–99)
GLUCOSE SERPL-MCNC: 119 MG/DL — HIGH (ref 70–99)
GLUCOSE SERPL-MCNC: 121 MG/DL — HIGH (ref 70–99)
GLUCOSE SERPL-MCNC: 123 MG/DL — HIGH (ref 70–99)
GLUCOSE SERPL-MCNC: 126 MG/DL — HIGH (ref 70–99)
GLUCOSE SERPL-MCNC: 127 MG/DL — HIGH (ref 70–99)
GLUCOSE SERPL-MCNC: 129 MG/DL — HIGH (ref 70–99)
GLUCOSE SERPL-MCNC: 137 MG/DL — HIGH (ref 70–99)
GLUCOSE SERPL-MCNC: 142 MG/DL — HIGH (ref 70–99)
GLUCOSE SERPL-MCNC: 150 MG/DL — HIGH (ref 70–99)
GLUCOSE SERPL-MCNC: 155 MG/DL — HIGH (ref 70–99)
GLUCOSE SERPL-MCNC: 156 MG/DL — HIGH (ref 70–99)
GLUCOSE SERPL-MCNC: 167 MG/DL — HIGH (ref 70–99)
GLUCOSE SERPL-MCNC: 170 MG/DL — HIGH (ref 70–99)
GLUCOSE SERPL-MCNC: 171 MG/DL — HIGH (ref 70–99)
GLUCOSE SERPL-MCNC: 175 MG/DL — HIGH (ref 70–99)
GLUCOSE SERPL-MCNC: 191 MG/DL — HIGH (ref 70–99)
GLUCOSE SERPL-MCNC: 196 MG/DL — HIGH (ref 70–99)
GLUCOSE SERPL-MCNC: 232 MG/DL — HIGH (ref 70–99)
GLUCOSE SERPL-MCNC: 87 MG/DL — SIGNIFICANT CHANGE UP (ref 70–99)
GLUCOSE UR QL: NEGATIVE MG/DL — SIGNIFICANT CHANGE UP
GLUCOSE UR QL: NEGATIVE — SIGNIFICANT CHANGE UP
GRAM STN FLD: SIGNIFICANT CHANGE UP
GRAN CASTS # UR COMP ASSIST: ABNORMAL /LPF
HAV IGM SER-ACNC: SIGNIFICANT CHANGE UP
HBV CORE IGM SER-ACNC: SIGNIFICANT CHANGE UP
HBV DNA # SERPL NAA+PROBE: SIGNIFICANT CHANGE UP IU/ML
HBV DNA SERPL NAA+PROBE-LOG#: SIGNIFICANT CHANGE UP LOG10IU/ML
HBV SURFACE AB SER-ACNC: 7.2 MIU/ML — LOW
HBV SURFACE AB SER-ACNC: REACTIVE
HBV SURFACE AG SER-ACNC: SIGNIFICANT CHANGE UP
HCO3 BLDA-SCNC: 10 MMOL/L — LOW (ref 21–29)
HCO3 BLDA-SCNC: 12 MMOL/L — LOW (ref 21–29)
HCO3 BLDA-SCNC: 12 MMOL/L — LOW (ref 21–29)
HCO3 BLDA-SCNC: 14 MMOL/L — LOW (ref 21–29)
HCO3 BLDA-SCNC: 19 MMOL/L — LOW (ref 21–29)
HCO3 BLDA-SCNC: 19 MMOL/L — LOW (ref 21–29)
HCO3 BLDA-SCNC: 20 MMOL/L — LOW (ref 21–29)
HCO3 BLDA-SCNC: 20 MMOL/L — LOW (ref 21–29)
HCO3 BLDA-SCNC: 23 MMOL/L — SIGNIFICANT CHANGE UP (ref 21–29)
HCO3 BLDV-SCNC: 16 MMOL/L — LOW (ref 21–29)
HCO3 BLDV-SCNC: 17 MMOL/L — LOW (ref 21–29)
HCT VFR BLD CALC: 18.5 % — CRITICAL LOW (ref 39–50)
HCT VFR BLD CALC: 20.4 % — CRITICAL LOW (ref 39–50)
HCT VFR BLD CALC: 20.7 % — CRITICAL LOW (ref 39–50)
HCT VFR BLD CALC: 20.7 % — CRITICAL LOW (ref 39–50)
HCT VFR BLD CALC: 20.9 % — CRITICAL LOW (ref 39–50)
HCT VFR BLD CALC: 21.1 % — LOW (ref 39–50)
HCT VFR BLD CALC: 21.3 % — LOW (ref 39–50)
HCT VFR BLD CALC: 21.3 % — LOW (ref 39–50)
HCT VFR BLD CALC: 21.7 % — LOW (ref 39–50)
HCT VFR BLD CALC: 22.2 % — LOW (ref 39–50)
HCT VFR BLD CALC: 22.2 % — LOW (ref 39–50)
HCT VFR BLD CALC: 22.3 % — LOW (ref 39–50)
HCT VFR BLD CALC: 22.7 % — LOW (ref 39–50)
HCT VFR BLD CALC: 22.9 % — LOW (ref 39–50)
HCT VFR BLD CALC: 23.2 % — LOW (ref 39–50)
HCT VFR BLD CALC: 23.2 % — LOW (ref 39–50)
HCT VFR BLD CALC: 23.6 % — LOW (ref 39–50)
HCT VFR BLD CALC: 23.8 % — LOW (ref 39–50)
HCT VFR BLD CALC: 24.3 % — LOW (ref 39–50)
HCT VFR BLD CALC: 24.9 % — LOW (ref 39–50)
HCT VFR BLD CALC: 25.3 % — LOW (ref 39–50)
HCT VFR BLD CALC: 25.4 % — LOW (ref 39–50)
HCT VFR BLD CALC: 25.5 % — LOW (ref 39–50)
HCT VFR BLD CALC: 26.1 % — LOW (ref 39–50)
HCT VFR BLD CALC: 26.8 % — LOW (ref 39–50)
HCT VFR BLD CALC: 27.2 % — LOW (ref 39–50)
HCT VFR BLD CALC: 27.7 % — LOW (ref 39–50)
HCT VFR BLD CALC: 28.7 % — LOW (ref 39–50)
HCT VFR BLD CALC: 28.9 % — LOW (ref 39–50)
HCT VFR BLD CALC: 29.7 % — LOW (ref 39–50)
HCT VFR BLD CALC: 30.1 % — LOW (ref 39–50)
HCT VFR BLD CALC: 30.9 % — LOW (ref 39–50)
HCT VFR BLD CALC: 31.4 % — LOW (ref 39–50)
HCT VFR BLD CALC: 32.7 % — LOW (ref 39–50)
HCT VFR BLD CALC: 32.9 % — LOW (ref 39–50)
HCV AB S/CO SERPL IA: 0.13 S/CO — SIGNIFICANT CHANGE UP (ref 0–0.99)
HCV AB SERPL-IMP: SIGNIFICANT CHANGE UP
HCV RNA SERPL NAA DL=5-ACNC: SIGNIFICANT CHANGE UP IU/ML
HCV RNA SPEC NAA+PROBE-LOG IU: SIGNIFICANT CHANGE UP LOG10IU/ML
HDLC SERPL-MCNC: 58 MG/DL — SIGNIFICANT CHANGE UP
HGB BLD-MCNC: 10 G/DL — LOW (ref 13–17)
HGB BLD-MCNC: 10 G/DL — LOW (ref 13–17)
HGB BLD-MCNC: 10.1 G/DL — LOW (ref 13–17)
HGB BLD-MCNC: 11 G/DL — LOW (ref 13–17)
HGB BLD-MCNC: 11.5 G/DL — LOW (ref 13–17)
HGB BLD-MCNC: 6.2 G/DL — CRITICAL LOW (ref 13–17)
HGB BLD-MCNC: 6.6 G/DL — CRITICAL LOW (ref 13–17)
HGB BLD-MCNC: 6.7 G/DL — CRITICAL LOW (ref 13–17)
HGB BLD-MCNC: 6.9 G/DL — CRITICAL LOW (ref 13–17)
HGB BLD-MCNC: 7.1 G/DL — LOW (ref 13–17)
HGB BLD-MCNC: 7.2 G/DL — LOW (ref 13–17)
HGB BLD-MCNC: 7.3 G/DL — LOW (ref 13–17)
HGB BLD-MCNC: 7.3 G/DL — LOW (ref 13–17)
HGB BLD-MCNC: 7.5 G/DL — LOW (ref 13–17)
HGB BLD-MCNC: 7.6 G/DL — LOW (ref 13–17)
HGB BLD-MCNC: 7.6 G/DL — LOW (ref 13–17)
HGB BLD-MCNC: 7.8 G/DL — LOW (ref 13–17)
HGB BLD-MCNC: 7.8 G/DL — LOW (ref 13–17)
HGB BLD-MCNC: 8 G/DL — LOW (ref 13–17)
HGB BLD-MCNC: 8.1 G/DL — LOW (ref 13–17)
HGB BLD-MCNC: 8.2 G/DL — LOW (ref 13–17)
HGB BLD-MCNC: 8.2 G/DL — LOW (ref 13–17)
HGB BLD-MCNC: 8.3 G/DL — LOW (ref 13–17)
HGB BLD-MCNC: 8.4 G/DL — LOW (ref 13–17)
HGB BLD-MCNC: 8.8 G/DL — LOW (ref 13–17)
HGB BLD-MCNC: 8.9 G/DL — LOW (ref 13–17)
HGB BLD-MCNC: 9.2 G/DL — LOW (ref 13–17)
HGB BLD-MCNC: 9.2 G/DL — LOW (ref 13–17)
HGB BLD-MCNC: 9.6 G/DL — LOW (ref 13–17)
HGB BLD-MCNC: 9.9 G/DL — LOW (ref 13–17)
HIV 1+2 AB+HIV1 P24 AG SERPL QL IA: SIGNIFICANT CHANGE UP
HIV-1 VIRAL LOAD RESULT: SIGNIFICANT CHANGE UP
HIV1 RNA # SERPL NAA+PROBE: SIGNIFICANT CHANGE UP
HIV1 RNA SER-IMP: SIGNIFICANT CHANGE UP
HIV1 RNA SERPL NAA+PROBE-ACNC: SIGNIFICANT CHANGE UP
HIV1 RNA SERPL NAA+PROBE-LOG#: SIGNIFICANT CHANGE UP LG COP/ML
HOROWITZ INDEX BLDA+IHG-RTO: 0.28 — SIGNIFICANT CHANGE UP
HOROWITZ INDEX BLDA+IHG-RTO: 0.4 — SIGNIFICANT CHANGE UP
HOROWITZ INDEX BLDA+IHG-RTO: 0.5 — SIGNIFICANT CHANGE UP
HOROWITZ INDEX BLDA+IHG-RTO: 100 — SIGNIFICANT CHANGE UP
HOROWITZ INDEX BLDA+IHG-RTO: 28 — SIGNIFICANT CHANGE UP
HOROWITZ INDEX BLDA+IHG-RTO: 40 — SIGNIFICANT CHANGE UP
HOROWITZ INDEX BLDV+IHG-RTO: 28 — SIGNIFICANT CHANGE UP
HOROWITZ INDEX BLDV+IHG-RTO: SIGNIFICANT CHANGE UP
HYALINE CASTS # UR AUTO: 0 /LPF — SIGNIFICANT CHANGE UP (ref 0–7)
IMM GRANULOCYTES NFR BLD AUTO: 1.5 % — SIGNIFICANT CHANGE UP (ref 0–1.5)
IMM GRANULOCYTES NFR BLD AUTO: 10.7 % — HIGH (ref 0–1.5)
IMM GRANULOCYTES NFR BLD AUTO: 5 % — HIGH (ref 0–1.5)
IMM GRANULOCYTES NFR BLD AUTO: 7 % — HIGH (ref 0–1.5)
IMM GRANULOCYTES NFR BLD AUTO: 7.1 % — HIGH (ref 0–1.5)
INR BLD: 1.02 RATIO — SIGNIFICANT CHANGE UP (ref 0.88–1.16)
INR BLD: 1.03 RATIO — SIGNIFICANT CHANGE UP (ref 0.88–1.16)
INR BLD: 1.15 RATIO — SIGNIFICANT CHANGE UP (ref 0.88–1.16)
INR BLD: 1.24 RATIO — HIGH (ref 0.88–1.16)
INR BLD: 1.5 RATIO — HIGH (ref 0.88–1.16)
KETONES UR-MCNC: NEGATIVE — SIGNIFICANT CHANGE UP
KETONES UR-MCNC: NEGATIVE — SIGNIFICANT CHANGE UP
LACTATE SERPL-SCNC: 1.4 MMOL/L — SIGNIFICANT CHANGE UP (ref 0.7–2)
LACTATE SERPL-SCNC: 4.5 MMOL/L — CRITICAL HIGH (ref 0.7–2)
LACTATE SERPL-SCNC: 5 MMOL/L — CRITICAL HIGH (ref 0.7–2)
LACTATE SERPL-SCNC: 5.2 MMOL/L — CRITICAL HIGH (ref 0.7–2)
LACTATE SERPL-SCNC: 5.7 MMOL/L — CRITICAL HIGH (ref 0.7–2)
LACTATE SERPL-SCNC: 7.6 MMOL/L — CRITICAL HIGH (ref 0.7–2)
LACTATE SERPL-SCNC: 9.3 MMOL/L — CRITICAL HIGH (ref 0.7–2)
LEUKOCYTE ESTERASE UR-ACNC: ABNORMAL
LEUKOCYTE ESTERASE UR-ACNC: NEGATIVE — SIGNIFICANT CHANGE UP
LG PLATELETS BLD QL AUTO: SLIGHT — SIGNIFICANT CHANGE UP
LIPID PNL WITH DIRECT LDL SERPL: 57 MG/DL — SIGNIFICANT CHANGE UP
LYMPHOCYTES # BLD AUTO: 0 % — LOW (ref 13–44)
LYMPHOCYTES # BLD AUTO: 0 K/UL — LOW (ref 1–3.3)
LYMPHOCYTES # BLD AUTO: 0.3 K/UL — LOW (ref 1–3.3)
LYMPHOCYTES # BLD AUTO: 0.38 K/UL — LOW (ref 1–3.3)
LYMPHOCYTES # BLD AUTO: 0.43 K/UL — LOW (ref 1–3.3)
LYMPHOCYTES # BLD AUTO: 0.6 K/UL — LOW (ref 1–3.3)
LYMPHOCYTES # BLD AUTO: 0.65 K/UL — LOW (ref 1–3.3)
LYMPHOCYTES # BLD AUTO: 0.95 K/UL — LOW (ref 1–3.3)
LYMPHOCYTES # BLD AUTO: 1.67 K/UL — SIGNIFICANT CHANGE UP (ref 1–3.3)
LYMPHOCYTES # BLD AUTO: 1.7 % — LOW (ref 13–44)
LYMPHOCYTES # BLD AUTO: 16 % — SIGNIFICANT CHANGE UP (ref 13–44)
LYMPHOCYTES # BLD AUTO: 3.1 % — LOW (ref 13–44)
LYMPHOCYTES # BLD AUTO: 3.3 % — LOW (ref 13–44)
LYMPHOCYTES # BLD AUTO: 4 % — LOW (ref 13–44)
M TB IFN-G BLD-IMP: NEGATIVE — SIGNIFICANT CHANGE UP
M TB IFN-G CD4+ BCKGRND COR BLD-ACNC: 0.03 IU/ML — SIGNIFICANT CHANGE UP
M TB IFN-G CD4+CD8+ BCKGRND COR BLD-ACNC: 0.04 IU/ML — SIGNIFICANT CHANGE UP
MACROCYTES BLD QL: SLIGHT — SIGNIFICANT CHANGE UP
MAGNESIUM SERPL-MCNC: 1.6 MG/DL — SIGNIFICANT CHANGE UP (ref 1.6–2.6)
MAGNESIUM SERPL-MCNC: 2.3 MG/DL — SIGNIFICANT CHANGE UP (ref 1.6–2.6)
MAGNESIUM SERPL-MCNC: 2.4 MG/DL — SIGNIFICANT CHANGE UP (ref 1.6–2.6)
MAGNESIUM SERPL-MCNC: 2.5 MG/DL — SIGNIFICANT CHANGE UP (ref 1.6–2.6)
MAGNESIUM SERPL-MCNC: 2.6 MG/DL — SIGNIFICANT CHANGE UP (ref 1.6–2.6)
MAGNESIUM SERPL-MCNC: 2.7 MG/DL — HIGH (ref 1.6–2.6)
MAGNESIUM SERPL-MCNC: 2.7 MG/DL — HIGH (ref 1.6–2.6)
MAGNESIUM SERPL-MCNC: 2.9 MG/DL — HIGH (ref 1.6–2.6)
MANUAL SMEAR VERIFICATION: SIGNIFICANT CHANGE UP
MANUAL SMEAR VERIFICATION: SIGNIFICANT CHANGE UP
MCHC RBC-ENTMCNC: 27.9 PG — SIGNIFICANT CHANGE UP (ref 27–34)
MCHC RBC-ENTMCNC: 28.6 PG — SIGNIFICANT CHANGE UP (ref 27–34)
MCHC RBC-ENTMCNC: 28.8 PG — SIGNIFICANT CHANGE UP (ref 27–34)
MCHC RBC-ENTMCNC: 28.9 PG — SIGNIFICANT CHANGE UP (ref 27–34)
MCHC RBC-ENTMCNC: 28.9 PG — SIGNIFICANT CHANGE UP (ref 27–34)
MCHC RBC-ENTMCNC: 29 PG — SIGNIFICANT CHANGE UP (ref 27–34)
MCHC RBC-ENTMCNC: 29.1 PG — SIGNIFICANT CHANGE UP (ref 27–34)
MCHC RBC-ENTMCNC: 29.1 PG — SIGNIFICANT CHANGE UP (ref 27–34)
MCHC RBC-ENTMCNC: 29.2 PG — SIGNIFICANT CHANGE UP (ref 27–34)
MCHC RBC-ENTMCNC: 29.3 PG — SIGNIFICANT CHANGE UP (ref 27–34)
MCHC RBC-ENTMCNC: 29.4 PG — SIGNIFICANT CHANGE UP (ref 27–34)
MCHC RBC-ENTMCNC: 29.4 PG — SIGNIFICANT CHANGE UP (ref 27–34)
MCHC RBC-ENTMCNC: 29.5 PG — SIGNIFICANT CHANGE UP (ref 27–34)
MCHC RBC-ENTMCNC: 29.5 PG — SIGNIFICANT CHANGE UP (ref 27–34)
MCHC RBC-ENTMCNC: 29.6 PG — SIGNIFICANT CHANGE UP (ref 27–34)
MCHC RBC-ENTMCNC: 29.7 PG — SIGNIFICANT CHANGE UP (ref 27–34)
MCHC RBC-ENTMCNC: 29.7 PG — SIGNIFICANT CHANGE UP (ref 27–34)
MCHC RBC-ENTMCNC: 29.8 PG — SIGNIFICANT CHANGE UP (ref 27–34)
MCHC RBC-ENTMCNC: 29.9 PG — SIGNIFICANT CHANGE UP (ref 27–34)
MCHC RBC-ENTMCNC: 30.1 PG — SIGNIFICANT CHANGE UP (ref 27–34)
MCHC RBC-ENTMCNC: 30.2 PG — SIGNIFICANT CHANGE UP (ref 27–34)
MCHC RBC-ENTMCNC: 30.2 PG — SIGNIFICANT CHANGE UP (ref 27–34)
MCHC RBC-ENTMCNC: 30.4 GM/DL — LOW (ref 32–36)
MCHC RBC-ENTMCNC: 30.6 PG — SIGNIFICANT CHANGE UP (ref 27–34)
MCHC RBC-ENTMCNC: 32.4 GM/DL — SIGNIFICANT CHANGE UP (ref 32–36)
MCHC RBC-ENTMCNC: 32.5 GM/DL — SIGNIFICANT CHANGE UP (ref 32–36)
MCHC RBC-ENTMCNC: 32.7 GM/DL — SIGNIFICANT CHANGE UP (ref 32–36)
MCHC RBC-ENTMCNC: 32.8 GM/DL — SIGNIFICANT CHANGE UP (ref 32–36)
MCHC RBC-ENTMCNC: 32.9 GM/DL — SIGNIFICANT CHANGE UP (ref 32–36)
MCHC RBC-ENTMCNC: 33.1 GM/DL — SIGNIFICANT CHANGE UP (ref 32–36)
MCHC RBC-ENTMCNC: 33.2 GM/DL — SIGNIFICANT CHANGE UP (ref 32–36)
MCHC RBC-ENTMCNC: 33.4 GM/DL — SIGNIFICANT CHANGE UP (ref 32–36)
MCHC RBC-ENTMCNC: 33.5 GM/DL — SIGNIFICANT CHANGE UP (ref 32–36)
MCHC RBC-ENTMCNC: 33.6 GM/DL — SIGNIFICANT CHANGE UP (ref 32–36)
MCHC RBC-ENTMCNC: 33.6 GM/DL — SIGNIFICANT CHANGE UP (ref 32–36)
MCHC RBC-ENTMCNC: 33.7 GM/DL — SIGNIFICANT CHANGE UP (ref 32–36)
MCHC RBC-ENTMCNC: 33.8 GM/DL — SIGNIFICANT CHANGE UP (ref 32–36)
MCHC RBC-ENTMCNC: 34 GM/DL — SIGNIFICANT CHANGE UP (ref 32–36)
MCHC RBC-ENTMCNC: 34 GM/DL — SIGNIFICANT CHANGE UP (ref 32–36)
MCHC RBC-ENTMCNC: 34.2 GM/DL — SIGNIFICANT CHANGE UP (ref 32–36)
MCHC RBC-ENTMCNC: 34.3 GM/DL — SIGNIFICANT CHANGE UP (ref 32–36)
MCHC RBC-ENTMCNC: 34.3 GM/DL — SIGNIFICANT CHANGE UP (ref 32–36)
MCHC RBC-ENTMCNC: 34.4 GM/DL — SIGNIFICANT CHANGE UP (ref 32–36)
MCHC RBC-ENTMCNC: 34.4 GM/DL — SIGNIFICANT CHANGE UP (ref 32–36)
MCHC RBC-ENTMCNC: 34.7 GM/DL — SIGNIFICANT CHANGE UP (ref 32–36)
MCHC RBC-ENTMCNC: 34.7 GM/DL — SIGNIFICANT CHANGE UP (ref 32–36)
MCHC RBC-ENTMCNC: 34.9 GM/DL — SIGNIFICANT CHANGE UP (ref 32–36)
MCHC RBC-ENTMCNC: 34.9 GM/DL — SIGNIFICANT CHANGE UP (ref 32–36)
MCHC RBC-ENTMCNC: 35 GM/DL — SIGNIFICANT CHANGE UP (ref 32–36)
MCHC RBC-ENTMCNC: 35.2 GM/DL — SIGNIFICANT CHANGE UP (ref 32–36)
MCHC RBC-ENTMCNC: 35.2 GM/DL — SIGNIFICANT CHANGE UP (ref 32–36)
MCHC RBC-ENTMCNC: 35.7 GM/DL — SIGNIFICANT CHANGE UP (ref 32–36)
MCHC RBC-ENTMCNC: 35.8 GM/DL — SIGNIFICANT CHANGE UP (ref 32–36)
MCHC RBC-ENTMCNC: 36.3 GM/DL — HIGH (ref 32–36)
MCV RBC AUTO: 80.2 FL — SIGNIFICANT CHANGE UP (ref 80–100)
MCV RBC AUTO: 81.2 FL — SIGNIFICANT CHANGE UP (ref 80–100)
MCV RBC AUTO: 82.6 FL — SIGNIFICANT CHANGE UP (ref 80–100)
MCV RBC AUTO: 82.8 FL — SIGNIFICANT CHANGE UP (ref 80–100)
MCV RBC AUTO: 84.4 FL — SIGNIFICANT CHANGE UP (ref 80–100)
MCV RBC AUTO: 84.5 FL — SIGNIFICANT CHANGE UP (ref 80–100)
MCV RBC AUTO: 84.7 FL — SIGNIFICANT CHANGE UP (ref 80–100)
MCV RBC AUTO: 84.7 FL — SIGNIFICANT CHANGE UP (ref 80–100)
MCV RBC AUTO: 85 FL — SIGNIFICANT CHANGE UP (ref 80–100)
MCV RBC AUTO: 85 FL — SIGNIFICANT CHANGE UP (ref 80–100)
MCV RBC AUTO: 85.2 FL — SIGNIFICANT CHANGE UP (ref 80–100)
MCV RBC AUTO: 85.3 FL — SIGNIFICANT CHANGE UP (ref 80–100)
MCV RBC AUTO: 85.4 FL — SIGNIFICANT CHANGE UP (ref 80–100)
MCV RBC AUTO: 85.7 FL — SIGNIFICANT CHANGE UP (ref 80–100)
MCV RBC AUTO: 85.8 FL — SIGNIFICANT CHANGE UP (ref 80–100)
MCV RBC AUTO: 85.9 FL — SIGNIFICANT CHANGE UP (ref 80–100)
MCV RBC AUTO: 85.9 FL — SIGNIFICANT CHANGE UP (ref 80–100)
MCV RBC AUTO: 86 FL — SIGNIFICANT CHANGE UP (ref 80–100)
MCV RBC AUTO: 86.1 FL — SIGNIFICANT CHANGE UP (ref 80–100)
MCV RBC AUTO: 86.2 FL — SIGNIFICANT CHANGE UP (ref 80–100)
MCV RBC AUTO: 86.6 FL — SIGNIFICANT CHANGE UP (ref 80–100)
MCV RBC AUTO: 87.9 FL — SIGNIFICANT CHANGE UP (ref 80–100)
MCV RBC AUTO: 88.7 FL — SIGNIFICANT CHANGE UP (ref 80–100)
MCV RBC AUTO: 88.8 FL — SIGNIFICANT CHANGE UP (ref 80–100)
MCV RBC AUTO: 89.1 FL — SIGNIFICANT CHANGE UP (ref 80–100)
MCV RBC AUTO: 89.2 FL — SIGNIFICANT CHANGE UP (ref 80–100)
MCV RBC AUTO: 89.3 FL — SIGNIFICANT CHANGE UP (ref 80–100)
MCV RBC AUTO: 89.3 FL — SIGNIFICANT CHANGE UP (ref 80–100)
MCV RBC AUTO: 89.5 FL — SIGNIFICANT CHANGE UP (ref 80–100)
MCV RBC AUTO: 89.8 FL — SIGNIFICANT CHANGE UP (ref 80–100)
MCV RBC AUTO: 90.4 FL — SIGNIFICANT CHANGE UP (ref 80–100)
MCV RBC AUTO: 91 FL — SIGNIFICANT CHANGE UP (ref 80–100)
MCV RBC AUTO: 91.7 FL — SIGNIFICANT CHANGE UP (ref 80–100)
MCV RBC AUTO: 91.7 FL — SIGNIFICANT CHANGE UP (ref 80–100)
MCV RBC AUTO: 94.8 FL — SIGNIFICANT CHANGE UP (ref 80–100)
METHOD TYPE: SIGNIFICANT CHANGE UP
METHOD TYPE: SIGNIFICANT CHANGE UP
MONOCYTES # BLD AUTO: 0.44 K/UL — SIGNIFICANT CHANGE UP (ref 0–0.9)
MONOCYTES # BLD AUTO: 0.81 K/UL — SIGNIFICANT CHANGE UP (ref 0–0.9)
MONOCYTES # BLD AUTO: 0.83 K/UL — SIGNIFICANT CHANGE UP (ref 0–0.9)
MONOCYTES # BLD AUTO: 1.08 K/UL — HIGH (ref 0–0.9)
MONOCYTES # BLD AUTO: 1.28 K/UL — HIGH (ref 0–0.9)
MONOCYTES # BLD AUTO: 1.59 K/UL — HIGH (ref 0–0.9)
MONOCYTES # BLD AUTO: 1.67 K/UL — HIGH (ref 0–0.9)
MONOCYTES # BLD AUTO: 2.52 K/UL — HIGH (ref 0–0.9)
MONOCYTES NFR BLD AUTO: 2 % — SIGNIFICANT CHANGE UP (ref 2–14)
MONOCYTES NFR BLD AUTO: 5 % — SIGNIFICANT CHANGE UP (ref 2–14)
MONOCYTES NFR BLD AUTO: 5.1 % — SIGNIFICANT CHANGE UP (ref 2–14)
MONOCYTES NFR BLD AUTO: 6.3 % — SIGNIFICANT CHANGE UP (ref 2–14)
MONOCYTES NFR BLD AUTO: 7.5 % — SIGNIFICANT CHANGE UP (ref 2–14)
MONOCYTES NFR BLD AUTO: 8 % — SIGNIFICANT CHANGE UP (ref 2–14)
MONOCYTES NFR BLD AUTO: 8.1 % — SIGNIFICANT CHANGE UP (ref 2–14)
MONOCYTES NFR BLD AUTO: 8.6 % — SIGNIFICANT CHANGE UP (ref 2–14)
MYELOCYTES NFR BLD: 3 % — HIGH (ref 0–0)
NEUTROPHILS # BLD AUTO: 14.51 K/UL — HIGH (ref 1.8–7.4)
NEUTROPHILS # BLD AUTO: 14.64 K/UL — HIGH (ref 1.8–7.4)
NEUTROPHILS # BLD AUTO: 17.07 K/UL — HIGH (ref 1.8–7.4)
NEUTROPHILS # BLD AUTO: 18.67 K/UL — HIGH (ref 1.8–7.4)
NEUTROPHILS # BLD AUTO: 20.69 K/UL — HIGH (ref 1.8–7.4)
NEUTROPHILS # BLD AUTO: 21.79 K/UL — HIGH (ref 1.8–7.4)
NEUTROPHILS # BLD AUTO: 24.11 K/UL — HIGH (ref 1.8–7.4)
NEUTROPHILS # BLD AUTO: 6.99 K/UL — SIGNIFICANT CHANGE UP (ref 1.8–7.4)
NEUTROPHILS NFR BLD AUTO: 67 % — SIGNIFICANT CHANGE UP (ref 43–77)
NEUTROPHILS NFR BLD AUTO: 82 % — HIGH (ref 43–77)
NEUTROPHILS NFR BLD AUTO: 82.5 % — HIGH (ref 43–77)
NEUTROPHILS NFR BLD AUTO: 83.6 % — HIGH (ref 43–77)
NEUTROPHILS NFR BLD AUTO: 84.4 % — HIGH (ref 43–77)
NEUTROPHILS NFR BLD AUTO: 87.2 % — HIGH (ref 43–77)
NEUTROPHILS NFR BLD AUTO: 88 % — HIGH (ref 43–77)
NEUTROPHILS NFR BLD AUTO: 96 % — HIGH (ref 43–77)
NEUTS BAND # BLD: 2 % — SIGNIFICANT CHANGE UP (ref 0–8)
NITRITE UR-MCNC: NEGATIVE — SIGNIFICANT CHANGE UP
NITRITE UR-MCNC: NEGATIVE — SIGNIFICANT CHANGE UP
NON HDL CHOLESTEROL: 79 MG/DL — SIGNIFICANT CHANGE UP
NRBC # BLD: 0 /100 WBCS — SIGNIFICANT CHANGE UP (ref 0–0)
NRBC # BLD: 0 /100 — SIGNIFICANT CHANGE UP (ref 0–0)
NRBC # BLD: 0 /100 — SIGNIFICANT CHANGE UP (ref 0–0)
NRBC # BLD: 1 /100 WBCS — HIGH (ref 0–0)
NRBC # BLD: 2 /100 WBCS — HIGH (ref 0–0)
NRBC # BLD: SIGNIFICANT CHANGE UP /100 WBCS (ref 0–0)
OB PNL STL: NEGATIVE — SIGNIFICANT CHANGE UP
ORGANISM # SPEC MICROSCOPIC CNT: SIGNIFICANT CHANGE UP
OVALOCYTES BLD QL SMEAR: SIGNIFICANT CHANGE UP
P-ANCA SER-ACNC: NEGATIVE — SIGNIFICANT CHANGE UP
PCO2 BLDA: 23 MMHG — LOW (ref 32–46)
PCO2 BLDA: 26 MMHG — LOW (ref 32–46)
PCO2 BLDA: 30 MMHG — LOW (ref 32–46)
PCO2 BLDA: 31 MMHG — LOW (ref 32–46)
PCO2 BLDA: 35 MMHG — SIGNIFICANT CHANGE UP (ref 32–46)
PCO2 BLDA: 38 MMHG — SIGNIFICANT CHANGE UP (ref 32–46)
PCO2 BLDA: 39 MMHG — SIGNIFICANT CHANGE UP (ref 32–46)
PCO2 BLDA: 40 MMHG — SIGNIFICANT CHANGE UP (ref 32–46)
PCO2 BLDA: 44 MMHG — SIGNIFICANT CHANGE UP (ref 32–46)
PCO2 BLDV: 31 MMHG — LOW (ref 35–50)
PCO2 BLDV: 34 MMHG — LOW (ref 35–50)
PH BLD: 7.04 — CRITICAL LOW (ref 7.35–7.45)
PH BLD: 7.08 — CRITICAL LOW (ref 7.35–7.45)
PH BLD: 7.22 — LOW (ref 7.35–7.45)
PH BLD: 7.24 — LOW (ref 7.35–7.45)
PH BLD: 7.32 — LOW (ref 7.35–7.45)
PH BLD: 7.39 — SIGNIFICANT CHANGE UP (ref 7.35–7.45)
PH BLD: 7.42 — SIGNIFICANT CHANGE UP (ref 7.35–7.45)
PH BLD: 7.48 — HIGH (ref 7.35–7.45)
PH BLD: 7.53 — HIGH (ref 7.35–7.45)
PH BLDV: 7.3 — LOW (ref 7.35–7.45)
PH BLDV: 7.35 — SIGNIFICANT CHANGE UP (ref 7.35–7.45)
PH UR: 5 — SIGNIFICANT CHANGE UP (ref 5–8)
PH UR: 6 — SIGNIFICANT CHANGE UP (ref 5–8)
PHOSPHATE SERPL-MCNC: 3.1 MG/DL — SIGNIFICANT CHANGE UP (ref 2.5–4.5)
PHOSPHATE SERPL-MCNC: 3.3 MG/DL — SIGNIFICANT CHANGE UP (ref 2.5–4.5)
PHOSPHATE SERPL-MCNC: 3.4 MG/DL — SIGNIFICANT CHANGE UP (ref 2.5–4.5)
PHOSPHATE SERPL-MCNC: 3.4 MG/DL — SIGNIFICANT CHANGE UP (ref 2.5–4.5)
PHOSPHATE SERPL-MCNC: 3.5 MG/DL — SIGNIFICANT CHANGE UP (ref 2.5–4.5)
PHOSPHATE SERPL-MCNC: 3.7 MG/DL — SIGNIFICANT CHANGE UP (ref 2.5–4.5)
PHOSPHATE SERPL-MCNC: 4.1 MG/DL — SIGNIFICANT CHANGE UP (ref 2.5–4.5)
PHOSPHATE SERPL-MCNC: 4.3 MG/DL — SIGNIFICANT CHANGE UP (ref 2.5–4.5)
PHOSPHATE SERPL-MCNC: 4.5 MG/DL — SIGNIFICANT CHANGE UP (ref 2.5–4.5)
PHOSPHATE SERPL-MCNC: 4.6 MG/DL — HIGH (ref 2.5–4.5)
PHOSPHATE SERPL-MCNC: 4.7 MG/DL — HIGH (ref 2.5–4.5)
PHOSPHATE SERPL-MCNC: 4.8 MG/DL — HIGH (ref 2.5–4.5)
PHOSPHATE SERPL-MCNC: 4.9 MG/DL — HIGH (ref 2.5–4.5)
PHOSPHATE SERPL-MCNC: 4.9 MG/DL — HIGH (ref 2.5–4.5)
PHOSPHATE SERPL-MCNC: 5 MG/DL — HIGH (ref 2.5–4.5)
PHOSPHATE SERPL-MCNC: 5 MG/DL — HIGH (ref 2.5–4.5)
PHOSPHATE SERPL-MCNC: 5.1 MG/DL — HIGH (ref 2.5–4.5)
PHOSPHATE SERPL-MCNC: 5.4 MG/DL — HIGH (ref 2.5–4.5)
PHOSPHATE SERPL-MCNC: 5.6 MG/DL — HIGH (ref 2.5–4.5)
PHOSPHATE SERPL-MCNC: 6.2 MG/DL — HIGH (ref 2.5–4.5)
PHOSPHATE SERPL-MCNC: 7 MG/DL — HIGH (ref 2.5–4.5)
PLAT MORPH BLD: NORMAL — SIGNIFICANT CHANGE UP
PLAT MORPH BLD: NORMAL — SIGNIFICANT CHANGE UP
PLAT MORPH BLD: SIGNIFICANT CHANGE UP
PLATELET # BLD AUTO: 107 K/UL — LOW (ref 150–400)
PLATELET # BLD AUTO: 118 K/UL — LOW (ref 150–400)
PLATELET # BLD AUTO: 119 K/UL — LOW (ref 150–400)
PLATELET # BLD AUTO: 122 K/UL — LOW (ref 150–400)
PLATELET # BLD AUTO: 123 K/UL — LOW (ref 150–400)
PLATELET # BLD AUTO: 123 K/UL — LOW (ref 150–400)
PLATELET # BLD AUTO: 127 K/UL — LOW (ref 150–400)
PLATELET # BLD AUTO: 134 K/UL — LOW (ref 150–400)
PLATELET # BLD AUTO: 137 K/UL — LOW (ref 150–400)
PLATELET # BLD AUTO: 142 K/UL — LOW (ref 150–400)
PLATELET # BLD AUTO: 150 K/UL — SIGNIFICANT CHANGE UP (ref 150–400)
PLATELET # BLD AUTO: 156 K/UL — SIGNIFICANT CHANGE UP (ref 150–400)
PLATELET # BLD AUTO: 166 K/UL — SIGNIFICANT CHANGE UP (ref 150–400)
PLATELET # BLD AUTO: 185 K/UL — SIGNIFICANT CHANGE UP (ref 150–400)
PLATELET # BLD AUTO: 196 K/UL — SIGNIFICANT CHANGE UP (ref 150–400)
PLATELET # BLD AUTO: 212 K/UL — SIGNIFICANT CHANGE UP (ref 150–400)
PLATELET # BLD AUTO: 222 K/UL — SIGNIFICANT CHANGE UP (ref 150–400)
PLATELET # BLD AUTO: 23 K/UL — LOW (ref 150–400)
PLATELET # BLD AUTO: 34 K/UL — LOW (ref 150–400)
PLATELET # BLD AUTO: 36 K/UL — LOW (ref 150–400)
PLATELET # BLD AUTO: 42 K/UL — LOW (ref 150–400)
PLATELET # BLD AUTO: 49 K/UL — LOW (ref 150–400)
PLATELET # BLD AUTO: 52 K/UL — LOW (ref 150–400)
PLATELET # BLD AUTO: 72 K/UL — LOW (ref 150–400)
PLATELET # BLD AUTO: 74 K/UL — LOW (ref 150–400)
PLATELET # BLD AUTO: 80 K/UL — LOW (ref 150–400)
PLATELET # BLD AUTO: 81 K/UL — LOW (ref 150–400)
PLATELET # BLD AUTO: 86 K/UL — LOW (ref 150–400)
PLATELET # BLD AUTO: 88 K/UL — LOW (ref 150–400)
PLATELET # BLD AUTO: 90 K/UL — LOW (ref 150–400)
PLATELET # BLD AUTO: 93 K/UL — LOW (ref 150–400)
PLATELET # BLD AUTO: 93 K/UL — LOW (ref 150–400)
PLATELET # BLD AUTO: 94 K/UL — LOW (ref 150–400)
PLATELET # BLD AUTO: 95 K/UL — LOW (ref 150–400)
PLATELET # BLD AUTO: SIGNIFICANT CHANGE UP (ref 150–400)
PLATELET COUNT - ESTIMATE: ABNORMAL
PO2 BLDA: 121 MMHG — HIGH (ref 74–108)
PO2 BLDA: 160 MMHG — HIGH (ref 74–108)
PO2 BLDA: 315 MMHG — HIGH (ref 74–108)
PO2 BLDA: 49 MMHG — CRITICAL LOW (ref 74–108)
PO2 BLDA: 82 MMHG — SIGNIFICANT CHANGE UP (ref 74–108)
PO2 BLDA: 84 MMHG — SIGNIFICANT CHANGE UP (ref 74–108)
PO2 BLDA: 85 MMHG — SIGNIFICANT CHANGE UP (ref 74–108)
PO2 BLDA: 86 MMHG — SIGNIFICANT CHANGE UP (ref 74–108)
PO2 BLDA: <42 MMHG — CRITICAL LOW (ref 74–108)
PO2 BLDV: 216 MMHG — HIGH (ref 25–45)
PO2 BLDV: 48 MMHG — HIGH (ref 25–45)
POIKILOCYTOSIS BLD QL AUTO: SLIGHT — SIGNIFICANT CHANGE UP
POLYCHROMASIA BLD QL SMEAR: SLIGHT — SIGNIFICANT CHANGE UP
POTASSIUM SERPL-MCNC: 3.2 MMOL/L — LOW (ref 3.5–5.3)
POTASSIUM SERPL-MCNC: 3.5 MMOL/L — SIGNIFICANT CHANGE UP (ref 3.5–5.3)
POTASSIUM SERPL-MCNC: 3.6 MMOL/L — SIGNIFICANT CHANGE UP (ref 3.5–5.3)
POTASSIUM SERPL-MCNC: 3.6 MMOL/L — SIGNIFICANT CHANGE UP (ref 3.5–5.3)
POTASSIUM SERPL-MCNC: 3.7 MMOL/L — SIGNIFICANT CHANGE UP (ref 3.5–5.3)
POTASSIUM SERPL-MCNC: 3.7 MMOL/L — SIGNIFICANT CHANGE UP (ref 3.5–5.3)
POTASSIUM SERPL-MCNC: 3.8 MMOL/L — SIGNIFICANT CHANGE UP (ref 3.5–5.3)
POTASSIUM SERPL-MCNC: 3.8 MMOL/L — SIGNIFICANT CHANGE UP (ref 3.5–5.3)
POTASSIUM SERPL-MCNC: 3.9 MMOL/L — SIGNIFICANT CHANGE UP (ref 3.5–5.3)
POTASSIUM SERPL-MCNC: 4 MMOL/L — SIGNIFICANT CHANGE UP (ref 3.5–5.3)
POTASSIUM SERPL-MCNC: 4.1 MMOL/L — SIGNIFICANT CHANGE UP (ref 3.5–5.3)
POTASSIUM SERPL-MCNC: 4.2 MMOL/L — SIGNIFICANT CHANGE UP (ref 3.5–5.3)
POTASSIUM SERPL-MCNC: 4.2 MMOL/L — SIGNIFICANT CHANGE UP (ref 3.5–5.3)
POTASSIUM SERPL-MCNC: 4.3 MMOL/L — SIGNIFICANT CHANGE UP (ref 3.5–5.3)
POTASSIUM SERPL-MCNC: 4.4 MMOL/L — SIGNIFICANT CHANGE UP (ref 3.5–5.3)
POTASSIUM SERPL-MCNC: 4.4 MMOL/L — SIGNIFICANT CHANGE UP (ref 3.5–5.3)
POTASSIUM SERPL-MCNC: 4.5 MMOL/L — SIGNIFICANT CHANGE UP (ref 3.5–5.3)
POTASSIUM SERPL-MCNC: 4.5 MMOL/L — SIGNIFICANT CHANGE UP (ref 3.5–5.3)
POTASSIUM SERPL-MCNC: 5.1 MMOL/L — SIGNIFICANT CHANGE UP (ref 3.5–5.3)
POTASSIUM SERPL-MCNC: 5.4 MMOL/L — HIGH (ref 3.5–5.3)
POTASSIUM SERPL-SCNC: 3.2 MMOL/L — LOW (ref 3.5–5.3)
POTASSIUM SERPL-SCNC: 3.5 MMOL/L — SIGNIFICANT CHANGE UP (ref 3.5–5.3)
POTASSIUM SERPL-SCNC: 3.6 MMOL/L — SIGNIFICANT CHANGE UP (ref 3.5–5.3)
POTASSIUM SERPL-SCNC: 3.6 MMOL/L — SIGNIFICANT CHANGE UP (ref 3.5–5.3)
POTASSIUM SERPL-SCNC: 3.7 MMOL/L — SIGNIFICANT CHANGE UP (ref 3.5–5.3)
POTASSIUM SERPL-SCNC: 3.7 MMOL/L — SIGNIFICANT CHANGE UP (ref 3.5–5.3)
POTASSIUM SERPL-SCNC: 3.8 MMOL/L — SIGNIFICANT CHANGE UP (ref 3.5–5.3)
POTASSIUM SERPL-SCNC: 3.8 MMOL/L — SIGNIFICANT CHANGE UP (ref 3.5–5.3)
POTASSIUM SERPL-SCNC: 3.9 MMOL/L — SIGNIFICANT CHANGE UP (ref 3.5–5.3)
POTASSIUM SERPL-SCNC: 4 MMOL/L — SIGNIFICANT CHANGE UP (ref 3.5–5.3)
POTASSIUM SERPL-SCNC: 4.1 MMOL/L — SIGNIFICANT CHANGE UP (ref 3.5–5.3)
POTASSIUM SERPL-SCNC: 4.2 MMOL/L — SIGNIFICANT CHANGE UP (ref 3.5–5.3)
POTASSIUM SERPL-SCNC: 4.2 MMOL/L — SIGNIFICANT CHANGE UP (ref 3.5–5.3)
POTASSIUM SERPL-SCNC: 4.3 MMOL/L — SIGNIFICANT CHANGE UP (ref 3.5–5.3)
POTASSIUM SERPL-SCNC: 4.4 MMOL/L — SIGNIFICANT CHANGE UP (ref 3.5–5.3)
POTASSIUM SERPL-SCNC: 4.4 MMOL/L — SIGNIFICANT CHANGE UP (ref 3.5–5.3)
POTASSIUM SERPL-SCNC: 4.5 MMOL/L — SIGNIFICANT CHANGE UP (ref 3.5–5.3)
POTASSIUM SERPL-SCNC: 4.5 MMOL/L — SIGNIFICANT CHANGE UP (ref 3.5–5.3)
POTASSIUM SERPL-SCNC: 5.1 MMOL/L — SIGNIFICANT CHANGE UP (ref 3.5–5.3)
POTASSIUM SERPL-SCNC: 5.4 MMOL/L — HIGH (ref 3.5–5.3)
PROCALCITONIN SERPL-MCNC: 0.58 NG/ML — HIGH (ref 0.02–0.1)
PROCALCITONIN SERPL-MCNC: 14.3 NG/ML — HIGH (ref 0.02–0.1)
PROCALCITONIN SERPL-MCNC: 6.26 NG/ML — HIGH (ref 0.02–0.1)
PROCALCITONIN SERPL-MCNC: 6.7 NG/ML — HIGH (ref 0.02–0.1)
PROT SERPL-MCNC: 3 GM/DL — LOW (ref 6–8.3)
PROT SERPL-MCNC: 4.1 GM/DL — LOW (ref 6–8.3)
PROT SERPL-MCNC: 4.4 GM/DL — LOW (ref 6–8.3)
PROT SERPL-MCNC: 4.4 GM/DL — LOW (ref 6–8.3)
PROT SERPL-MCNC: 4.6 GM/DL — LOW (ref 6–8.3)
PROT SERPL-MCNC: 4.7 GM/DL — LOW (ref 6–8.3)
PROT SERPL-MCNC: 4.8 GM/DL — LOW (ref 6–8.3)
PROT SERPL-MCNC: 4.9 GM/DL — LOW (ref 6–8.3)
PROT SERPL-MCNC: 5 GM/DL — LOW (ref 6–8.3)
PROT SERPL-MCNC: 5.1 GM/DL — LOW (ref 6–8.3)
PROT SERPL-MCNC: 5.3 GM/DL — LOW (ref 6–8.3)
PROT SERPL-MCNC: 5.4 GM/DL — LOW (ref 6–8.3)
PROT SERPL-MCNC: 5.4 GM/DL — LOW (ref 6–8.3)
PROT SERPL-MCNC: 6.6 G/DL — SIGNIFICANT CHANGE UP (ref 6–8.3)
PROT SERPL-MCNC: 6.6 GM/DL — SIGNIFICANT CHANGE UP (ref 6–8.3)
PROT SERPL-MCNC: 7.4 G/DL — SIGNIFICANT CHANGE UP (ref 6–8.3)
PROT UR-MCNC: 100 MG/DL
PROT UR-MCNC: ABNORMAL
PROTHROM AB SERPL-ACNC: 11.8 SEC — SIGNIFICANT CHANGE UP (ref 10.6–13.6)
PROTHROM AB SERPL-ACNC: 11.9 SEC — SIGNIFICANT CHANGE UP (ref 10.6–13.6)
PROTHROM AB SERPL-ACNC: 13.2 SEC — SIGNIFICANT CHANGE UP (ref 10.6–13.6)
PROTHROM AB SERPL-ACNC: 14.2 SEC — HIGH (ref 10.6–13.6)
PROTHROM AB SERPL-ACNC: 17.1 SEC — HIGH (ref 10.6–13.6)
QUANT TB PLUS MITOGEN MINUS NIL: 7.04 IU/ML — SIGNIFICANT CHANGE UP
RBC # BLD: 2.06 M/UL — LOW (ref 4.2–5.8)
RBC # BLD: 2.29 M/UL — LOW (ref 4.2–5.8)
RBC # BLD: 2.32 M/UL — LOW (ref 4.2–5.8)
RBC # BLD: 2.38 M/UL — LOW (ref 4.2–5.8)
RBC # BLD: 2.39 M/UL — LOW (ref 4.2–5.8)
RBC # BLD: 2.42 M/UL — LOW (ref 4.2–5.8)
RBC # BLD: 2.43 M/UL — LOW (ref 4.2–5.8)
RBC # BLD: 2.47 M/UL — LOW (ref 4.2–5.8)
RBC # BLD: 2.48 M/UL — LOW (ref 4.2–5.8)
RBC # BLD: 2.55 M/UL — LOW (ref 4.2–5.8)
RBC # BLD: 2.58 M/UL — LOW (ref 4.2–5.8)
RBC # BLD: 2.59 M/UL — LOW (ref 4.2–5.8)
RBC # BLD: 2.67 M/UL — LOW (ref 4.2–5.8)
RBC # BLD: 2.69 M/UL — LOW (ref 4.2–5.8)
RBC # BLD: 2.72 M/UL — LOW (ref 4.2–5.8)
RBC # BLD: 2.76 M/UL — LOW (ref 4.2–5.8)
RBC # BLD: 2.77 M/UL — LOW (ref 4.2–5.8)
RBC # BLD: 2.78 M/UL — LOW (ref 4.2–5.8)
RBC # BLD: 2.79 M/UL — LOW (ref 4.2–5.8)
RBC # BLD: 2.81 M/UL — LOW (ref 4.2–5.8)
RBC # BLD: 2.82 M/UL — LOW (ref 4.2–5.8)
RBC # BLD: 2.9 M/UL — LOW (ref 4.2–5.8)
RBC # BLD: 3.02 M/UL — LOW (ref 4.2–5.8)
RBC # BLD: 3.03 M/UL — LOW (ref 4.2–5.8)
RBC # BLD: 3.17 M/UL — LOW (ref 4.2–5.8)
RBC # BLD: 3.2 M/UL — LOW (ref 4.2–5.8)
RBC # BLD: 3.23 M/UL — LOW (ref 4.2–5.8)
RBC # BLD: 3.27 M/UL — LOW (ref 4.2–5.8)
RBC # BLD: 3.36 M/UL — LOW (ref 4.2–5.8)
RBC # BLD: 3.39 M/UL — LOW (ref 4.2–5.8)
RBC # BLD: 3.46 M/UL — LOW (ref 4.2–5.8)
RBC # BLD: 3.46 M/UL — LOW (ref 4.2–5.8)
RBC # BLD: 3.47 M/UL — LOW (ref 4.2–5.8)
RBC # BLD: 3.79 M/UL — LOW (ref 4.2–5.8)
RBC # BLD: 3.86 M/UL — LOW (ref 4.2–5.8)
RBC # FLD: 14.4 % — SIGNIFICANT CHANGE UP (ref 10.3–14.5)
RBC # FLD: 14.6 % — HIGH (ref 10.3–14.5)
RBC # FLD: 14.8 % — HIGH (ref 10.3–14.5)
RBC # FLD: 14.9 % — HIGH (ref 10.3–14.5)
RBC # FLD: 15.2 % — HIGH (ref 10.3–14.5)
RBC # FLD: 15.2 % — HIGH (ref 10.3–14.5)
RBC # FLD: 15.3 % — HIGH (ref 10.3–14.5)
RBC # FLD: 15.5 % — HIGH (ref 10.3–14.5)
RBC # FLD: 15.7 % — HIGH (ref 10.3–14.5)
RBC # FLD: 16 % — HIGH (ref 10.3–14.5)
RBC # FLD: 16.2 % — HIGH (ref 10.3–14.5)
RBC # FLD: 16.2 % — HIGH (ref 10.3–14.5)
RBC # FLD: 17.2 % — HIGH (ref 10.3–14.5)
RBC # FLD: 18.3 % — HIGH (ref 10.3–14.5)
RBC # FLD: 18.6 % — HIGH (ref 10.3–14.5)
RBC # FLD: 18.7 % — HIGH (ref 10.3–14.5)
RBC # FLD: 18.9 % — HIGH (ref 10.3–14.5)
RBC # FLD: 19.6 % — HIGH (ref 10.3–14.5)
RBC # FLD: 19.8 % — HIGH (ref 10.3–14.5)
RBC # FLD: 19.8 % — HIGH (ref 10.3–14.5)
RBC # FLD: 20 % — HIGH (ref 10.3–14.5)
RBC # FLD: 20.1 % — HIGH (ref 10.3–14.5)
RBC # FLD: 20.6 % — HIGH (ref 10.3–14.5)
RBC # FLD: 20.6 % — HIGH (ref 10.3–14.5)
RBC # FLD: 21.6 % — HIGH (ref 10.3–14.5)
RBC # FLD: 21.7 % — HIGH (ref 10.3–14.5)
RBC # FLD: 21.9 % — HIGH (ref 10.3–14.5)
RBC # FLD: 22.1 % — HIGH (ref 10.3–14.5)
RBC # FLD: 22.4 % — HIGH (ref 10.3–14.5)
RBC # FLD: 23.3 % — HIGH (ref 10.3–14.5)
RBC BLD AUTO: NORMAL — SIGNIFICANT CHANGE UP
RBC BLD AUTO: SIGNIFICANT CHANGE UP
RBC BLD AUTO: SIGNIFICANT CHANGE UP
RBC CASTS # UR COMP ASSIST: 0 /HPF — SIGNIFICANT CHANGE UP (ref 0–4)
RBC CASTS # UR COMP ASSIST: ABNORMAL /HPF (ref 0–4)
SAO2 % BLDA: 53 % — LOW (ref 92–96)
SAO2 % BLDA: 81 % — LOW (ref 92–96)
SAO2 % BLDA: 94 % — SIGNIFICANT CHANGE UP (ref 92–96)
SAO2 % BLDA: 96 % — SIGNIFICANT CHANGE UP (ref 92–96)
SAO2 % BLDA: 97 % — HIGH (ref 92–96)
SAO2 % BLDA: 97 % — HIGH (ref 92–96)
SAO2 % BLDA: 98 % — HIGH (ref 92–96)
SAO2 % BLDA: 99 % — HIGH (ref 92–96)
SAO2 % BLDA: 99 % — HIGH (ref 92–96)
SAO2 % BLDV: 100 % — HIGH (ref 67–88)
SAO2 % BLDV: 83 % — SIGNIFICANT CHANGE UP (ref 67–88)
SARS-COV-2 IGG+IGM SERPL QL IA: 28.6 INDEX — HIGH
SARS-COV-2 IGG+IGM SERPL QL IA: >250 U/ML — HIGH
SARS-COV-2 IGG+IGM SERPL QL IA: >250 U/ML — HIGH
SARS-COV-2 IGG+IGM SERPL QL IA: POSITIVE
SARS-COV-2 RNA SPEC QL NAA+PROBE: SIGNIFICANT CHANGE UP
SODIUM SERPL-SCNC: 125 MMOL/L — LOW (ref 135–145)
SODIUM SERPL-SCNC: 127 MMOL/L — LOW (ref 135–145)
SODIUM SERPL-SCNC: 129 MMOL/L — LOW (ref 135–145)
SODIUM SERPL-SCNC: 129 MMOL/L — LOW (ref 135–145)
SODIUM SERPL-SCNC: 131 MMOL/L — LOW (ref 135–145)
SODIUM SERPL-SCNC: 132 MMOL/L — LOW (ref 135–145)
SODIUM SERPL-SCNC: 133 MMOL/L — LOW (ref 135–145)
SODIUM SERPL-SCNC: 135 MMOL/L — SIGNIFICANT CHANGE UP (ref 135–145)
SODIUM SERPL-SCNC: 136 MMOL/L — SIGNIFICANT CHANGE UP (ref 135–145)
SODIUM SERPL-SCNC: 137 MMOL/L — SIGNIFICANT CHANGE UP (ref 135–145)
SODIUM SERPL-SCNC: 140 MMOL/L — SIGNIFICANT CHANGE UP (ref 135–145)
SODIUM SERPL-SCNC: 141 MMOL/L — SIGNIFICANT CHANGE UP (ref 135–145)
SODIUM SERPL-SCNC: 141 MMOL/L — SIGNIFICANT CHANGE UP (ref 135–145)
SODIUM SERPL-SCNC: 142 MMOL/L — SIGNIFICANT CHANGE UP (ref 135–145)
SODIUM SERPL-SCNC: 143 MMOL/L — SIGNIFICANT CHANGE UP (ref 135–145)
SODIUM SERPL-SCNC: 145 MMOL/L — SIGNIFICANT CHANGE UP (ref 135–145)
SODIUM SERPL-SCNC: 146 MMOL/L — HIGH (ref 135–145)
SP GR SPEC: 1.01 — SIGNIFICANT CHANGE UP (ref 1.01–1.02)
SP GR SPEC: 1.02 — SIGNIFICANT CHANGE UP (ref 1.01–1.02)
SPECIMEN SOURCE: SIGNIFICANT CHANGE UP
T PALLIDUM AB TITR SER: NEGATIVE — SIGNIFICANT CHANGE UP
TRIGL SERPL-MCNC: 107 MG/DL — SIGNIFICANT CHANGE UP
TROPONIN I SERPL-MCNC: 0.28 NG/ML — HIGH (ref 0.01–0.04)
TROPONIN I SERPL-MCNC: 0.5 NG/ML — HIGH (ref 0.01–0.04)
TROPONIN I SERPL-MCNC: 0.51 NG/ML — HIGH (ref 0.01–0.04)
TSH SERPL-MCNC: 3.16 UIU/ML — SIGNIFICANT CHANGE UP (ref 0.27–4.2)
UROBILINOGEN FLD QL: NEGATIVE MG/DL — SIGNIFICANT CHANGE UP
UROBILINOGEN FLD QL: SIGNIFICANT CHANGE UP
VARIANT LYMPHS # BLD: 1 % — SIGNIFICANT CHANGE UP (ref 0–6)
VIT B12 SERPL-MCNC: 435 PG/ML — SIGNIFICANT CHANGE UP (ref 232–1245)
WBC # BLD: 10.43 K/UL — SIGNIFICANT CHANGE UP (ref 3.8–10.5)
WBC # BLD: 11.79 K/UL — HIGH (ref 3.8–10.5)
WBC # BLD: 11.82 K/UL — HIGH (ref 3.8–10.5)
WBC # BLD: 11.91 K/UL — HIGH (ref 3.8–10.5)
WBC # BLD: 12.03 K/UL — HIGH (ref 3.8–10.5)
WBC # BLD: 12.38 K/UL — HIGH (ref 3.8–10.5)
WBC # BLD: 12.41 K/UL — HIGH (ref 3.8–10.5)
WBC # BLD: 13.02 K/UL — HIGH (ref 3.8–10.5)
WBC # BLD: 16.16 K/UL — HIGH (ref 3.8–10.5)
WBC # BLD: 16.27 K/UL — HIGH (ref 3.8–10.5)
WBC # BLD: 17.21 K/UL — HIGH (ref 3.8–10.5)
WBC # BLD: 17.6 K/UL — HIGH (ref 3.8–10.5)
WBC # BLD: 18.15 K/UL — HIGH (ref 3.8–10.5)
WBC # BLD: 19.18 K/UL — HIGH (ref 3.8–10.5)
WBC # BLD: 19.57 K/UL — HIGH (ref 3.8–10.5)
WBC # BLD: 20.02 K/UL — HIGH (ref 3.8–10.5)
WBC # BLD: 20.17 K/UL — HIGH (ref 3.8–10.5)
WBC # BLD: 20.62 K/UL — HIGH (ref 3.8–10.5)
WBC # BLD: 21.04 K/UL — HIGH (ref 3.8–10.5)
WBC # BLD: 21.45 K/UL — HIGH (ref 3.8–10.5)
WBC # BLD: 21.76 K/UL — HIGH (ref 3.8–10.5)
WBC # BLD: 22.23 K/UL — HIGH (ref 3.8–10.5)
WBC # BLD: 22.35 K/UL — HIGH (ref 3.8–10.5)
WBC # BLD: 22.89 K/UL — HIGH (ref 3.8–10.5)
WBC # BLD: 23.87 K/UL — HIGH (ref 3.8–10.5)
WBC # BLD: 24.49 K/UL — HIGH (ref 3.8–10.5)
WBC # BLD: 24.86 K/UL — HIGH (ref 3.8–10.5)
WBC # BLD: 24.94 K/UL — HIGH (ref 3.8–10.5)
WBC # BLD: 25.22 K/UL — HIGH (ref 3.8–10.5)
WBC # BLD: 27.61 K/UL — HIGH (ref 3.8–10.5)
WBC # BLD: 27.8 K/UL — HIGH (ref 3.8–10.5)
WBC # BLD: 29.2 K/UL — HIGH (ref 3.8–10.5)
WBC # BLD: 31.04 K/UL — HIGH (ref 3.8–10.5)
WBC # BLD: 9.07 K/UL — SIGNIFICANT CHANGE UP (ref 3.8–10.5)
WBC # BLD: 9.17 K/UL — SIGNIFICANT CHANGE UP (ref 3.8–10.5)
WBC # FLD AUTO: 10.43 K/UL — SIGNIFICANT CHANGE UP (ref 3.8–10.5)
WBC # FLD AUTO: 11.79 K/UL — HIGH (ref 3.8–10.5)
WBC # FLD AUTO: 11.82 K/UL — HIGH (ref 3.8–10.5)
WBC # FLD AUTO: 11.91 K/UL — HIGH (ref 3.8–10.5)
WBC # FLD AUTO: 12.03 K/UL — HIGH (ref 3.8–10.5)
WBC # FLD AUTO: 12.38 K/UL — HIGH (ref 3.8–10.5)
WBC # FLD AUTO: 12.41 K/UL — HIGH (ref 3.8–10.5)
WBC # FLD AUTO: 13.02 K/UL — HIGH (ref 3.8–10.5)
WBC # FLD AUTO: 16.16 K/UL — HIGH (ref 3.8–10.5)
WBC # FLD AUTO: 16.27 K/UL — HIGH (ref 3.8–10.5)
WBC # FLD AUTO: 17.21 K/UL — HIGH (ref 3.8–10.5)
WBC # FLD AUTO: 17.6 K/UL — HIGH (ref 3.8–10.5)
WBC # FLD AUTO: 18.15 K/UL — HIGH (ref 3.8–10.5)
WBC # FLD AUTO: 19.18 K/UL — HIGH (ref 3.8–10.5)
WBC # FLD AUTO: 19.57 K/UL — HIGH (ref 3.8–10.5)
WBC # FLD AUTO: 20.02 K/UL — HIGH (ref 3.8–10.5)
WBC # FLD AUTO: 20.17 K/UL — HIGH (ref 3.8–10.5)
WBC # FLD AUTO: 20.62 K/UL — HIGH (ref 3.8–10.5)
WBC # FLD AUTO: 21.04 K/UL — HIGH (ref 3.8–10.5)
WBC # FLD AUTO: 21.45 K/UL — HIGH (ref 3.8–10.5)
WBC # FLD AUTO: 21.76 K/UL — HIGH (ref 3.8–10.5)
WBC # FLD AUTO: 22.23 K/UL — HIGH (ref 3.8–10.5)
WBC # FLD AUTO: 22.35 K/UL — HIGH (ref 3.8–10.5)
WBC # FLD AUTO: 22.89 K/UL — HIGH (ref 3.8–10.5)
WBC # FLD AUTO: 23.87 K/UL — HIGH (ref 3.8–10.5)
WBC # FLD AUTO: 24.49 K/UL — HIGH (ref 3.8–10.5)
WBC # FLD AUTO: 24.86 K/UL — HIGH (ref 3.8–10.5)
WBC # FLD AUTO: 24.94 K/UL — HIGH (ref 3.8–10.5)
WBC # FLD AUTO: 25.22 K/UL — HIGH (ref 3.8–10.5)
WBC # FLD AUTO: 27.61 K/UL — HIGH (ref 3.8–10.5)
WBC # FLD AUTO: 27.8 K/UL — HIGH (ref 3.8–10.5)
WBC # FLD AUTO: 29.2 K/UL — HIGH (ref 3.8–10.5)
WBC # FLD AUTO: 31.04 K/UL — HIGH (ref 3.8–10.5)
WBC # FLD AUTO: 9.07 K/UL — SIGNIFICANT CHANGE UP (ref 3.8–10.5)
WBC # FLD AUTO: 9.17 K/UL — SIGNIFICANT CHANGE UP (ref 3.8–10.5)
WBC UR QL: 1 /HPF — SIGNIFICANT CHANGE UP (ref 0–5)
WBC UR QL: ABNORMAL

## 2021-01-01 PROCEDURE — 86753 PROTOZOA ANTIBODY NOS: CPT

## 2021-01-01 PROCEDURE — 73700 CT LOWER EXTREMITY W/O DYE: CPT | Mod: 26,RT

## 2021-01-01 PROCEDURE — 99291 CRITICAL CARE FIRST HOUR: CPT

## 2021-01-01 PROCEDURE — 36556 INSERT NON-TUNNEL CV CATH: CPT

## 2021-01-01 PROCEDURE — 80061 LIPID PANEL: CPT

## 2021-01-01 PROCEDURE — 87522 HEPATITIS C REVRS TRNSCRPJ: CPT

## 2021-01-01 PROCEDURE — 99232 SBSQ HOSP IP/OBS MODERATE 35: CPT

## 2021-01-01 PROCEDURE — 93010 ELECTROCARDIOGRAM REPORT: CPT

## 2021-01-01 PROCEDURE — 76700 US EXAM ABDOM COMPLETE: CPT | Mod: 26

## 2021-01-01 PROCEDURE — 99213 OFFICE O/P EST LOW 20 MIN: CPT | Mod: GC

## 2021-01-01 PROCEDURE — 71045 X-RAY EXAM CHEST 1 VIEW: CPT | Mod: 26,76

## 2021-01-01 PROCEDURE — 87536 HIV-1 QUANT&REVRSE TRNSCRPJ: CPT

## 2021-01-01 PROCEDURE — 99291 CRITICAL CARE FIRST HOUR: CPT | Mod: 25

## 2021-01-01 PROCEDURE — 99204 OFFICE O/P NEW MOD 45 MIN: CPT | Mod: GC

## 2021-01-01 PROCEDURE — 31500 INSERT EMERGENCY AIRWAY: CPT

## 2021-01-01 PROCEDURE — 86780 TREPONEMA PALLIDUM: CPT

## 2021-01-01 PROCEDURE — 99292 CRITICAL CARE ADDL 30 MIN: CPT | Mod: 25

## 2021-01-01 PROCEDURE — 84443 ASSAY THYROID STIM HORMONE: CPT

## 2021-01-01 PROCEDURE — 80053 COMPREHEN METABOLIC PANEL: CPT

## 2021-01-01 PROCEDURE — 71045 X-RAY EXAM CHEST 1 VIEW: CPT | Mod: 26

## 2021-01-01 PROCEDURE — 99233 SBSQ HOSP IP/OBS HIGH 50: CPT

## 2021-01-01 PROCEDURE — G0463: CPT

## 2021-01-01 PROCEDURE — 93306 TTE W/DOPPLER COMPLETE: CPT | Mod: 26

## 2021-01-01 PROCEDURE — 86618 LYME DISEASE ANTIBODY: CPT

## 2021-01-01 PROCEDURE — 86769 SARS-COV-2 COVID-19 ANTIBODY: CPT

## 2021-01-01 PROCEDURE — 85027 COMPLETE CBC AUTOMATED: CPT

## 2021-01-01 PROCEDURE — 86480 TB TEST CELL IMMUN MEASURE: CPT

## 2021-01-01 PROCEDURE — 73700 CT LOWER EXTREMITY W/O DYE: CPT | Mod: 26,50

## 2021-01-01 PROCEDURE — 86706 HEP B SURFACE ANTIBODY: CPT

## 2021-01-01 PROCEDURE — 72192 CT PELVIS W/O DYE: CPT | Mod: 26

## 2021-01-01 PROCEDURE — 43753 TX GASTRO INTUB W/ASP: CPT

## 2021-01-01 PROCEDURE — 81001 URINALYSIS AUTO W/SCOPE: CPT

## 2021-01-01 PROCEDURE — 73502 X-RAY EXAM HIP UNI 2-3 VIEWS: CPT | Mod: 26,RT

## 2021-01-01 PROCEDURE — 86666 EHRLICHIA ANTIBODY: CPT

## 2021-01-01 PROCEDURE — 99285 EMERGENCY DEPT VISIT HI MDM: CPT

## 2021-01-01 PROCEDURE — 74176 CT ABD & PELVIS W/O CONTRAST: CPT | Mod: 26

## 2021-01-01 PROCEDURE — 87389 HIV-1 AG W/HIV-1&-2 AB AG IA: CPT

## 2021-01-01 PROCEDURE — 76705 ECHO EXAM OF ABDOMEN: CPT | Mod: 26

## 2021-01-01 PROCEDURE — 31500 INSERT EMERGENCY AIRWAY: CPT | Mod: GC

## 2021-01-01 PROCEDURE — 87517 HEPATITIS B DNA QUANT: CPT

## 2021-01-01 PROCEDURE — 73080 X-RAY EXAM OF ELBOW: CPT | Mod: 26,RT

## 2021-01-01 PROCEDURE — 93010 ELECTROCARDIOGRAM REPORT: CPT | Mod: 76

## 2021-01-01 PROCEDURE — 36600 WITHDRAWAL OF ARTERIAL BLOOD: CPT

## 2021-01-01 PROCEDURE — 73552 X-RAY EXAM OF FEMUR 2/>: CPT | Mod: 26,RT

## 2021-01-01 PROCEDURE — 99253 IP/OBS CNSLTJ NEW/EST LOW 45: CPT

## 2021-01-01 PROCEDURE — 82607 VITAMIN B-12: CPT

## 2021-01-01 PROCEDURE — 36620 INSERTION CATHETER ARTERY: CPT

## 2021-01-01 PROCEDURE — 82746 ASSAY OF FOLIC ACID SERUM: CPT

## 2021-01-01 RX ORDER — MIDAZOLAM HYDROCHLORIDE 1 MG/ML
2 INJECTION, SOLUTION INTRAMUSCULAR; INTRAVENOUS ONCE
Refills: 0 | Status: DISCONTINUED | OUTPATIENT
Start: 2021-01-01 | End: 2021-01-01

## 2021-01-01 RX ORDER — PROPOFOL 10 MG/ML
60 INJECTION, EMULSION INTRAVENOUS ONCE
Refills: 0 | Status: COMPLETED | OUTPATIENT
Start: 2021-01-01 | End: 2021-01-01

## 2021-01-01 RX ORDER — HYDRALAZINE HCL 50 MG
10 TABLET ORAL ONCE
Refills: 0 | Status: COMPLETED | OUTPATIENT
Start: 2021-01-01 | End: 2021-01-01

## 2021-01-01 RX ORDER — VANCOMYCIN HCL 1 G
500 VIAL (EA) INTRAVENOUS ONCE
Refills: 0 | Status: COMPLETED | OUTPATIENT
Start: 2021-01-01 | End: 2021-01-01

## 2021-01-01 RX ORDER — SODIUM CHLORIDE 9 MG/ML
1000 INJECTION, SOLUTION INTRAVENOUS
Refills: 0 | Status: DISCONTINUED | OUTPATIENT
Start: 2021-01-01 | End: 2021-01-01

## 2021-01-01 RX ORDER — SODIUM CHLORIDE 9 MG/ML
500 INJECTION INTRAMUSCULAR; INTRAVENOUS; SUBCUTANEOUS ONCE
Refills: 0 | Status: COMPLETED | OUTPATIENT
Start: 2021-01-01 | End: 2021-01-01

## 2021-01-01 RX ORDER — ATORVASTATIN CALCIUM 80 MG/1
40 TABLET, FILM COATED ORAL AT BEDTIME
Refills: 0 | Status: DISCONTINUED | OUTPATIENT
Start: 2021-01-01 | End: 2021-01-01

## 2021-01-01 RX ORDER — MIDAZOLAM HYDROCHLORIDE 1 MG/ML
0.02 INJECTION, SOLUTION INTRAMUSCULAR; INTRAVENOUS
Qty: 100 | Refills: 0 | Status: DISCONTINUED | OUTPATIENT
Start: 2021-01-01 | End: 2021-01-01

## 2021-01-01 RX ORDER — MIDODRINE HYDROCHLORIDE 2.5 MG/1
15 TABLET ORAL THREE TIMES A DAY
Refills: 0 | Status: DISCONTINUED | OUTPATIENT
Start: 2021-01-01 | End: 2021-01-01

## 2021-01-01 RX ORDER — MIDODRINE HYDROCHLORIDE 2.5 MG/1
2.5 TABLET ORAL
Refills: 0 | Status: ACTIVE | COMMUNITY

## 2021-01-01 RX ORDER — CHLORHEXIDINE GLUCONATE 213 G/1000ML
1 SOLUTION TOPICAL DAILY
Refills: 0 | Status: DISCONTINUED | OUTPATIENT
Start: 2021-01-01 | End: 2021-01-01

## 2021-01-01 RX ORDER — MIDAZOLAM HYDROCHLORIDE 1 MG/ML
4 INJECTION, SOLUTION INTRAMUSCULAR; INTRAVENOUS ONCE
Refills: 0 | Status: DISCONTINUED | OUTPATIENT
Start: 2021-01-01 | End: 2021-01-01

## 2021-01-01 RX ORDER — MOMETASONE FUROATE AND FORMOTEROL FUMARATE DIHYDRATE 200; 5 UG/1; UG/1
200-5 AEROSOL RESPIRATORY (INHALATION)
Refills: 0 | Status: ACTIVE | COMMUNITY

## 2021-01-01 RX ORDER — ENOXAPARIN SODIUM 100 MG/ML
30 INJECTION SUBCUTANEOUS ONCE
Refills: 0 | Status: DISCONTINUED | OUTPATIENT
Start: 2021-01-01 | End: 2021-01-01

## 2021-01-01 RX ORDER — FUROSEMIDE 40 MG
40 TABLET ORAL ONCE
Refills: 0 | Status: COMPLETED | OUTPATIENT
Start: 2021-01-01 | End: 2021-01-01

## 2021-01-01 RX ORDER — LABETALOL HCL 100 MG
10 TABLET ORAL ONCE
Refills: 0 | Status: COMPLETED | OUTPATIENT
Start: 2021-01-01 | End: 2021-01-01

## 2021-01-01 RX ORDER — NOREPINEPHRINE BITARTRATE/D5W 8 MG/250ML
0.05 PLASTIC BAG, INJECTION (ML) INTRAVENOUS
Qty: 16 | Refills: 0 | Status: DISCONTINUED | OUTPATIENT
Start: 2021-01-01 | End: 2021-01-01

## 2021-01-01 RX ORDER — INSULIN LISPRO 100/ML
VIAL (ML) SUBCUTANEOUS EVERY 6 HOURS
Refills: 0 | Status: DISCONTINUED | OUTPATIENT
Start: 2021-01-01 | End: 2021-01-01

## 2021-01-01 RX ORDER — MIDODRINE HYDROCHLORIDE 2.5 MG/1
2.5 TABLET ORAL THREE TIMES A DAY
Refills: 0 | Status: DISCONTINUED | OUTPATIENT
Start: 2021-01-01 | End: 2021-01-01

## 2021-01-01 RX ORDER — DEXTROSE 50 % IN WATER 50 %
15 SYRINGE (ML) INTRAVENOUS ONCE
Refills: 0 | Status: DISCONTINUED | OUTPATIENT
Start: 2021-01-01 | End: 2021-01-01

## 2021-01-01 RX ORDER — PHENYLEPHRINE HYDROCHLORIDE 10 MG/ML
4 INJECTION INTRAVENOUS
Qty: 160 | Refills: 0 | Status: DISCONTINUED | OUTPATIENT
Start: 2021-01-01 | End: 2021-01-01

## 2021-01-01 RX ORDER — HEPARIN SODIUM 5000 [USP'U]/ML
5000 INJECTION INTRAVENOUS; SUBCUTANEOUS EVERY 12 HOURS
Refills: 0 | Status: CANCELLED | OUTPATIENT
Start: 2021-01-01 | End: 2021-01-01

## 2021-01-01 RX ORDER — MIRTAZAPINE 45 MG/1
1 TABLET, ORALLY DISINTEGRATING ORAL
Qty: 0 | Refills: 0 | DISCHARGE

## 2021-01-01 RX ORDER — SODIUM CHLORIDE 9 MG/ML
10 INJECTION INTRAMUSCULAR; INTRAVENOUS; SUBCUTANEOUS
Refills: 0 | Status: DISCONTINUED | OUTPATIENT
Start: 2021-01-01 | End: 2021-01-01

## 2021-01-01 RX ORDER — MORPHINE SULFATE 50 MG/1
2 CAPSULE, EXTENDED RELEASE ORAL ONCE
Refills: 0 | Status: DISCONTINUED | OUTPATIENT
Start: 2021-01-01 | End: 2021-01-01

## 2021-01-01 RX ORDER — ATORVASTATIN CALCIUM 80 MG/1
1 TABLET, FILM COATED ORAL
Qty: 0 | Refills: 0 | DISCHARGE

## 2021-01-01 RX ORDER — MIDODRINE HYDROCHLORIDE 2.5 MG/1
5 TABLET ORAL EVERY 8 HOURS
Refills: 0 | Status: DISCONTINUED | OUTPATIENT
Start: 2021-01-01 | End: 2021-01-01

## 2021-01-01 RX ORDER — CHLORHEXIDINE GLUCONATE 213 G/1000ML
15 SOLUTION TOPICAL EVERY 12 HOURS
Refills: 0 | Status: DISCONTINUED | OUTPATIENT
Start: 2021-01-01 | End: 2021-01-01

## 2021-01-01 RX ORDER — DEXTROSE 50 % IN WATER 50 %
50 SYRINGE (ML) INTRAVENOUS ONCE
Refills: 0 | Status: COMPLETED | OUTPATIENT
Start: 2021-01-01 | End: 2021-01-01

## 2021-01-01 RX ORDER — MEROPENEM 1 G/30ML
250 INJECTION INTRAVENOUS EVERY 24 HOURS
Refills: 0 | Status: DISCONTINUED | OUTPATIENT
Start: 2021-01-01 | End: 2021-01-01

## 2021-01-01 RX ORDER — POLYETHYLENE GLYCOL 3350 17 G/17G
17 POWDER, FOR SOLUTION ORAL
Refills: 0 | Status: ACTIVE | COMMUNITY

## 2021-01-01 RX ORDER — PANTOPRAZOLE SODIUM 20 MG/1
40 TABLET, DELAYED RELEASE ORAL
Refills: 0 | Status: DISCONTINUED | OUTPATIENT
Start: 2021-01-01 | End: 2021-01-01

## 2021-01-01 RX ORDER — SODIUM CHLORIDE 9 MG/ML
1000 INJECTION, SOLUTION INTRAVENOUS ONCE
Refills: 0 | Status: COMPLETED | OUTPATIENT
Start: 2021-01-01 | End: 2021-01-01

## 2021-01-01 RX ORDER — PANTOPRAZOLE SODIUM 20 MG/1
40 TABLET, DELAYED RELEASE ORAL EVERY 12 HOURS
Refills: 0 | Status: DISCONTINUED | OUTPATIENT
Start: 2021-01-01 | End: 2021-01-01

## 2021-01-01 RX ORDER — PANTOPRAZOLE SODIUM 20 MG/1
40 TABLET, DELAYED RELEASE ORAL DAILY
Refills: 0 | Status: DISCONTINUED | OUTPATIENT
Start: 2021-01-01 | End: 2021-01-01

## 2021-01-01 RX ORDER — CHLORHEXIDINE GLUCONATE 213 G/1000ML
1 SOLUTION TOPICAL
Refills: 0 | Status: DISCONTINUED | OUTPATIENT
Start: 2021-01-01 | End: 2021-01-01

## 2021-01-01 RX ORDER — ACETAMINOPHEN 500 MG
1000 TABLET ORAL ONCE
Refills: 0 | Status: COMPLETED | OUTPATIENT
Start: 2021-01-01 | End: 2021-01-01

## 2021-01-01 RX ORDER — DESMOPRESSIN ACETATE 0.1 MG/1
20 TABLET ORAL ONCE
Refills: 0 | Status: COMPLETED | OUTPATIENT
Start: 2021-01-01 | End: 2021-01-01

## 2021-01-01 RX ORDER — MIDODRINE HYDROCHLORIDE 2.5 MG/1
10 TABLET ORAL EVERY 8 HOURS
Refills: 0 | Status: DISCONTINUED | OUTPATIENT
Start: 2021-01-01 | End: 2021-01-01

## 2021-01-01 RX ORDER — SODIUM BICARBONATE 1 MEQ/ML
0.23 SYRINGE (ML) INTRAVENOUS
Qty: 150 | Refills: 0 | Status: DISCONTINUED | OUTPATIENT
Start: 2021-01-01 | End: 2021-01-01

## 2021-01-01 RX ORDER — PETROLATUM,WHITE
1 JELLY (GRAM) TOPICAL EVERY 12 HOURS
Refills: 0 | Status: DISCONTINUED | OUTPATIENT
Start: 2021-01-01 | End: 2021-01-01

## 2021-01-01 RX ORDER — PIPERACILLIN AND TAZOBACTAM 4; .5 G/20ML; G/20ML
3.38 INJECTION, POWDER, LYOPHILIZED, FOR SOLUTION INTRAVENOUS EVERY 12 HOURS
Refills: 0 | Status: DISCONTINUED | OUTPATIENT
Start: 2021-01-01 | End: 2021-01-01

## 2021-01-01 RX ORDER — DEXTROSE 50 % IN WATER 50 %
25 SYRINGE (ML) INTRAVENOUS ONCE
Refills: 0 | Status: COMPLETED | OUTPATIENT
Start: 2021-01-01 | End: 2021-01-01

## 2021-01-01 RX ORDER — SODIUM BICARBONATE 1 MEQ/ML
100 SYRINGE (ML) INTRAVENOUS ONCE
Refills: 0 | Status: COMPLETED | OUTPATIENT
Start: 2021-01-01 | End: 2021-01-01

## 2021-01-01 RX ORDER — ASCORBIC ACID 60 MG
500 TABLET,CHEWABLE ORAL
Refills: 0 | Status: DISCONTINUED | OUTPATIENT
Start: 2021-01-01 | End: 2021-01-01

## 2021-01-01 RX ORDER — MIDODRINE HYDROCHLORIDE 2.5 MG/1
1 TABLET ORAL
Qty: 90 | Refills: 0

## 2021-01-01 RX ORDER — BUDESONIDE AND FORMOTEROL FUMARATE DIHYDRATE 160; 4.5 UG/1; UG/1
2 AEROSOL RESPIRATORY (INHALATION)
Refills: 0 | Status: DISCONTINUED | OUTPATIENT
Start: 2021-01-01 | End: 2021-01-01

## 2021-01-01 RX ORDER — FERROUS SULFATE 325(65) MG
1 TABLET ORAL
Qty: 0 | Refills: 0 | DISCHARGE

## 2021-01-01 RX ORDER — VASOPRESSIN 20 [USP'U]/ML
0.04 INJECTION INTRAVENOUS
Qty: 50 | Refills: 0 | Status: DISCONTINUED | OUTPATIENT
Start: 2021-01-01 | End: 2021-01-01

## 2021-01-01 RX ORDER — HYDROCORTISONE 20 MG
50 TABLET ORAL EVERY 8 HOURS
Refills: 0 | Status: DISCONTINUED | OUTPATIENT
Start: 2021-01-01 | End: 2021-01-01

## 2021-01-01 RX ORDER — SODIUM CHLORIDE 9 MG/ML
1000 INJECTION INTRAMUSCULAR; INTRAVENOUS; SUBCUTANEOUS
Refills: 0 | Status: DISCONTINUED | OUTPATIENT
Start: 2021-01-01 | End: 2021-01-01

## 2021-01-01 RX ORDER — DILTIAZEM HCL 120 MG
10 CAPSULE, EXT RELEASE 24 HR ORAL ONCE
Refills: 0 | Status: COMPLETED | OUTPATIENT
Start: 2021-01-01 | End: 2021-01-01

## 2021-01-01 RX ORDER — POTASSIUM CHLORIDE 20 MEQ
10 PACKET (EA) ORAL
Refills: 0 | Status: COMPLETED | OUTPATIENT
Start: 2021-01-01 | End: 2021-01-01

## 2021-01-01 RX ORDER — MIDAZOLAM HYDROCHLORIDE 1 MG/ML
4 INJECTION, SOLUTION INTRAMUSCULAR; INTRAVENOUS EVERY 4 HOURS
Refills: 0 | Status: DISCONTINUED | OUTPATIENT
Start: 2021-01-01 | End: 2021-01-01

## 2021-01-01 RX ORDER — DEXMEDETOMIDINE HYDROCHLORIDE IN 0.9% SODIUM CHLORIDE 4 UG/ML
0.4 INJECTION INTRAVENOUS
Qty: 200 | Refills: 0 | Status: DISCONTINUED | OUTPATIENT
Start: 2021-01-01 | End: 2021-01-01

## 2021-01-01 RX ORDER — CALCIUM GLUCONATE 100 MG/ML
2 VIAL (ML) INTRAVENOUS ONCE
Refills: 0 | Status: COMPLETED | OUTPATIENT
Start: 2021-01-01 | End: 2021-01-01

## 2021-01-01 RX ORDER — HYDROCORTISONE 20 MG
25 TABLET ORAL EVERY 8 HOURS
Refills: 0 | Status: COMPLETED | OUTPATIENT
Start: 2021-01-01 | End: 2021-01-01

## 2021-01-01 RX ORDER — PHENYLEPHRINE HYDROCHLORIDE 10 MG/ML
0.5 INJECTION INTRAVENOUS
Qty: 40 | Refills: 0 | Status: DISCONTINUED | OUTPATIENT
Start: 2021-01-01 | End: 2021-01-01

## 2021-01-01 RX ORDER — FENTANYL CITRATE 50 UG/ML
25 INJECTION INTRAVENOUS EVERY 8 HOURS
Refills: 0 | Status: DISCONTINUED | OUTPATIENT
Start: 2021-01-01 | End: 2021-01-01

## 2021-01-01 RX ORDER — SODIUM CHLORIDE 9 MG/ML
500 INJECTION, SOLUTION INTRAVENOUS
Refills: 0 | Status: DISCONTINUED | OUTPATIENT
Start: 2021-01-01 | End: 2021-01-01

## 2021-01-01 RX ORDER — AMIODARONE HYDROCHLORIDE 400 MG/1
0.5 TABLET ORAL
Qty: 900 | Refills: 0 | Status: DISCONTINUED | OUTPATIENT
Start: 2021-01-01 | End: 2021-01-01

## 2021-01-01 RX ORDER — DEXTROSE 50 % IN WATER 50 %
12.5 SYRINGE (ML) INTRAVENOUS ONCE
Refills: 0 | Status: DISCONTINUED | OUTPATIENT
Start: 2021-01-01 | End: 2021-01-01

## 2021-01-01 RX ORDER — DOXYCYCLINE HYCLATE 100 MG/1
100 CAPSULE ORAL
Qty: 20 | Refills: 0 | Status: ACTIVE | COMMUNITY
Start: 2021-01-01 | End: 1900-01-01

## 2021-01-01 RX ORDER — MORPHINE SULFATE 50 MG/1
4 CAPSULE, EXTENDED RELEASE ORAL ONCE
Refills: 0 | Status: DISCONTINUED | OUTPATIENT
Start: 2021-01-01 | End: 2021-01-01

## 2021-01-01 RX ORDER — ROCURONIUM BROMIDE 10 MG/ML
50 VIAL (ML) INTRAVENOUS ONCE
Refills: 0 | Status: COMPLETED | OUTPATIENT
Start: 2021-01-01 | End: 2021-01-01

## 2021-01-01 RX ORDER — FENTANYL CITRATE 50 UG/ML
0.5 INJECTION INTRAVENOUS
Qty: 2500 | Refills: 0 | Status: DISCONTINUED | OUTPATIENT
Start: 2021-01-01 | End: 2021-01-01

## 2021-01-01 RX ORDER — VASOPRESSIN 20 [USP'U]/ML
0.02 INJECTION INTRAVENOUS
Qty: 50 | Refills: 0 | Status: DISCONTINUED | OUTPATIENT
Start: 2021-01-01 | End: 2021-01-01

## 2021-01-01 RX ORDER — ACETAMINOPHEN 500 MG
650 TABLET ORAL ONCE
Refills: 0 | Status: COMPLETED | OUTPATIENT
Start: 2021-01-01 | End: 2021-01-01

## 2021-01-01 RX ORDER — SENNA PLUS 8.6 MG/1
5 TABLET ORAL DAILY
Refills: 0 | Status: DISCONTINUED | OUTPATIENT
Start: 2021-01-01 | End: 2021-01-01

## 2021-01-01 RX ORDER — ALBUTEROL 90 UG/1
2.5 AEROSOL, METERED ORAL EVERY 6 HOURS
Refills: 0 | Status: DISCONTINUED | OUTPATIENT
Start: 2021-01-01 | End: 2021-01-01

## 2021-01-01 RX ORDER — NOREPINEPHRINE BITARTRATE/D5W 8 MG/250ML
0.05 PLASTIC BAG, INJECTION (ML) INTRAVENOUS
Qty: 8 | Refills: 0 | Status: DISCONTINUED | OUTPATIENT
Start: 2021-01-01 | End: 2021-01-01

## 2021-01-01 RX ORDER — FENTANYL CITRATE 50 UG/ML
25 INJECTION INTRAVENOUS
Refills: 0 | Status: DISCONTINUED | OUTPATIENT
Start: 2021-01-01 | End: 2021-01-01

## 2021-01-01 RX ORDER — MIDODRINE HYDROCHLORIDE 2.5 MG/1
10 TABLET ORAL THREE TIMES A DAY
Refills: 0 | Status: DISCONTINUED | OUTPATIENT
Start: 2021-01-01 | End: 2021-01-01

## 2021-01-01 RX ORDER — SODIUM CHLORIDE 9 MG/ML
4 INJECTION INTRAMUSCULAR; INTRAVENOUS; SUBCUTANEOUS EVERY 6 HOURS
Refills: 0 | Status: COMPLETED | OUTPATIENT
Start: 2021-01-01 | End: 2021-01-01

## 2021-01-01 RX ORDER — HYDROCORTISONE 20 MG
100 TABLET ORAL EVERY 8 HOURS
Refills: 0 | Status: DISCONTINUED | OUTPATIENT
Start: 2021-01-01 | End: 2021-01-01

## 2021-01-01 RX ORDER — ATORVASTATIN CALCIUM 40 MG/1
40 TABLET, FILM COATED ORAL
Refills: 0 | Status: ACTIVE | COMMUNITY

## 2021-01-01 RX ORDER — OXYCODONE HYDROCHLORIDE 5 MG/1
5 TABLET ORAL EVERY 4 HOURS
Refills: 0 | Status: DISCONTINUED | OUTPATIENT
Start: 2021-01-01 | End: 2021-01-01

## 2021-01-01 RX ORDER — FENTANYL CITRATE 50 UG/ML
100 INJECTION INTRAVENOUS ONCE
Refills: 0 | Status: DISCONTINUED | OUTPATIENT
Start: 2021-01-01 | End: 2021-01-01

## 2021-01-01 RX ORDER — FENTANYL CITRATE 50 UG/ML
50 INJECTION INTRAVENOUS ONCE
Refills: 0 | Status: DISCONTINUED | OUTPATIENT
Start: 2021-01-01 | End: 2021-01-01

## 2021-01-01 RX ORDER — NOREPINEPHRINE BITARTRATE/D5W 8 MG/250ML
0.05 PLASTIC BAG, INJECTION (ML) INTRAVENOUS
Qty: 32 | Refills: 0 | Status: DISCONTINUED | OUTPATIENT
Start: 2021-01-01 | End: 2021-01-01

## 2021-01-01 RX ORDER — CEFAZOLIN SODIUM 1 G
2000 VIAL (EA) INJECTION EVERY 8 HOURS
Refills: 0 | Status: DISCONTINUED | OUTPATIENT
Start: 2021-01-01 | End: 2021-01-01

## 2021-01-01 RX ORDER — OXYCODONE HYDROCHLORIDE 5 MG/1
5 TABLET ORAL EVERY 6 HOURS
Refills: 0 | Status: DISCONTINUED | OUTPATIENT
Start: 2021-01-01 | End: 2021-01-01

## 2021-01-01 RX ORDER — MEROPENEM 1 G/30ML
500 INJECTION INTRAVENOUS ONCE
Refills: 0 | Status: COMPLETED | OUTPATIENT
Start: 2021-01-01 | End: 2021-01-01

## 2021-01-01 RX ORDER — TETANUS TOXOID, REDUCED DIPHTHERIA TOXOID AND ACELLULAR PERTUSSIS VACCINE, ADSORBED 5; 2.5; 8; 8; 2.5 [IU]/.5ML; [IU]/.5ML; UG/.5ML; UG/.5ML; UG/.5ML
0.5 SUSPENSION INTRAMUSCULAR ONCE
Refills: 0 | Status: COMPLETED | OUTPATIENT
Start: 2021-01-01 | End: 2021-01-01

## 2021-01-01 RX ORDER — POTASSIUM CHLORIDE 20 MEQ
40 PACKET (EA) ORAL ONCE
Refills: 0 | Status: COMPLETED | OUTPATIENT
Start: 2021-01-01 | End: 2021-01-01

## 2021-01-01 RX ORDER — ACETAMINOPHEN 500 MG
650 TABLET ORAL EVERY 6 HOURS
Refills: 0 | Status: DISCONTINUED | OUTPATIENT
Start: 2021-01-01 | End: 2021-01-01

## 2021-01-01 RX ORDER — DEXTROSE 50 % IN WATER 50 %
25 SYRINGE (ML) INTRAVENOUS ONCE
Refills: 0 | Status: DISCONTINUED | OUTPATIENT
Start: 2021-01-01 | End: 2021-01-01

## 2021-01-01 RX ORDER — PROPOFOL 10 MG/ML
5 INJECTION, EMULSION INTRAVENOUS
Qty: 1000 | Refills: 0 | Status: DISCONTINUED | OUTPATIENT
Start: 2021-01-01 | End: 2021-01-01

## 2021-01-01 RX ORDER — KETAMINE HYDROCHLORIDE 100 MG/ML
0.25 INJECTION INTRAMUSCULAR; INTRAVENOUS
Qty: 1000 | Refills: 0 | Status: DISCONTINUED | OUTPATIENT
Start: 2021-01-01 | End: 2021-01-01

## 2021-01-01 RX ORDER — SODIUM CHLORIDE 9 MG/ML
4 INJECTION INTRAMUSCULAR; INTRAVENOUS; SUBCUTANEOUS EVERY 6 HOURS
Refills: 0 | Status: DISCONTINUED | OUTPATIENT
Start: 2021-01-01 | End: 2021-01-01

## 2021-01-01 RX ORDER — FENTANYL CITRATE 50 UG/ML
50 INJECTION INTRAVENOUS EVERY 4 HOURS
Refills: 0 | Status: DISCONTINUED | OUTPATIENT
Start: 2021-01-01 | End: 2021-01-01

## 2021-01-01 RX ORDER — POLYETHYLENE GLYCOL 3350 17 G/17G
17 POWDER, FOR SOLUTION ORAL DAILY
Refills: 0 | Status: DISCONTINUED | OUTPATIENT
Start: 2021-01-01 | End: 2021-01-01

## 2021-01-01 RX ORDER — PROPOFOL 10 MG/ML
10 INJECTION, EMULSION INTRAVENOUS
Qty: 1000 | Refills: 0 | Status: DISCONTINUED | OUTPATIENT
Start: 2021-01-01 | End: 2021-01-01

## 2021-01-01 RX ORDER — MIDODRINE HYDROCHLORIDE 2.5 MG/1
1 TABLET ORAL
Qty: 0 | Refills: 0 | DISCHARGE

## 2021-01-01 RX ORDER — PIPERACILLIN AND TAZOBACTAM 4; .5 G/20ML; G/20ML
3.38 INJECTION, POWDER, LYOPHILIZED, FOR SOLUTION INTRAVENOUS ONCE
Refills: 0 | Status: COMPLETED | OUTPATIENT
Start: 2021-01-01 | End: 2021-01-01

## 2021-01-01 RX ORDER — AMIODARONE HYDROCHLORIDE 400 MG/1
1 TABLET ORAL
Qty: 900 | Refills: 0 | Status: DISCONTINUED | OUTPATIENT
Start: 2021-01-01 | End: 2021-01-01

## 2021-01-01 RX ORDER — MEROPENEM 1 G/30ML
500 INJECTION INTRAVENOUS EVERY 24 HOURS
Refills: 0 | Status: DISCONTINUED | OUTPATIENT
Start: 2021-01-01 | End: 2021-01-01

## 2021-01-01 RX ORDER — SODIUM CHLORIDE 9 MG/ML
250 INJECTION INTRAMUSCULAR; INTRAVENOUS; SUBCUTANEOUS ONCE
Refills: 0 | Status: COMPLETED | OUTPATIENT
Start: 2021-01-01 | End: 2021-01-01

## 2021-01-01 RX ORDER — MOMETASONE FUROATE AND FORMOTEROL FUMARATE DIHYDRATE 200; 5 UG/1; UG/1
2 AEROSOL RESPIRATORY (INHALATION)
Qty: 0 | Refills: 0 | DISCHARGE

## 2021-01-01 RX ORDER — HYDRALAZINE HCL 50 MG
10 TABLET ORAL EVERY 4 HOURS
Refills: 0 | Status: DISCONTINUED | OUTPATIENT
Start: 2021-01-01 | End: 2021-01-01

## 2021-01-01 RX ORDER — FENTANYL CITRATE 50 UG/ML
25 INJECTION INTRAVENOUS ONCE
Refills: 0 | Status: DISCONTINUED | OUTPATIENT
Start: 2021-01-01 | End: 2021-01-01

## 2021-01-01 RX ORDER — FENTANYL CITRATE 50 UG/ML
25 INJECTION INTRAVENOUS EVERY 6 HOURS
Refills: 0 | Status: DISCONTINUED | OUTPATIENT
Start: 2021-01-01 | End: 2021-01-01

## 2021-01-01 RX ORDER — ALBUMIN HUMAN 25 %
500 VIAL (ML) INTRAVENOUS ONCE
Refills: 0 | Status: COMPLETED | OUTPATIENT
Start: 2021-01-01 | End: 2021-01-01

## 2021-01-01 RX ORDER — ALBUMIN HUMAN 25 %
100 VIAL (ML) INTRAVENOUS ONCE
Refills: 0 | Status: COMPLETED | OUTPATIENT
Start: 2021-01-01 | End: 2021-01-01

## 2021-01-01 RX ORDER — GLUCAGON INJECTION, SOLUTION 0.5 MG/.1ML
1 INJECTION, SOLUTION SUBCUTANEOUS ONCE
Refills: 0 | Status: DISCONTINUED | OUTPATIENT
Start: 2021-01-01 | End: 2021-01-01

## 2021-01-01 RX ORDER — AMIODARONE HYDROCHLORIDE 400 MG/1
200 TABLET ORAL DAILY
Refills: 0 | Status: DISCONTINUED | OUTPATIENT
Start: 2021-01-01 | End: 2021-01-01

## 2021-01-01 RX ORDER — ALBUMIN HUMAN 25 %
50 VIAL (ML) INTRAVENOUS ONCE
Refills: 0 | Status: COMPLETED | OUTPATIENT
Start: 2021-01-01 | End: 2021-01-01

## 2021-01-01 RX ORDER — MIDAZOLAM HYDROCHLORIDE 1 MG/ML
1 INJECTION, SOLUTION INTRAMUSCULAR; INTRAVENOUS ONCE
Refills: 0 | Status: DISCONTINUED | OUTPATIENT
Start: 2021-01-01 | End: 2021-01-01

## 2021-01-01 RX ORDER — SODIUM CHLORIDE 9 MG/ML
1000 INJECTION INTRAMUSCULAR; INTRAVENOUS; SUBCUTANEOUS ONCE
Refills: 0 | Status: COMPLETED | OUTPATIENT
Start: 2021-01-01 | End: 2021-01-01

## 2021-01-01 RX ORDER — ONDANSETRON 8 MG/1
4 TABLET, FILM COATED ORAL ONCE
Refills: 0 | Status: DISCONTINUED | OUTPATIENT
Start: 2021-01-01 | End: 2021-01-01

## 2021-01-01 RX ORDER — CALCITRIOL 0.5 UG/1
1 CAPSULE ORAL
Qty: 0 | Refills: 0 | DISCHARGE

## 2021-01-01 RX ORDER — ATORVASTATIN CALCIUM 80 MG/1
40 TABLET, FILM COATED ORAL ONCE
Refills: 0 | Status: COMPLETED | OUTPATIENT
Start: 2021-01-01 | End: 2021-01-01

## 2021-01-01 RX ADMIN — Medication 650 MILLIGRAM(S): at 23:50

## 2021-01-01 RX ADMIN — Medication 50 MILLILITER(S): at 22:47

## 2021-01-01 RX ADMIN — PROPOFOL 1.94 MICROGRAM(S)/KG/MIN: 10 INJECTION, EMULSION INTRAVENOUS at 02:05

## 2021-01-01 RX ADMIN — KETAMINE HYDROCHLORIDE 1.62 MG/KG/HR: 100 INJECTION INTRAMUSCULAR; INTRAVENOUS at 02:11

## 2021-01-01 RX ADMIN — PANTOPRAZOLE SODIUM 40 MILLIGRAM(S): 20 TABLET, DELAYED RELEASE ORAL at 14:11

## 2021-01-01 RX ADMIN — CHLORHEXIDINE GLUCONATE 1 APPLICATION(S): 213 SOLUTION TOPICAL at 05:53

## 2021-01-01 RX ADMIN — PIPERACILLIN AND TAZOBACTAM 25 GRAM(S): 4; .5 INJECTION, POWDER, LYOPHILIZED, FOR SOLUTION INTRAVENOUS at 12:17

## 2021-01-01 RX ADMIN — SODIUM CHLORIDE 4 MILLILITER(S): 9 INJECTION INTRAMUSCULAR; INTRAVENOUS; SUBCUTANEOUS at 11:23

## 2021-01-01 RX ADMIN — CHLORHEXIDINE GLUCONATE 1 APPLICATION(S): 213 SOLUTION TOPICAL at 06:39

## 2021-01-01 RX ADMIN — SENNA PLUS 5 MILLILITER(S): 8.6 TABLET ORAL at 21:01

## 2021-01-01 RX ADMIN — CHLORHEXIDINE GLUCONATE 15 MILLILITER(S): 213 SOLUTION TOPICAL at 05:21

## 2021-01-01 RX ADMIN — AMIODARONE HYDROCHLORIDE 200 MILLIGRAM(S): 400 TABLET ORAL at 05:58

## 2021-01-01 RX ADMIN — CHLORHEXIDINE GLUCONATE 15 MILLILITER(S): 213 SOLUTION TOPICAL at 05:11

## 2021-01-01 RX ADMIN — AMIODARONE HYDROCHLORIDE 200 MILLIGRAM(S): 400 TABLET ORAL at 05:28

## 2021-01-01 RX ADMIN — PANTOPRAZOLE SODIUM 40 MILLIGRAM(S): 20 TABLET, DELAYED RELEASE ORAL at 05:15

## 2021-01-01 RX ADMIN — ALBUTEROL 2.5 MILLIGRAM(S): 90 AEROSOL, METERED ORAL at 11:23

## 2021-01-01 RX ADMIN — CHLORHEXIDINE GLUCONATE 1 APPLICATION(S): 213 SOLUTION TOPICAL at 05:17

## 2021-01-01 RX ADMIN — MIDAZOLAM HYDROCHLORIDE 4 MILLIGRAM(S): 1 INJECTION, SOLUTION INTRAMUSCULAR; INTRAVENOUS at 09:35

## 2021-01-01 RX ADMIN — Medication 400 MILLIGRAM(S): at 00:12

## 2021-01-01 RX ADMIN — PIPERACILLIN AND TAZOBACTAM 25 GRAM(S): 4; .5 INJECTION, POWDER, LYOPHILIZED, FOR SOLUTION INTRAVENOUS at 05:48

## 2021-01-01 RX ADMIN — SODIUM CHLORIDE 4 MILLILITER(S): 9 INJECTION INTRAMUSCULAR; INTRAVENOUS; SUBCUTANEOUS at 11:37

## 2021-01-01 RX ADMIN — MIDODRINE HYDROCHLORIDE 15 MILLIGRAM(S): 2.5 TABLET ORAL at 05:47

## 2021-01-01 RX ADMIN — OXYCODONE HYDROCHLORIDE 5 MILLIGRAM(S): 5 TABLET ORAL at 10:14

## 2021-01-01 RX ADMIN — Medication 650 MILLIGRAM(S): at 01:04

## 2021-01-01 RX ADMIN — CHLORHEXIDINE GLUCONATE 1 APPLICATION(S): 213 SOLUTION TOPICAL at 11:28

## 2021-01-01 RX ADMIN — PANTOPRAZOLE SODIUM 40 MILLIGRAM(S): 20 TABLET, DELAYED RELEASE ORAL at 17:12

## 2021-01-01 RX ADMIN — Medication 2: at 11:28

## 2021-01-01 RX ADMIN — MIDODRINE HYDROCHLORIDE 10 MILLIGRAM(S): 2.5 TABLET ORAL at 06:38

## 2021-01-01 RX ADMIN — ALBUTEROL 2.5 MILLIGRAM(S): 90 AEROSOL, METERED ORAL at 17:25

## 2021-01-01 RX ADMIN — SODIUM CHLORIDE 4 MILLILITER(S): 9 INJECTION INTRAMUSCULAR; INTRAVENOUS; SUBCUTANEOUS at 23:51

## 2021-01-01 RX ADMIN — MIDODRINE HYDROCHLORIDE 5 MILLIGRAM(S): 2.5 TABLET ORAL at 05:11

## 2021-01-01 RX ADMIN — ALBUTEROL 2.5 MILLIGRAM(S): 90 AEROSOL, METERED ORAL at 05:49

## 2021-01-01 RX ADMIN — CHLORHEXIDINE GLUCONATE 15 MILLILITER(S): 213 SOLUTION TOPICAL at 17:21

## 2021-01-01 RX ADMIN — PANTOPRAZOLE SODIUM 40 MILLIGRAM(S): 20 TABLET, DELAYED RELEASE ORAL at 11:35

## 2021-01-01 RX ADMIN — MIDODRINE HYDROCHLORIDE 5 MILLIGRAM(S): 2.5 TABLET ORAL at 05:16

## 2021-01-01 RX ADMIN — Medication 650 MILLIGRAM(S): at 17:25

## 2021-01-01 RX ADMIN — Medication 25 GRAM(S): at 12:09

## 2021-01-01 RX ADMIN — POLYETHYLENE GLYCOL 3350 17 GRAM(S): 17 POWDER, FOR SOLUTION ORAL at 12:34

## 2021-01-01 RX ADMIN — Medication 100 MILLIGRAM(S): at 21:20

## 2021-01-01 RX ADMIN — POLYETHYLENE GLYCOL 3350 17 GRAM(S): 17 POWDER, FOR SOLUTION ORAL at 11:28

## 2021-01-01 RX ADMIN — CHLORHEXIDINE GLUCONATE 1 APPLICATION(S): 213 SOLUTION TOPICAL at 05:24

## 2021-01-01 RX ADMIN — Medication 25 MILLIGRAM(S): at 13:58

## 2021-01-01 RX ADMIN — Medication 100 MILLIGRAM(S): at 12:28

## 2021-01-01 RX ADMIN — CHLORHEXIDINE GLUCONATE 15 MILLILITER(S): 213 SOLUTION TOPICAL at 18:16

## 2021-01-01 RX ADMIN — PROPOFOL 3.88 MICROGRAM(S)/KG/MIN: 10 INJECTION, EMULSION INTRAVENOUS at 22:46

## 2021-01-01 RX ADMIN — Medication 100 MILLIGRAM(S): at 05:43

## 2021-01-01 RX ADMIN — MIDODRINE HYDROCHLORIDE 15 MILLIGRAM(S): 2.5 TABLET ORAL at 20:59

## 2021-01-01 RX ADMIN — Medication 50 MILLILITER(S): at 00:03

## 2021-01-01 RX ADMIN — PANTOPRAZOLE SODIUM 40 MILLIGRAM(S): 20 TABLET, DELAYED RELEASE ORAL at 17:21

## 2021-01-01 RX ADMIN — SODIUM CHLORIDE 4 MILLILITER(S): 9 INJECTION INTRAMUSCULAR; INTRAVENOUS; SUBCUTANEOUS at 23:19

## 2021-01-01 RX ADMIN — PIPERACILLIN AND TAZOBACTAM 25 GRAM(S): 4; .5 INJECTION, POWDER, LYOPHILIZED, FOR SOLUTION INTRAVENOUS at 05:24

## 2021-01-01 RX ADMIN — Medication 10 MILLIGRAM(S): at 17:58

## 2021-01-01 RX ADMIN — POLYETHYLENE GLYCOL 3350 17 GRAM(S): 17 POWDER, FOR SOLUTION ORAL at 12:19

## 2021-01-01 RX ADMIN — SODIUM CHLORIDE 4 MILLILITER(S): 9 INJECTION INTRAMUSCULAR; INTRAVENOUS; SUBCUTANEOUS at 00:45

## 2021-01-01 RX ADMIN — Medication 100 MILLIGRAM(S): at 21:13

## 2021-01-01 RX ADMIN — MIDODRINE HYDROCHLORIDE 10 MILLIGRAM(S): 2.5 TABLET ORAL at 05:28

## 2021-01-01 RX ADMIN — FENTANYL CITRATE 25 MICROGRAM(S): 50 INJECTION INTRAVENOUS at 16:10

## 2021-01-01 RX ADMIN — Medication 3.03 MICROGRAM(S)/KG/MIN: at 03:27

## 2021-01-01 RX ADMIN — TETANUS TOXOID, REDUCED DIPHTHERIA TOXOID AND ACELLULAR PERTUSSIS VACCINE, ADSORBED 0.5 MILLILITER(S): 5; 2.5; 8; 8; 2.5 SUSPENSION INTRAMUSCULAR at 16:58

## 2021-01-01 RX ADMIN — CHLORHEXIDINE GLUCONATE 1 APPLICATION(S): 213 SOLUTION TOPICAL at 11:34

## 2021-01-01 RX ADMIN — KETAMINE HYDROCHLORIDE 1.62 MG/KG/HR: 100 INJECTION INTRAMUSCULAR; INTRAVENOUS at 14:16

## 2021-01-01 RX ADMIN — Medication 40 MILLIEQUIVALENT(S): at 05:58

## 2021-01-01 RX ADMIN — CHLORHEXIDINE GLUCONATE 15 MILLILITER(S): 213 SOLUTION TOPICAL at 17:12

## 2021-01-01 RX ADMIN — AMIODARONE HYDROCHLORIDE 200 MILLIGRAM(S): 400 TABLET ORAL at 15:59

## 2021-01-01 RX ADMIN — Medication 250 MILLILITER(S): at 14:28

## 2021-01-01 RX ADMIN — DEXMEDETOMIDINE HYDROCHLORIDE IN 0.9% SODIUM CHLORIDE 6.47 MICROGRAM(S)/KG/HR: 4 INJECTION INTRAVENOUS at 03:42

## 2021-01-01 RX ADMIN — Medication 25 MILLIGRAM(S): at 05:15

## 2021-01-01 RX ADMIN — Medication 2: at 11:58

## 2021-01-01 RX ADMIN — KETAMINE HYDROCHLORIDE 1.62 MG/KG/HR: 100 INJECTION INTRAMUSCULAR; INTRAVENOUS at 02:12

## 2021-01-01 RX ADMIN — CHLORHEXIDINE GLUCONATE 15 MILLILITER(S): 213 SOLUTION TOPICAL at 05:43

## 2021-01-01 RX ADMIN — Medication 25 MILLILITER(S): at 12:03

## 2021-01-01 RX ADMIN — MIDAZOLAM HYDROCHLORIDE 1 MILLIGRAM(S): 1 INJECTION, SOLUTION INTRAMUSCULAR; INTRAVENOUS at 22:48

## 2021-01-01 RX ADMIN — MIDODRINE HYDROCHLORIDE 10 MILLIGRAM(S): 2.5 TABLET ORAL at 21:18

## 2021-01-01 RX ADMIN — Medication 40 MILLIGRAM(S): at 16:21

## 2021-01-01 RX ADMIN — MIDAZOLAM HYDROCHLORIDE 2 MILLIGRAM(S): 1 INJECTION, SOLUTION INTRAMUSCULAR; INTRAVENOUS at 23:45

## 2021-01-01 RX ADMIN — CHLORHEXIDINE GLUCONATE 15 MILLILITER(S): 213 SOLUTION TOPICAL at 05:04

## 2021-01-01 RX ADMIN — MIDAZOLAM HYDROCHLORIDE 4 MILLIGRAM(S): 1 INJECTION, SOLUTION INTRAMUSCULAR; INTRAVENOUS at 13:13

## 2021-01-01 RX ADMIN — PANTOPRAZOLE SODIUM 40 MILLIGRAM(S): 20 TABLET, DELAYED RELEASE ORAL at 11:28

## 2021-01-01 RX ADMIN — Medication 1 APPLICATION(S): at 05:12

## 2021-01-01 RX ADMIN — ALBUTEROL 2.5 MILLIGRAM(S): 90 AEROSOL, METERED ORAL at 05:45

## 2021-01-01 RX ADMIN — Medication 10 MILLIGRAM(S): at 21:34

## 2021-01-01 RX ADMIN — Medication 2: at 00:51

## 2021-01-01 RX ADMIN — PHENYLEPHRINE HYDROCHLORIDE 12.1 MICROGRAM(S)/KG/MIN: 10 INJECTION INTRAVENOUS at 15:12

## 2021-01-01 RX ADMIN — Medication 3.03 MICROGRAM(S)/KG/MIN: at 02:30

## 2021-01-01 RX ADMIN — Medication 50 MILLIGRAM(S): at 13:26

## 2021-01-01 RX ADMIN — MIDODRINE HYDROCHLORIDE 2.5 MILLIGRAM(S): 2.5 TABLET ORAL at 11:37

## 2021-01-01 RX ADMIN — Medication 10 MILLIGRAM(S): at 19:19

## 2021-01-01 RX ADMIN — FENTANYL CITRATE 50 MICROGRAM(S): 50 INJECTION INTRAVENOUS at 01:52

## 2021-01-01 RX ADMIN — ALBUTEROL 2.5 MILLIGRAM(S): 90 AEROSOL, METERED ORAL at 05:41

## 2021-01-01 RX ADMIN — Medication 100 MILLIGRAM(S): at 17:59

## 2021-01-01 RX ADMIN — Medication 3.03 MICROGRAM(S)/KG/MIN: at 10:59

## 2021-01-01 RX ADMIN — Medication 100 MEQ/KG/HR: at 03:13

## 2021-01-01 RX ADMIN — ATORVASTATIN CALCIUM 40 MILLIGRAM(S): 80 TABLET, FILM COATED ORAL at 23:49

## 2021-01-01 RX ADMIN — PANTOPRAZOLE SODIUM 40 MILLIGRAM(S): 20 TABLET, DELAYED RELEASE ORAL at 11:34

## 2021-01-01 RX ADMIN — ALBUTEROL 2.5 MILLIGRAM(S): 90 AEROSOL, METERED ORAL at 23:51

## 2021-01-01 RX ADMIN — MIDAZOLAM HYDROCHLORIDE 4 MILLIGRAM(S): 1 INJECTION, SOLUTION INTRAMUSCULAR; INTRAVENOUS at 15:54

## 2021-01-01 RX ADMIN — PHENYLEPHRINE HYDROCHLORIDE 12.1 MICROGRAM(S)/KG/MIN: 10 INJECTION INTRAVENOUS at 08:43

## 2021-01-01 RX ADMIN — Medication 50 MILLILITER(S): at 17:06

## 2021-01-01 RX ADMIN — Medication 25 MILLIGRAM(S): at 13:52

## 2021-01-01 RX ADMIN — MIDODRINE HYDROCHLORIDE 15 MILLIGRAM(S): 2.5 TABLET ORAL at 12:05

## 2021-01-01 RX ADMIN — SENNA PLUS 5 MILLILITER(S): 8.6 TABLET ORAL at 13:40

## 2021-01-01 RX ADMIN — ALBUTEROL 2.5 MILLIGRAM(S): 90 AEROSOL, METERED ORAL at 17:16

## 2021-01-01 RX ADMIN — Medication 100 MILLIEQUIVALENT(S): at 05:57

## 2021-01-01 RX ADMIN — CHLORHEXIDINE GLUCONATE 1 APPLICATION(S): 213 SOLUTION TOPICAL at 10:39

## 2021-01-01 RX ADMIN — PROPOFOL 3.88 MICROGRAM(S)/KG/MIN: 10 INJECTION, EMULSION INTRAVENOUS at 09:45

## 2021-01-01 RX ADMIN — PIPERACILLIN AND TAZOBACTAM 25 GRAM(S): 4; .5 INJECTION, POWDER, LYOPHILIZED, FOR SOLUTION INTRAVENOUS at 17:37

## 2021-01-01 RX ADMIN — Medication 3.03 MICROGRAM(S)/KG/MIN: at 18:09

## 2021-01-01 RX ADMIN — PHENYLEPHRINE HYDROCHLORIDE 12.1 MICROGRAM(S)/KG/MIN: 10 INJECTION INTRAVENOUS at 11:00

## 2021-01-01 RX ADMIN — CHLORHEXIDINE GLUCONATE 15 MILLILITER(S): 213 SOLUTION TOPICAL at 05:56

## 2021-01-01 RX ADMIN — Medication 5 MILLIGRAM(S): at 03:10

## 2021-01-01 RX ADMIN — CHLORHEXIDINE GLUCONATE 1 APPLICATION(S): 213 SOLUTION TOPICAL at 04:10

## 2021-01-01 RX ADMIN — SENNA PLUS 5 MILLILITER(S): 8.6 TABLET ORAL at 12:19

## 2021-01-01 RX ADMIN — SODIUM CHLORIDE 4 MILLILITER(S): 9 INJECTION INTRAMUSCULAR; INTRAVENOUS; SUBCUTANEOUS at 05:17

## 2021-01-01 RX ADMIN — PIPERACILLIN AND TAZOBACTAM 25 GRAM(S): 4; .5 INJECTION, POWDER, LYOPHILIZED, FOR SOLUTION INTRAVENOUS at 12:02

## 2021-01-01 RX ADMIN — Medication 6.07 MICROGRAM(S)/KG/MIN: at 00:33

## 2021-01-01 RX ADMIN — MIDODRINE HYDROCHLORIDE 5 MILLIGRAM(S): 2.5 TABLET ORAL at 05:58

## 2021-01-01 RX ADMIN — Medication 100 MILLIGRAM(S): at 06:23

## 2021-01-01 RX ADMIN — PIPERACILLIN AND TAZOBACTAM 25 GRAM(S): 4; .5 INJECTION, POWDER, LYOPHILIZED, FOR SOLUTION INTRAVENOUS at 17:46

## 2021-01-01 RX ADMIN — FENTANYL CITRATE 25 MICROGRAM(S): 50 INJECTION INTRAVENOUS at 06:01

## 2021-01-01 RX ADMIN — PANTOPRAZOLE SODIUM 40 MILLIGRAM(S): 20 TABLET, DELAYED RELEASE ORAL at 11:22

## 2021-01-01 RX ADMIN — KETAMINE HYDROCHLORIDE 1.62 MG/KG/HR: 100 INJECTION INTRAMUSCULAR; INTRAVENOUS at 11:13

## 2021-01-01 RX ADMIN — Medication 100 MILLIGRAM(S): at 05:57

## 2021-01-01 RX ADMIN — Medication 200 GRAM(S): at 09:56

## 2021-01-01 RX ADMIN — PHENYLEPHRINE HYDROCHLORIDE 12.1 MICROGRAM(S)/KG/MIN: 10 INJECTION INTRAVENOUS at 00:46

## 2021-01-01 RX ADMIN — DESMOPRESSIN ACETATE 220 MICROGRAM(S): 0.1 TABLET ORAL at 10:15

## 2021-01-01 RX ADMIN — Medication 50 MILLIGRAM(S): at 05:02

## 2021-01-01 RX ADMIN — FENTANYL CITRATE 100 MICROGRAM(S): 50 INJECTION INTRAVENOUS at 01:03

## 2021-01-01 RX ADMIN — Medication 10 MILLIGRAM(S): at 22:18

## 2021-01-01 RX ADMIN — PANTOPRAZOLE SODIUM 40 MILLIGRAM(S): 20 TABLET, DELAYED RELEASE ORAL at 12:49

## 2021-01-01 RX ADMIN — PROPOFOL 3.88 MICROGRAM(S)/KG/MIN: 10 INJECTION, EMULSION INTRAVENOUS at 15:00

## 2021-01-01 RX ADMIN — KETAMINE HYDROCHLORIDE 1.62 MG/KG/HR: 100 INJECTION INTRAMUSCULAR; INTRAVENOUS at 06:38

## 2021-01-01 RX ADMIN — FENTANYL CITRATE 3.24 MICROGRAM(S)/KG/HR: 50 INJECTION INTRAVENOUS at 23:45

## 2021-01-01 RX ADMIN — Medication 2: at 01:49

## 2021-01-01 RX ADMIN — Medication 500 MILLIGRAM(S): at 05:47

## 2021-01-01 RX ADMIN — SODIUM CHLORIDE 75 MILLILITER(S): 9 INJECTION INTRAMUSCULAR; INTRAVENOUS; SUBCUTANEOUS at 01:34

## 2021-01-01 RX ADMIN — BUDESONIDE AND FORMOTEROL FUMARATE DIHYDRATE 2 PUFF(S): 160; 4.5 AEROSOL RESPIRATORY (INHALATION) at 18:38

## 2021-01-01 RX ADMIN — SODIUM CHLORIDE 4 MILLILITER(S): 9 INJECTION INTRAMUSCULAR; INTRAVENOUS; SUBCUTANEOUS at 05:49

## 2021-01-01 RX ADMIN — SENNA PLUS 5 MILLILITER(S): 8.6 TABLET ORAL at 11:28

## 2021-01-01 RX ADMIN — PIPERACILLIN AND TAZOBACTAM 25 GRAM(S): 4; .5 INJECTION, POWDER, LYOPHILIZED, FOR SOLUTION INTRAVENOUS at 00:34

## 2021-01-01 RX ADMIN — DEXMEDETOMIDINE HYDROCHLORIDE IN 0.9% SODIUM CHLORIDE 6.47 MICROGRAM(S)/KG/HR: 4 INJECTION INTRAVENOUS at 05:58

## 2021-01-01 RX ADMIN — FENTANYL CITRATE 50 MICROGRAM(S): 50 INJECTION INTRAVENOUS at 10:51

## 2021-01-01 RX ADMIN — POLYETHYLENE GLYCOL 3350 17 GRAM(S): 17 POWDER, FOR SOLUTION ORAL at 11:33

## 2021-01-01 RX ADMIN — Medication 650 MILLIGRAM(S): at 06:03

## 2021-01-01 RX ADMIN — SODIUM CHLORIDE 1000 MILLILITER(S): 9 INJECTION, SOLUTION INTRAVENOUS at 05:24

## 2021-01-01 RX ADMIN — Medication 50 MILLIGRAM(S): at 14:12

## 2021-01-01 RX ADMIN — SODIUM CHLORIDE 1000 MILLILITER(S): 9 INJECTION, SOLUTION INTRAVENOUS at 11:29

## 2021-01-01 RX ADMIN — KETAMINE HYDROCHLORIDE 1.62 MG/KG/HR: 100 INJECTION INTRAMUSCULAR; INTRAVENOUS at 18:12

## 2021-01-01 RX ADMIN — PROPOFOL 3.88 MICROGRAM(S)/KG/MIN: 10 INJECTION, EMULSION INTRAVENOUS at 04:08

## 2021-01-01 RX ADMIN — Medication 650 MILLIGRAM(S): at 22:01

## 2021-01-01 RX ADMIN — Medication 2: at 23:14

## 2021-01-01 RX ADMIN — Medication 25 MILLIGRAM(S): at 22:17

## 2021-01-01 RX ADMIN — Medication 10 MILLIGRAM(S): at 20:57

## 2021-01-01 RX ADMIN — Medication 100 MILLIEQUIVALENT(S): at 06:53

## 2021-01-01 RX ADMIN — Medication 1 APPLICATION(S): at 18:17

## 2021-01-01 RX ADMIN — Medication 2: at 17:25

## 2021-01-01 RX ADMIN — CHLORHEXIDINE GLUCONATE 1 APPLICATION(S): 213 SOLUTION TOPICAL at 11:35

## 2021-01-01 RX ADMIN — CHLORHEXIDINE GLUCONATE 15 MILLILITER(S): 213 SOLUTION TOPICAL at 17:19

## 2021-01-01 RX ADMIN — SODIUM CHLORIDE 4 MILLILITER(S): 9 INJECTION INTRAMUSCULAR; INTRAVENOUS; SUBCUTANEOUS at 05:40

## 2021-01-01 RX ADMIN — OXYCODONE HYDROCHLORIDE 5 MILLIGRAM(S): 5 TABLET ORAL at 09:44

## 2021-01-01 RX ADMIN — MIDODRINE HYDROCHLORIDE 5 MILLIGRAM(S): 2.5 TABLET ORAL at 14:31

## 2021-01-01 RX ADMIN — PHENYLEPHRINE HYDROCHLORIDE 12.1 MICROGRAM(S)/KG/MIN: 10 INJECTION INTRAVENOUS at 17:24

## 2021-01-01 RX ADMIN — Medication 10 MILLIGRAM(S): at 22:17

## 2021-01-01 RX ADMIN — PROPOFOL 3.88 MICROGRAM(S)/KG/MIN: 10 INJECTION, EMULSION INTRAVENOUS at 00:49

## 2021-01-01 RX ADMIN — CHLORHEXIDINE GLUCONATE 15 MILLILITER(S): 213 SOLUTION TOPICAL at 05:16

## 2021-01-01 RX ADMIN — Medication 1 APPLICATION(S): at 17:18

## 2021-01-01 RX ADMIN — PROPOFOL 60 MILLIGRAM(S): 10 INJECTION, EMULSION INTRAVENOUS at 09:35

## 2021-01-01 RX ADMIN — FENTANYL CITRATE 50 MICROGRAM(S): 50 INJECTION INTRAVENOUS at 11:40

## 2021-01-01 RX ADMIN — PANTOPRAZOLE SODIUM 40 MILLIGRAM(S): 20 TABLET, DELAYED RELEASE ORAL at 11:26

## 2021-01-01 RX ADMIN — PIPERACILLIN AND TAZOBACTAM 25 GRAM(S): 4; .5 INJECTION, POWDER, LYOPHILIZED, FOR SOLUTION INTRAVENOUS at 05:57

## 2021-01-01 RX ADMIN — FENTANYL CITRATE 50 MICROGRAM(S): 50 INJECTION INTRAVENOUS at 14:34

## 2021-01-01 RX ADMIN — CHLORHEXIDINE GLUCONATE 15 MILLILITER(S): 213 SOLUTION TOPICAL at 17:18

## 2021-01-01 RX ADMIN — Medication 25 MILLIGRAM(S): at 22:44

## 2021-01-01 RX ADMIN — MORPHINE SULFATE 4 MILLIGRAM(S): 50 CAPSULE, EXTENDED RELEASE ORAL at 16:58

## 2021-01-01 RX ADMIN — Medication 2: at 06:02

## 2021-01-01 RX ADMIN — Medication 1 APPLICATION(S): at 17:05

## 2021-01-01 RX ADMIN — AMIODARONE HYDROCHLORIDE 200 MILLIGRAM(S): 400 TABLET ORAL at 06:35

## 2021-01-01 RX ADMIN — Medication 50 MILLIGRAM(S): at 21:01

## 2021-01-01 RX ADMIN — SODIUM CHLORIDE 4 MILLILITER(S): 9 INJECTION INTRAMUSCULAR; INTRAVENOUS; SUBCUTANEOUS at 17:08

## 2021-01-01 RX ADMIN — SODIUM CHLORIDE 4 MILLILITER(S): 9 INJECTION INTRAMUSCULAR; INTRAVENOUS; SUBCUTANEOUS at 05:45

## 2021-01-01 RX ADMIN — ALBUTEROL 2.5 MILLIGRAM(S): 90 AEROSOL, METERED ORAL at 11:49

## 2021-01-01 RX ADMIN — FENTANYL CITRATE 25 MICROGRAM(S): 50 INJECTION INTRAVENOUS at 16:15

## 2021-01-01 RX ADMIN — CHLORHEXIDINE GLUCONATE 1 APPLICATION(S): 213 SOLUTION TOPICAL at 08:23

## 2021-01-01 RX ADMIN — SENNA PLUS 5 MILLILITER(S): 8.6 TABLET ORAL at 11:26

## 2021-01-01 RX ADMIN — SODIUM CHLORIDE 1000 MILLILITER(S): 9 INJECTION, SOLUTION INTRAVENOUS at 00:15

## 2021-01-01 RX ADMIN — VASOPRESSIN 2.4 UNIT(S)/MIN: 20 INJECTION INTRAVENOUS at 04:41

## 2021-01-01 RX ADMIN — Medication 1 APPLICATION(S): at 06:35

## 2021-01-01 RX ADMIN — SODIUM CHLORIDE 100 MILLILITER(S): 9 INJECTION, SOLUTION INTRAVENOUS at 09:05

## 2021-01-01 RX ADMIN — MIDODRINE HYDROCHLORIDE 5 MILLIGRAM(S): 2.5 TABLET ORAL at 21:03

## 2021-01-01 RX ADMIN — Medication 2: at 11:26

## 2021-01-01 RX ADMIN — CHLORHEXIDINE GLUCONATE 15 MILLILITER(S): 213 SOLUTION TOPICAL at 05:06

## 2021-01-01 RX ADMIN — MORPHINE SULFATE 4 MILLIGRAM(S): 50 CAPSULE, EXTENDED RELEASE ORAL at 15:33

## 2021-01-01 RX ADMIN — MIDODRINE HYDROCHLORIDE 5 MILLIGRAM(S): 2.5 TABLET ORAL at 13:17

## 2021-01-01 RX ADMIN — SODIUM CHLORIDE 4 MILLILITER(S): 9 INJECTION INTRAMUSCULAR; INTRAVENOUS; SUBCUTANEOUS at 17:16

## 2021-01-01 RX ADMIN — MIDAZOLAM HYDROCHLORIDE 4 MILLIGRAM(S): 1 INJECTION, SOLUTION INTRAMUSCULAR; INTRAVENOUS at 10:14

## 2021-01-01 RX ADMIN — MIDODRINE HYDROCHLORIDE 10 MILLIGRAM(S): 2.5 TABLET ORAL at 14:46

## 2021-01-01 RX ADMIN — POLYETHYLENE GLYCOL 3350 17 GRAM(S): 17 POWDER, FOR SOLUTION ORAL at 12:18

## 2021-01-01 RX ADMIN — FENTANYL CITRATE 50 MICROGRAM(S): 50 INJECTION INTRAVENOUS at 11:05

## 2021-01-01 RX ADMIN — FENTANYL CITRATE 50 MICROGRAM(S): 50 INJECTION INTRAVENOUS at 01:22

## 2021-01-01 RX ADMIN — Medication 10 MILLIGRAM(S): at 12:19

## 2021-01-01 RX ADMIN — SENNA PLUS 5 MILLILITER(S): 8.6 TABLET ORAL at 13:10

## 2021-01-01 RX ADMIN — Medication 4: at 07:32

## 2021-01-01 RX ADMIN — Medication 100 MILLIGRAM(S): at 14:44

## 2021-01-01 RX ADMIN — BUDESONIDE AND FORMOTEROL FUMARATE DIHYDRATE 2 PUFF(S): 160; 4.5 AEROSOL RESPIRATORY (INHALATION) at 17:09

## 2021-01-01 RX ADMIN — MIDODRINE HYDROCHLORIDE 10 MILLIGRAM(S): 2.5 TABLET ORAL at 05:57

## 2021-01-01 RX ADMIN — POLYETHYLENE GLYCOL 3350 17 GRAM(S): 17 POWDER, FOR SOLUTION ORAL at 14:11

## 2021-01-01 RX ADMIN — Medication 100 MILLIEQUIVALENT(S): at 03:42

## 2021-01-01 RX ADMIN — SODIUM CHLORIDE 4 MILLILITER(S): 9 INJECTION INTRAMUSCULAR; INTRAVENOUS; SUBCUTANEOUS at 00:47

## 2021-01-01 RX ADMIN — ALBUTEROL 2.5 MILLIGRAM(S): 90 AEROSOL, METERED ORAL at 23:19

## 2021-01-01 RX ADMIN — ALBUTEROL 2.5 MILLIGRAM(S): 90 AEROSOL, METERED ORAL at 00:45

## 2021-01-01 RX ADMIN — SODIUM CHLORIDE 250 MILLILITER(S): 9 INJECTION INTRAMUSCULAR; INTRAVENOUS; SUBCUTANEOUS at 15:27

## 2021-01-01 RX ADMIN — Medication 4: at 13:27

## 2021-01-01 RX ADMIN — MIDODRINE HYDROCHLORIDE 10 MILLIGRAM(S): 2.5 TABLET ORAL at 13:33

## 2021-01-01 RX ADMIN — PHENYLEPHRINE HYDROCHLORIDE 12.1 MICROGRAM(S)/KG/MIN: 10 INJECTION INTRAVENOUS at 00:16

## 2021-01-01 RX ADMIN — CHLORHEXIDINE GLUCONATE 15 MILLILITER(S): 213 SOLUTION TOPICAL at 05:24

## 2021-01-01 RX ADMIN — MIDODRINE HYDROCHLORIDE 10 MILLIGRAM(S): 2.5 TABLET ORAL at 11:36

## 2021-01-01 RX ADMIN — MIDODRINE HYDROCHLORIDE 10 MILLIGRAM(S): 2.5 TABLET ORAL at 00:34

## 2021-01-01 RX ADMIN — ALBUTEROL 2.5 MILLIGRAM(S): 90 AEROSOL, METERED ORAL at 17:08

## 2021-01-01 RX ADMIN — Medication 50 MILLIGRAM(S): at 22:54

## 2021-01-01 RX ADMIN — PHENYLEPHRINE HYDROCHLORIDE 12.1 MICROGRAM(S)/KG/MIN: 10 INJECTION INTRAVENOUS at 09:15

## 2021-01-01 RX ADMIN — Medication 25 GRAM(S): at 15:07

## 2021-01-01 RX ADMIN — Medication 50 MILLIGRAM(S): at 14:00

## 2021-01-01 RX ADMIN — CHLORHEXIDINE GLUCONATE 15 MILLILITER(S): 213 SOLUTION TOPICAL at 06:34

## 2021-01-01 RX ADMIN — FENTANYL CITRATE 3.24 MICROGRAM(S)/KG/HR: 50 INJECTION INTRAVENOUS at 10:12

## 2021-01-01 RX ADMIN — PHENYLEPHRINE HYDROCHLORIDE 12.1 MICROGRAM(S)/KG/MIN: 10 INJECTION INTRAVENOUS at 05:21

## 2021-01-01 RX ADMIN — Medication 2: at 05:33

## 2021-01-01 RX ADMIN — Medication 6.07 MICROGRAM(S)/KG/MIN: at 17:01

## 2021-01-01 RX ADMIN — CHLORHEXIDINE GLUCONATE 15 MILLILITER(S): 213 SOLUTION TOPICAL at 17:06

## 2021-01-01 RX ADMIN — Medication 6: at 05:15

## 2021-01-01 RX ADMIN — CHLORHEXIDINE GLUCONATE 1 APPLICATION(S): 213 SOLUTION TOPICAL at 05:58

## 2021-01-01 RX ADMIN — CHLORHEXIDINE GLUCONATE 15 MILLILITER(S): 213 SOLUTION TOPICAL at 17:24

## 2021-01-01 RX ADMIN — MIDODRINE HYDROCHLORIDE 2.5 MILLIGRAM(S): 2.5 TABLET ORAL at 05:47

## 2021-01-01 RX ADMIN — POLYETHYLENE GLYCOL 3350 17 GRAM(S): 17 POWDER, FOR SOLUTION ORAL at 11:26

## 2021-01-01 RX ADMIN — Medication 50 MILLIGRAM(S): at 10:50

## 2021-01-01 RX ADMIN — SODIUM CHLORIDE 4 MILLILITER(S): 9 INJECTION INTRAMUSCULAR; INTRAVENOUS; SUBCUTANEOUS at 11:50

## 2021-01-01 RX ADMIN — Medication 50 MILLIGRAM(S): at 22:04

## 2021-01-01 RX ADMIN — SODIUM CHLORIDE 1000 MILLILITER(S): 9 INJECTION INTRAMUSCULAR; INTRAVENOUS; SUBCUTANEOUS at 18:39

## 2021-01-01 RX ADMIN — FENTANYL CITRATE 25 MICROGRAM(S): 50 INJECTION INTRAVENOUS at 05:19

## 2021-01-01 RX ADMIN — PANTOPRAZOLE SODIUM 40 MILLIGRAM(S): 20 TABLET, DELAYED RELEASE ORAL at 17:17

## 2021-01-01 RX ADMIN — PHENYLEPHRINE HYDROCHLORIDE 12.1 MICROGRAM(S)/KG/MIN: 10 INJECTION INTRAVENOUS at 08:46

## 2021-01-01 RX ADMIN — ALBUTEROL 2.5 MILLIGRAM(S): 90 AEROSOL, METERED ORAL at 00:47

## 2021-01-01 RX ADMIN — CHLORHEXIDINE GLUCONATE 1 APPLICATION(S): 213 SOLUTION TOPICAL at 05:04

## 2021-01-01 RX ADMIN — VASOPRESSIN 1.2 UNIT(S)/MIN: 20 INJECTION INTRAVENOUS at 02:53

## 2021-01-01 RX ADMIN — MIDODRINE HYDROCHLORIDE 5 MILLIGRAM(S): 2.5 TABLET ORAL at 21:22

## 2021-01-01 RX ADMIN — MORPHINE SULFATE 4 MILLIGRAM(S): 50 CAPSULE, EXTENDED RELEASE ORAL at 17:28

## 2021-01-01 RX ADMIN — Medication 500 MILLIGRAM(S): at 18:39

## 2021-01-01 RX ADMIN — CHLORHEXIDINE GLUCONATE 1 APPLICATION(S): 213 SOLUTION TOPICAL at 05:02

## 2021-01-01 RX ADMIN — PHENYLEPHRINE HYDROCHLORIDE 12.1 MICROGRAM(S)/KG/MIN: 10 INJECTION INTRAVENOUS at 04:09

## 2021-01-01 RX ADMIN — SENNA PLUS 5 MILLILITER(S): 8.6 TABLET ORAL at 11:33

## 2021-01-01 RX ADMIN — PHENYLEPHRINE HYDROCHLORIDE 12.1 MICROGRAM(S)/KG/MIN: 10 INJECTION INTRAVENOUS at 05:43

## 2021-01-01 RX ADMIN — Medication 2: at 17:12

## 2021-01-01 RX ADMIN — DEXMEDETOMIDINE HYDROCHLORIDE IN 0.9% SODIUM CHLORIDE 6.47 MICROGRAM(S)/KG/HR: 4 INJECTION INTRAVENOUS at 00:28

## 2021-01-01 RX ADMIN — Medication 100 MILLIEQUIVALENT(S): at 01:08

## 2021-01-01 RX ADMIN — Medication 100 MILLIEQUIVALENT(S): at 03:31

## 2021-01-01 RX ADMIN — PHENYLEPHRINE HYDROCHLORIDE 12.1 MICROGRAM(S)/KG/MIN: 10 INJECTION INTRAVENOUS at 06:36

## 2021-01-01 RX ADMIN — CHLORHEXIDINE GLUCONATE 15 MILLILITER(S): 213 SOLUTION TOPICAL at 17:20

## 2021-01-01 RX ADMIN — CHLORHEXIDINE GLUCONATE 1 APPLICATION(S): 213 SOLUTION TOPICAL at 05:48

## 2021-01-01 RX ADMIN — CHLORHEXIDINE GLUCONATE 15 MILLILITER(S): 213 SOLUTION TOPICAL at 05:58

## 2021-01-01 RX ADMIN — CHLORHEXIDINE GLUCONATE 15 MILLILITER(S): 213 SOLUTION TOPICAL at 05:47

## 2021-01-01 RX ADMIN — PHENYLEPHRINE HYDROCHLORIDE 48.5 MICROGRAM(S)/KG/MIN: 10 INJECTION INTRAVENOUS at 10:58

## 2021-01-01 RX ADMIN — ALBUTEROL 2.5 MILLIGRAM(S): 90 AEROSOL, METERED ORAL at 11:36

## 2021-01-01 RX ADMIN — Medication 1 APPLICATION(S): at 06:04

## 2021-01-01 RX ADMIN — PIPERACILLIN AND TAZOBACTAM 200 GRAM(S): 4; .5 INJECTION, POWDER, LYOPHILIZED, FOR SOLUTION INTRAVENOUS at 13:14

## 2021-01-01 RX ADMIN — MIDAZOLAM HYDROCHLORIDE 1.29 MG/KG/HR: 1 INJECTION, SOLUTION INTRAMUSCULAR; INTRAVENOUS at 03:37

## 2021-01-01 RX ADMIN — PANTOPRAZOLE SODIUM 40 MILLIGRAM(S): 20 TABLET, DELAYED RELEASE ORAL at 05:06

## 2021-01-01 RX ADMIN — Medication 10 MILLIGRAM(S): at 00:38

## 2021-01-01 RX ADMIN — Medication 2: at 06:30

## 2021-01-01 RX ADMIN — MEROPENEM 100 MILLIGRAM(S): 1 INJECTION INTRAVENOUS at 15:53

## 2021-01-01 RX ADMIN — CHLORHEXIDINE GLUCONATE 1 APPLICATION(S): 213 SOLUTION TOPICAL at 05:21

## 2021-01-01 RX ADMIN — MIDODRINE HYDROCHLORIDE 5 MILLIGRAM(S): 2.5 TABLET ORAL at 13:06

## 2021-01-01 RX ADMIN — Medication 100 MILLIEQUIVALENT(S): at 06:23

## 2021-01-01 RX ADMIN — SODIUM CHLORIDE 4 MILLILITER(S): 9 INJECTION INTRAMUSCULAR; INTRAVENOUS; SUBCUTANEOUS at 17:25

## 2021-01-01 RX ADMIN — PHENYLEPHRINE HYDROCHLORIDE 12.1 MICROGRAM(S)/KG/MIN: 10 INJECTION INTRAVENOUS at 15:33

## 2021-01-01 RX ADMIN — ALBUTEROL 2.5 MILLIGRAM(S): 90 AEROSOL, METERED ORAL at 00:23

## 2021-01-01 RX ADMIN — MIDODRINE HYDROCHLORIDE 10 MILLIGRAM(S): 2.5 TABLET ORAL at 21:49

## 2021-01-01 RX ADMIN — SENNA PLUS 5 MILLILITER(S): 8.6 TABLET ORAL at 12:18

## 2021-01-01 RX ADMIN — FENTANYL CITRATE 100 MICROGRAM(S): 50 INJECTION INTRAVENOUS at 00:33

## 2021-01-01 RX ADMIN — MIDODRINE HYDROCHLORIDE 10 MILLIGRAM(S): 2.5 TABLET ORAL at 06:35

## 2021-01-01 RX ADMIN — PHENYLEPHRINE HYDROCHLORIDE 12.1 MICROGRAM(S)/KG/MIN: 10 INJECTION INTRAVENOUS at 22:46

## 2021-01-01 RX ADMIN — Medication 1000 MILLIGRAM(S): at 00:33

## 2021-01-01 RX ADMIN — OXYCODONE HYDROCHLORIDE 5 MILLIGRAM(S): 5 TABLET ORAL at 18:00

## 2021-01-01 RX ADMIN — PANTOPRAZOLE SODIUM 40 MILLIGRAM(S): 20 TABLET, DELAYED RELEASE ORAL at 11:30

## 2021-01-01 RX ADMIN — PIPERACILLIN AND TAZOBACTAM 25 GRAM(S): 4; .5 INJECTION, POWDER, LYOPHILIZED, FOR SOLUTION INTRAVENOUS at 05:04

## 2021-01-01 RX ADMIN — MORPHINE SULFATE 4 MILLIGRAM(S): 50 CAPSULE, EXTENDED RELEASE ORAL at 18:07

## 2021-01-01 RX ADMIN — PROPOFOL 3.88 MICROGRAM(S)/KG/MIN: 10 INJECTION, EMULSION INTRAVENOUS at 15:22

## 2021-01-01 RX ADMIN — CHLORHEXIDINE GLUCONATE 15 MILLILITER(S): 213 SOLUTION TOPICAL at 17:26

## 2021-01-01 RX ADMIN — OXYCODONE HYDROCHLORIDE 5 MILLIGRAM(S): 5 TABLET ORAL at 09:35

## 2021-01-01 RX ADMIN — Medication 1 APPLICATION(S): at 17:20

## 2021-01-01 RX ADMIN — SODIUM CHLORIDE 4 MILLILITER(S): 9 INJECTION INTRAMUSCULAR; INTRAVENOUS; SUBCUTANEOUS at 00:24

## 2021-01-01 RX ADMIN — PANTOPRAZOLE SODIUM 40 MILLIGRAM(S): 20 TABLET, DELAYED RELEASE ORAL at 05:47

## 2021-01-01 RX ADMIN — Medication 100 MILLIGRAM(S): at 21:49

## 2021-01-01 RX ADMIN — ALBUTEROL 2.5 MILLIGRAM(S): 90 AEROSOL, METERED ORAL at 12:45

## 2021-01-01 RX ADMIN — MIDODRINE HYDROCHLORIDE 2.5 MILLIGRAM(S): 2.5 TABLET ORAL at 18:38

## 2021-01-01 RX ADMIN — DEXMEDETOMIDINE HYDROCHLORIDE IN 0.9% SODIUM CHLORIDE 6.47 MICROGRAM(S)/KG/HR: 4 INJECTION INTRAVENOUS at 14:11

## 2021-01-01 RX ADMIN — Medication 6.07 MICROGRAM(S)/KG/MIN: at 11:51

## 2021-01-01 RX ADMIN — CHLORHEXIDINE GLUCONATE 15 MILLILITER(S): 213 SOLUTION TOPICAL at 05:36

## 2021-01-01 RX ADMIN — CHLORHEXIDINE GLUCONATE 15 MILLILITER(S): 213 SOLUTION TOPICAL at 06:03

## 2021-01-01 RX ADMIN — ALBUTEROL 2.5 MILLIGRAM(S): 90 AEROSOL, METERED ORAL at 05:17

## 2021-01-01 RX ADMIN — Medication 100 MILLIEQUIVALENT(S): at 02:49

## 2021-01-01 RX ADMIN — MIDAZOLAM HYDROCHLORIDE 4 MILLIGRAM(S): 1 INJECTION, SOLUTION INTRAMUSCULAR; INTRAVENOUS at 02:41

## 2021-01-01 RX ADMIN — PIPERACILLIN AND TAZOBACTAM 25 GRAM(S): 4; .5 INJECTION, POWDER, LYOPHILIZED, FOR SOLUTION INTRAVENOUS at 17:34

## 2021-01-01 RX ADMIN — Medication 3.03 MICROGRAM(S)/KG/MIN: at 04:41

## 2021-01-01 RX ADMIN — Medication 100 MILLIGRAM(S): at 14:31

## 2021-01-01 RX ADMIN — AMIODARONE HYDROCHLORIDE 200 MILLIGRAM(S): 400 TABLET ORAL at 05:16

## 2021-01-01 RX ADMIN — PIPERACILLIN AND TAZOBACTAM 25 GRAM(S): 4; .5 INJECTION, POWDER, LYOPHILIZED, FOR SOLUTION INTRAVENOUS at 05:03

## 2021-01-01 RX ADMIN — PIPERACILLIN AND TAZOBACTAM 25 GRAM(S): 4; .5 INJECTION, POWDER, LYOPHILIZED, FOR SOLUTION INTRAVENOUS at 00:53

## 2021-01-01 RX ADMIN — PIPERACILLIN AND TAZOBACTAM 25 GRAM(S): 4; .5 INJECTION, POWDER, LYOPHILIZED, FOR SOLUTION INTRAVENOUS at 17:20

## 2021-01-01 RX ADMIN — Medication 50 MILLIGRAM(S): at 05:24

## 2021-01-01 RX ADMIN — Medication 650 MILLIGRAM(S): at 17:46

## 2021-01-01 RX ADMIN — OXYCODONE HYDROCHLORIDE 5 MILLIGRAM(S): 5 TABLET ORAL at 17:08

## 2021-01-01 RX ADMIN — Medication 650 MILLIGRAM(S): at 09:31

## 2021-01-01 RX ADMIN — CHLORHEXIDINE GLUCONATE 15 MILLILITER(S): 213 SOLUTION TOPICAL at 17:37

## 2021-01-01 RX ADMIN — FENTANYL CITRATE 25 MICROGRAM(S): 50 INJECTION INTRAVENOUS at 16:03

## 2021-01-01 RX ADMIN — OXYCODONE HYDROCHLORIDE 5 MILLIGRAM(S): 5 TABLET ORAL at 08:59

## 2021-01-01 RX ADMIN — Medication 100 MILLIGRAM(S): at 05:47

## 2021-01-01 RX ADMIN — SODIUM CHLORIDE 4 MILLILITER(S): 9 INJECTION INTRAMUSCULAR; INTRAVENOUS; SUBCUTANEOUS at 05:55

## 2021-01-01 RX ADMIN — ALBUTEROL 2.5 MILLIGRAM(S): 90 AEROSOL, METERED ORAL at 17:29

## 2021-01-01 RX ADMIN — PANTOPRAZOLE SODIUM 40 MILLIGRAM(S): 20 TABLET, DELAYED RELEASE ORAL at 12:19

## 2021-01-01 RX ADMIN — MIDODRINE HYDROCHLORIDE 10 MILLIGRAM(S): 2.5 TABLET ORAL at 13:13

## 2021-01-01 RX ADMIN — MIDODRINE HYDROCHLORIDE 5 MILLIGRAM(S): 2.5 TABLET ORAL at 21:52

## 2021-01-01 RX ADMIN — FENTANYL CITRATE 3.24 MICROGRAM(S)/KG/HR: 50 INJECTION INTRAVENOUS at 13:52

## 2021-01-01 RX ADMIN — CHLORHEXIDINE GLUCONATE 15 MILLILITER(S): 213 SOLUTION TOPICAL at 05:28

## 2021-01-01 RX ADMIN — Medication 2: at 06:19

## 2021-01-01 RX ADMIN — Medication 25 MILLIGRAM(S): at 05:47

## 2021-01-01 RX ADMIN — FENTANYL CITRATE 3.24 MICROGRAM(S)/KG/HR: 50 INJECTION INTRAVENOUS at 01:14

## 2021-01-01 RX ADMIN — Medication 500 MILLIGRAM(S): at 17:06

## 2021-01-01 RX ADMIN — Medication 2: at 13:03

## 2021-01-01 RX ADMIN — KETAMINE HYDROCHLORIDE 1.62 MG/KG/HR: 100 INJECTION INTRAMUSCULAR; INTRAVENOUS at 14:01

## 2021-01-01 RX ADMIN — PANTOPRAZOLE SODIUM 40 MILLIGRAM(S): 20 TABLET, DELAYED RELEASE ORAL at 12:02

## 2021-01-01 RX ADMIN — Medication 2: at 11:34

## 2021-01-01 RX ADMIN — FENTANYL CITRATE 50 MICROGRAM(S): 50 INJECTION INTRAVENOUS at 11:28

## 2021-01-01 RX ADMIN — SODIUM CHLORIDE 4 MILLILITER(S): 9 INJECTION INTRAMUSCULAR; INTRAVENOUS; SUBCUTANEOUS at 11:41

## 2021-01-01 RX ADMIN — ALBUTEROL 2.5 MILLIGRAM(S): 90 AEROSOL, METERED ORAL at 05:35

## 2021-01-01 RX ADMIN — SODIUM CHLORIDE 75 MILLILITER(S): 9 INJECTION INTRAMUSCULAR; INTRAVENOUS; SUBCUTANEOUS at 17:06

## 2021-01-01 RX ADMIN — BUDESONIDE AND FORMOTEROL FUMARATE DIHYDRATE 2 PUFF(S): 160; 4.5 AEROSOL RESPIRATORY (INHALATION) at 05:47

## 2021-01-01 RX ADMIN — Medication 1 APPLICATION(S): at 05:29

## 2021-01-01 RX ADMIN — Medication 40 MILLIGRAM(S): at 07:59

## 2021-01-01 RX ADMIN — Medication 650 MILLIGRAM(S): at 21:18

## 2021-01-01 RX ADMIN — Medication 1 APPLICATION(S): at 17:44

## 2021-01-01 RX ADMIN — Medication 50 MILLIGRAM(S): at 21:09

## 2021-01-01 RX ADMIN — MIDODRINE HYDROCHLORIDE 5 MILLIGRAM(S): 2.5 TABLET ORAL at 22:48

## 2021-01-01 RX ADMIN — MIDODRINE HYDROCHLORIDE 15 MILLIGRAM(S): 2.5 TABLET ORAL at 17:05

## 2021-01-01 RX ADMIN — PANTOPRAZOLE SODIUM 40 MILLIGRAM(S): 20 TABLET, DELAYED RELEASE ORAL at 17:24

## 2021-01-01 RX ADMIN — PANTOPRAZOLE SODIUM 40 MILLIGRAM(S): 20 TABLET, DELAYED RELEASE ORAL at 05:21

## 2021-01-01 RX ADMIN — PANTOPRAZOLE SODIUM 40 MILLIGRAM(S): 20 TABLET, DELAYED RELEASE ORAL at 12:17

## 2021-01-01 RX ADMIN — SODIUM CHLORIDE 4 MILLILITER(S): 9 INJECTION INTRAMUSCULAR; INTRAVENOUS; SUBCUTANEOUS at 05:35

## 2021-01-01 RX ADMIN — MIDAZOLAM HYDROCHLORIDE 2 MILLIGRAM(S): 1 INJECTION, SOLUTION INTRAMUSCULAR; INTRAVENOUS at 20:58

## 2021-01-01 RX ADMIN — CHLORHEXIDINE GLUCONATE 15 MILLILITER(S): 213 SOLUTION TOPICAL at 17:17

## 2021-01-01 RX ADMIN — AMIODARONE HYDROCHLORIDE 200 MILLIGRAM(S): 400 TABLET ORAL at 05:11

## 2021-01-01 RX ADMIN — MIDODRINE HYDROCHLORIDE 5 MILLIGRAM(S): 2.5 TABLET ORAL at 06:03

## 2021-01-01 RX ADMIN — CHLORHEXIDINE GLUCONATE 15 MILLILITER(S): 213 SOLUTION TOPICAL at 17:59

## 2021-01-01 RX ADMIN — SODIUM CHLORIDE 500 MILLILITER(S): 9 INJECTION INTRAMUSCULAR; INTRAVENOUS; SUBCUTANEOUS at 17:09

## 2021-01-01 RX ADMIN — MIDAZOLAM HYDROCHLORIDE 2 MILLIGRAM(S): 1 INJECTION, SOLUTION INTRAMUSCULAR; INTRAVENOUS at 02:00

## 2021-01-01 RX ADMIN — MIDODRINE HYDROCHLORIDE 5 MILLIGRAM(S): 2.5 TABLET ORAL at 13:30

## 2021-01-01 RX ADMIN — CHLORHEXIDINE GLUCONATE 1 APPLICATION(S): 213 SOLUTION TOPICAL at 06:35

## 2021-01-01 RX ADMIN — FENTANYL CITRATE 3.24 MICROGRAM(S)/KG/HR: 50 INJECTION INTRAVENOUS at 11:35

## 2021-01-01 RX ADMIN — VASOPRESSIN 2.4 UNIT(S)/MIN: 20 INJECTION INTRAVENOUS at 18:34

## 2021-01-01 RX ADMIN — SODIUM CHLORIDE 4 MILLILITER(S): 9 INJECTION INTRAMUSCULAR; INTRAVENOUS; SUBCUTANEOUS at 12:46

## 2021-01-01 RX ADMIN — Medication 10 MILLIGRAM(S): at 05:56

## 2021-01-01 RX ADMIN — Medication 6.07 MICROGRAM(S)/KG/MIN: at 02:22

## 2021-01-01 RX ADMIN — POLYETHYLENE GLYCOL 3350 17 GRAM(S): 17 POWDER, FOR SOLUTION ORAL at 12:49

## 2021-01-01 RX ADMIN — ALBUTEROL 2.5 MILLIGRAM(S): 90 AEROSOL, METERED ORAL at 17:30

## 2021-01-01 RX ADMIN — Medication 100 MILLIGRAM(S): at 05:35

## 2021-01-01 RX ADMIN — Medication 2: at 00:53

## 2021-01-01 RX ADMIN — FENTANYL CITRATE 3.24 MICROGRAM(S)/KG/HR: 50 INJECTION INTRAVENOUS at 00:19

## 2021-01-01 RX ADMIN — DEXMEDETOMIDINE HYDROCHLORIDE IN 0.9% SODIUM CHLORIDE 6.47 MICROGRAM(S)/KG/HR: 4 INJECTION INTRAVENOUS at 22:57

## 2021-01-01 RX ADMIN — ALBUTEROL 2.5 MILLIGRAM(S): 90 AEROSOL, METERED ORAL at 11:41

## 2021-01-01 RX ADMIN — SODIUM CHLORIDE 4 MILLILITER(S): 9 INJECTION INTRAMUSCULAR; INTRAVENOUS; SUBCUTANEOUS at 17:30

## 2021-01-01 RX ADMIN — Medication 6.07 MICROGRAM(S)/KG/MIN: at 09:35

## 2021-01-01 RX ADMIN — Medication 6: at 11:33

## 2021-01-01 RX ADMIN — CHLORHEXIDINE GLUCONATE 1 APPLICATION(S): 213 SOLUTION TOPICAL at 05:11

## 2021-01-01 RX ADMIN — CHLORHEXIDINE GLUCONATE 15 MILLILITER(S): 213 SOLUTION TOPICAL at 17:13

## 2021-01-01 RX ADMIN — PROPOFOL 3.88 MICROGRAM(S)/KG/MIN: 10 INJECTION, EMULSION INTRAVENOUS at 06:26

## 2021-01-01 RX ADMIN — PANTOPRAZOLE SODIUM 40 MILLIGRAM(S): 20 TABLET, DELAYED RELEASE ORAL at 06:03

## 2021-01-01 RX ADMIN — MIDODRINE HYDROCHLORIDE 15 MILLIGRAM(S): 2.5 TABLET ORAL at 12:17

## 2021-01-01 RX ADMIN — AMIODARONE HYDROCHLORIDE 33.3 MG/MIN: 400 TABLET ORAL at 01:49

## 2021-01-01 RX ADMIN — Medication 2: at 08:57

## 2021-01-01 RX ADMIN — Medication 10 MILLIGRAM(S): at 23:03

## 2021-01-01 RX ADMIN — Medication 50 MILLIGRAM(S): at 05:04

## 2021-01-01 RX ADMIN — SODIUM CHLORIDE 4 MILLILITER(S): 9 INJECTION INTRAMUSCULAR; INTRAVENOUS; SUBCUTANEOUS at 17:29

## 2021-01-01 RX ADMIN — FENTANYL CITRATE 25 MICROGRAM(S): 50 INJECTION INTRAVENOUS at 15:57

## 2021-06-17 PROBLEM — J44.9 CHRONIC OBSTRUCTIVE PULMONARY DISEASE, UNSPECIFIED: Chronic | Status: ACTIVE | Noted: 2020-01-01

## 2021-06-17 PROBLEM — N18.9 CHRONIC KIDNEY DISEASE, UNSPECIFIED: Chronic | Status: ACTIVE | Noted: 2020-01-01

## 2021-06-17 PROBLEM — I25.10 ATHEROSCLEROTIC HEART DISEASE OF NATIVE CORONARY ARTERY WITHOUT ANGINA PECTORIS: Chronic | Status: ACTIVE | Noted: 2020-01-01

## 2021-06-17 PROBLEM — I10 ESSENTIAL (PRIMARY) HYPERTENSION: Chronic | Status: ACTIVE | Noted: 2020-01-01

## 2021-06-30 PROBLEM — R68.89 SUSPECTED LYME DISEASE: Status: ACTIVE | Noted: 2021-01-01

## 2021-06-30 PROBLEM — Z83.3 FAMILY HISTORY OF DIABETES MELLITUS: Status: ACTIVE | Noted: 2021-01-01

## 2021-06-30 PROBLEM — R76.8 POSITIVE LYME DISEASE SEROLOGY: Status: RESOLVED | Noted: 2021-01-01 | Resolved: 2021-01-01

## 2021-06-30 PROBLEM — Z87.891 FORMER SMOKER: Status: ACTIVE | Noted: 2021-01-01

## 2021-06-30 PROBLEM — Z82.3 FAMILY HISTORY OF CEREBROVASCULAR ACCIDENT (CVA): Status: ACTIVE | Noted: 2021-01-01

## 2021-06-30 PROBLEM — Z00.00 ENCOUNTER FOR PREVENTIVE HEALTH EXAMINATION: Status: ACTIVE | Noted: 2017-06-16

## 2021-06-30 NOTE — PHYSICAL EXAM
[General Appearance - Alert] : alert [General Appearance - In No Acute Distress] : in no acute distress [Sclera] : the sclera and conjunctiva were normal [PERRL With Normal Accommodation] : pupils were equal in size, round, reactive to light [Extraocular Movements] : extraocular movements were intact [Outer Ear] : the ears and nose were normal in appearance [Oropharynx] : the oropharynx was normal with no thrush [Neck Appearance] : the appearance of the neck was normal [Neck Cervical Mass (___cm)] : no neck mass was observed [Jugular Venous Distention Increased] : there was no jugular-venous distention [Thyroid Diffuse Enlargement] : the thyroid was not enlarged [Heart Rate And Rhythm] : heart rate was normal and rhythm regular [Auscultation Breath Sounds / Voice Sounds] : lungs were clear to auscultation bilaterally [Heart Sounds] : normal S1 and S2 [Heart Sounds Gallop] : no gallops [Murmurs] : no murmurs [Heart Sounds Pericardial Friction Rub] : no pericardial rub [Full Pulse] : the pedal pulses are present [Edema] : there was no peripheral edema [Abdomen Soft] : soft [Bowel Sounds] : normal bowel sounds [Abdomen Tenderness] : non-tender [Abdomen Mass (___ Cm)] : no abdominal mass palpated [Costovertebral Angle Tenderness] : no CVA tenderness [No Palpable Adenopathy] : no palpable adenopathy [Nail Clubbing] : no clubbing  or cyanosis of the fingernails [Musculoskeletal - Swelling] : no joint swelling [Motor Tone] : muscle strength and tone were normal [Skin Color & Pigmentation] : normal skin color and pigmentation [] : no rash [Deep Tendon Reflexes (DTR)] : deep tendon reflexes were 2+ and symmetric [Sensation] : the sensory exam was normal to light touch and pinprick [No Focal Deficits] : no focal deficits [Oriented To Time, Place, And Person] : oriented to person, place, and time [Affect] : the affect was normal

## 2021-07-02 NOTE — END OF VISIT
[] : Fellow [FreeTextEntry3] : Patient seen and examined with Dr Membreno\par \par I agree with his history as noted above. Patient with advanced renal disease pre- dialysis. c/o memory loss and on work up noted to have borderline Lyme ab positive. Reviewed labs and sxs it is highly unlikely that the Lyme ab is the cause of his memory loss. He needs CT of head to r/o SBD or CVA given history of gait instability. Will need referral to Neurology for further evaluaiton and MRI of brain\par Send TFTs, B12/folate, syphilis as part of memory work up\par RTC after seeing Neurology

## 2021-07-02 NOTE — ASSESSMENT
[FreeTextEntry1] : Assessment:\par The patient is an 81 year old male with past medical history of CAD s/p CABG, hypertension, HLD, CKD stage III, COPD, and glaucoma who presents here with initial evaluation. He was found to have positive Lyme IgM serology on 3/18 3/4 bands as per outpatient labs. As per daughter Clary, she reports history of memory problems and is unsure if he may have had a prior tick exposure. He completed about 3-4 days of doxycycline but stopped it due to vomiting. \par \par Plan:\par # Positive Lyme IgM serology\par - Patient completed 3-4 days of outpatient doxycycline - will restart doxycycline 100 mg BID x 10 days (based on kidney function), recommend taking with food \par - Send CBC and CMP today\par - Tick-borne panel\par - HIV viral load, HIV 1/2 Ab, TB quantiferon, syphilis profile\par - Labs scanned into patient's chart \par \par # Prior HBV exposure\par - Repeat HBV PCR, HBV sAb, HCV RNA \par \par # Health Maintenance\par - CT head without contrast \par - Lipid profile\par - Serum B12 level\par - Send COVID-19 Inderjit Ab and nucleocapsid Ab\par \par # Recurrent falls with memory problems \par - CT head without contrast ordered\par - Send urinalysis\par - will need outpatient neurology evaluation \par - RTC 1 month

## 2021-07-02 NOTE — HISTORY OF PRESENT ILLNESS
[Women] : with women [FreeTextEntry1] : The patient is an 81 year old male with past medical history of dementia, CAD s/p CABG, hypertension, HLD, CKD stage III, COPD, and glaucoma who presents here with initial evaluation. He presents with his daughter at bedside Clary at bedside. He was referred here by his PCP for evaluation of Lyme disease. His daughter reports memory issues for the last year since his wife passed away. She reports that he was just recently started on oral antibiotics that he was taking for three to four days but started to vomit so she withheld the antibiotics. She believes the medication to be doxycycline for 21 days twice a day. As per referral sheet, he was found to have positive reflex to Western Blot IgM Lyme disease titer 3/18/21. As per daughter, she denies any recent travel or hiking prior to the lockdown after his wife  last year. He was only going to his appointments. He was born in [facility] and came to the [place] in . He is unable to remember history of any childhood infections. He has been home and lives with daughter. She states he has a history of recurrent falls. She reports he complains of back pain and knee pain. She denies any fever, chills, dizziness, palpitations, abdominal pain, nausea, vomiting, or diarrhea. She reports he has constipation. She states that he completed his COVID-19 vaccination series with Moderna. Daughter reports that she had COVID-19 along with her  and children. \par \par As per outpatient records\par 3/18 + Lyme IgM reflex to Western Blot 3/4 bands positive, Lyme IgM negative, RPR and TPA negative, HAV IgM negative, UA negative, HCV Ab negative, HAV Ab +, HBV cAb +, HBV sAb +, HBV sAg negative, HCV Ab negative \par \par Allergies: NKDA\par Surgical history: CAD s/p CABG , prior eye surgery\par Family history: sister has diabetes, brother  of stroke\par Social history: former smoker 10 - 15 years ago, 10-20 cigs a day, no alcohol or drug use \par \par \par  [Sexually Active] : The patient is not sexually active [de-identified] : None [de-identified] : Unemployed [de-identified] :  [de-identified] : Daughter

## 2021-07-28 PROBLEM — Z86.79 HISTORY OF HYPERTENSION: Status: RESOLVED | Noted: 2021-01-01 | Resolved: 2021-01-01

## 2021-07-28 PROBLEM — Z87.09 HISTORY OF CHRONIC OBSTRUCTIVE LUNG DISEASE: Status: RESOLVED | Noted: 2021-01-01 | Resolved: 2021-01-01

## 2021-07-28 PROBLEM — I25.810 CORONARY ARTERY DISEASE INVOLVING CORONARY BYPASS GRAFT OF NATIVE HEART WITHOUT ANGINA PECTORIS: Status: RESOLVED | Noted: 2021-01-01 | Resolved: 2021-01-01

## 2021-07-28 PROBLEM — N18.30 CKD (CHRONIC KIDNEY DISEASE), STAGE III: Status: RESOLVED | Noted: 2021-01-01 | Resolved: 2021-01-01

## 2021-07-28 PROBLEM — Z86.39 HISTORY OF HYPERLIPIDEMIA: Status: RESOLVED | Noted: 2021-01-01 | Resolved: 2021-01-01

## 2021-07-28 PROBLEM — R41.3 MEMORY LOSS: Status: ACTIVE | Noted: 2021-01-01

## 2021-07-28 PROBLEM — E87.1 HYPONATREMIA: Status: ACTIVE | Noted: 2021-01-01

## 2021-07-28 PROBLEM — Z86.69 HISTORY OF GLAUCOMA: Status: RESOLVED | Noted: 2021-01-01 | Resolved: 2021-01-01

## 2021-07-28 NOTE — END OF VISIT
[] : Fellow [FreeTextEntry3] : Patient seen and examined with Dr Membreno\par \par I agree with his history as noted above. Patient with advanced renal disease pre- dialysis. c/o memory loss and on work up noted to have borderline Lyme ab positive. Reviewed labs and sxs it is highly unlikely that the Lyme ab is the cause of his memory loss. He needs CT of head to r/o SBD or CVA given history of gait instability. Will need referral to Neurology for further evaluation and MRI of brain\par Send TFTs, B12/folate, syphilis as part of memory work up\par Testing here with negative tick ab panel- memory issues more likely from his renal disease no indication of active Lyme dz\par \par discussed with patient's daughter need for Neurology and Nephrology further evaluation and his CKD is approaching ESRD- \par \par follow up prn and after seeing Neurology\par RTC after seeing Neurology

## 2021-07-28 NOTE — ASSESSMENT
[FreeTextEntry1] : Assessment:\par The patient is an 81 year old male with past medical history of CAD s/p CABG, hypertension, HLD, CKD stage III, COPD, and glaucoma who presents here with initial evaluation. He was found to have positive Lyme IgM serology on 3/18 3/4 bands as per outpatient labs. As per daughter Clary, she reports history of memory problems and is unsure if he may have had a prior tick exposure. He completed about 3-4 days of doxycycline but stopped it due to vomiting. \par \par Plan:\par # Positive Lyme IgM serology\par - Patient completed doxycycline 100 mg BID x 11 days\par - Will send repeat CBC and CMP \par - Tick-borne panel negative \par - HIV viral load, HIV 1/2 Ab, TB quantiferon, syphilis profile negative\par \par # Recurrent falls with memory problems \par - CT head without contrast ordered, no history of trauma as per daughter\par - UA negative \par - pending outpatient neurology and neurology evaluation\par \par # Prior HBV exposure\par - Repeat HBV PCR, HBV sAb, HCV RNA, labs returned negative \par \par # Health Maintenance\par - Lipid profile normal\par - Serum B12 level normal\par - History of positive COVID-19 Inderjit Ab and nucleocapsid Ab\par \par RTC PRN\par \par \par Case seen and d/w Dr. Ana MD \par \par \par

## 2021-07-28 NOTE — HISTORY OF PRESENT ILLNESS
[Women] : with women [FreeTextEntry1] : The patient is an 81 year old male with past medical history of dementia, CAD s/p CABG, hypertension, HLD, CKD stage III, COPD, and glaucoma who presents here with follow-up of Lyme disease. He was started on oral doxycycline a month ago which he completed a 10 day course. He presents with his daughter at bedside Clary at bedside. He was referred here by his PCP for evaluation of Lyme disease. As per daughter, she denies any recent travel or hiking prior to the lockdown after his wife  last year. He was only going to his appointments. He was born in [facility] and came [place] the [place] in . . He has been home and lives with daughter. She states he has a history of recurrent falls. She reports he complains of back pain and knee pain. She denies any fever, chills, dizziness, palpitations, abdominal pain, nausea, vomiting, or diarrhea. She reports he has constipation. She states that he completed his COVID-19 vaccination series with Moderna. Referral for CT head w/o contrast is pending authorization. Daughter reports that she had COVID-19 along with her  and children. \par \par \par Last labs 21\par \par CBC: WWBC 11.91, H/H 10.0/32.9 \par CMP: Na 129, BUN 57, Cr 2.17\par + COVID-19 nAb and spike Ab\par HIV 1/2 negative, HIV viral load -, syphilis profile -, tick-borne panel -, HBV sAb +, HBV PCR -, HCV RNA -, TBQ -, \par \par \par As per outpatient records\par 3/18 + Lyme IgM reflex to Western Blot 3/4 bands positive, Lyme IgM negative, RPR and TPA negative, HAV IgM negative, UA negative, HCV Ab negative, HAV Ab +, HBV cAb +, HBV sAb +, HBV sAg negative, HCV Ab negative \par \par Allergies: NKDA\par Surgical history: CAD s/p CABG , prior eye surgery\par Family history: sister has diabetes, brother  of stroke\par Social history: former smoker 10 - 15 years ago, 10-20 cigs a day, no alcohol or drug use \par \par \par  [Sexually Active] : The patient is not sexually active [de-identified] : None [de-identified] : Unemployed [de-identified] :  [de-identified] : Daughter

## 2021-07-28 NOTE — PHYSICAL EXAM
[General Appearance - In No Acute Distress] : in no acute distress [Sclera] : the sclera and conjunctiva were normal [PERRL With Normal Accommodation] : pupils were equal in size, round, reactive to light [Extraocular Movements] : extraocular movements were intact [Outer Ear] : the ears and nose were normal in appearance [Oropharynx] : the oropharynx was normal with no thrush [Neck Appearance] : the appearance of the neck was normal [Jugular Venous Distention Increased] : there was no jugular-venous distention [Neck Cervical Mass (___cm)] : no neck mass was observed [Thyroid Diffuse Enlargement] : the thyroid was not enlarged [Auscultation Breath Sounds / Voice Sounds] : lungs were clear to auscultation bilaterally [Heart Rate And Rhythm] : heart rate was normal and rhythm regular [Heart Sounds] : normal S1 and S2 [Heart Sounds Gallop] : no gallops [Murmurs] : no murmurs [Heart Sounds Pericardial Friction Rub] : no pericardial rub [Full Pulse] : the pedal pulses are present [Edema] : there was no peripheral edema [Bowel Sounds] : normal bowel sounds [Abdomen Soft] : soft [Abdomen Tenderness] : non-tender [Abdomen Mass (___ Cm)] : no abdominal mass palpated [Costovertebral Angle Tenderness] : no CVA tenderness [No Palpable Adenopathy] : no palpable adenopathy [Musculoskeletal - Swelling] : no joint swelling [Nail Clubbing] : no clubbing  or cyanosis of the fingernails [Motor Tone] : muscle strength and tone were normal [Skin Color & Pigmentation] : normal skin color and pigmentation [] : no rash [Deep Tendon Reflexes (DTR)] : deep tendon reflexes were 2+ and symmetric [Sensation] : the sensory exam was normal to light touch and pinprick [No Focal Deficits] : no focal deficits [Oriented To Time, Place, And Person] : oriented to person, place, and time [Affect] : the affect was normal

## 2021-07-28 NOTE — REASON FOR VISIT
[Follow-Up: _____] : a [unfilled] follow-up visit [Initial Evaluation] : an initial evaluation [FreeTextEntry1] : Lyme disease follow-up

## 2021-08-31 NOTE — H&P ADULT - NSICDXPASTMEDICALHX_GEN_ALL_CORE_FT
PAST MEDICAL HISTORY:  Blindness of right eye     CAD (coronary artery disease)     Chronic kidney disease (CKD)     COPD without exacerbation     Glaucoma     HTN (hypertension)

## 2021-08-31 NOTE — ED PROVIDER NOTE - OBJECTIVE STATEMENT
80 yo M w/PMH of CKD, orthostatic hypotension, CABG more than 12 years ago without issue, HLD, COPD, occasional memory loss/confusion presents to the ED s/p fall. Per daughter pt at baseline, AAOx2 at baseline. Daughter is translating for pt and reports pt got his foot got stuck and fell. Witnessed by grandson. No head-strike or LOC. Pt on ASA 81mg. Denies AC use. Denies fever/chills, cough, CP, SOB, N/V, abdominal pain or headache. Pt unable to ambulate. Pt main complaint is right hip pain.    No fever/chills, No photophobia/eye pain/changes in vision, No ear pain/sore throat/dysphagia, No chest pain/palpitations, no SOB/cough/wheeze/stridor, No abdominal pain, No N/V/D, no dysuria/frequency/discharge, No neck/back pain, no rash, no changes in neurological status/function. +R hip pain 82 yo M w/PMH of CKD, orthostatic hypotension, CABG more than 12 years ago without issue, HLD, COPD, occasional memory loss/confusion presents to the ED s/p fall. Per daughter pt at baseline, AAOx2 at baseline. Daughter is translating for pt and reports pt got his foot got stuck and fell. Fall Witnessed by grandson. No head-strike or LOC. Pt on ASA 81mg. Denies AC use. Denies fever/chills, cough, CP, SOB, N/V, abdominal pain or headache. Pt unable to ambulate. Pt main complaint is right hip pain.    No fever/chills, No photophobia/eye pain/changes in vision, No ear pain/sore throat/dysphagia, No chest pain/palpitations, no SOB/cough/wheeze/stridor, No abdominal pain, No N/V/D, no dysuria/frequency/discharge, No neck/back pain, no rash, no changes in neurological status/function. +R hip pain

## 2021-08-31 NOTE — CONSULT NOTE ADULT - SUBJECTIVE AND OBJECTIVE BOX
HPI:  82 yo M community ambulator w/PMH of CKD, orthostatic hypotension, CABG more than 12 years ago without issue, HLD, COPD, occasional memory loss/confusion presents to the ED s/p fall. Per daughter pt at baseline, AAOx2 at baseline. Daughter is translating for pt and reports pt got his foot got stuck and fell. Fall Witnessed by grandson. No head-strike or LOC. Pt on ASA 81mg. Denies AC use. Denies fever/chills, cough, CP, SOB, N/V, abdominal pain or headache. Pt unable to ambulate. Pt main complaint is right hip pain     	No fever/chills, No photophobia/eye pain/changes in vision, No ear pain/sore throat/dysphagia, No chest pain/palpitations, no SOB/cough/wheeze/stridor, No abdominal pain, No N/V/D, no dysuria/frequency/discharge, No neck/back pain, no rash, no changes in neurological status/function.  (31 Aug 2021 17:35)      PAST MEDICAL & SURGICAL HISTORY:  Glaucoma    Blindness of right eye    CAD (coronary artery disease)    HTN (hypertension)    Chronic kidney disease (CKD)    COPD without exacerbation    H/O coronary artery bypass surgery        REVIEW OF SYSTEMS:    CONSTITUTIONAL: hip pain   EYES: No eye pain, visual disturbances, or discharge  ENMT:  No difficulty hearing, tinnitus, vertigo; No sinus or throat pain  NECK: No pain or stiffness  BREASTS: No pain, masses, or nipple discharge  RESPIRATORY: No cough, wheezing, chills or hemoptysis; No shortness of breath  CARDIOVASCULAR: No chest pain, palpitations, dizziness, or leg swelling  GASTROINTESTINAL: No abdominal or epigastric pain. No nausea, vomiting, or hematemesis; No diarrhea or constipation. No melena or hematochezia.  GENITOURINARY: No dysuria, frequency, hematuria, or incontinence  NEUROLOGICAL: No headaches, memory loss, loss of strength, numbness, or tremors  SKIN: No itching, burning, rashes, or lesions   LYMPH NODES: No enlarged glands  ENDOCRINE: No heat or cold intolerance; No hair loss  MUSCULOSKELETAL: No joint pain or swelling; No muscle, back, or extremity pain  PSYCHIATRIC: No depression, anxiety, mood swings, or difficulty sleeping  HEME/LYMPH: No easy bruising, or bleeding gums  ALLERGY AND IMMUNOLOGIC: No hives or eczema      MEDICATIONS  (STANDING):  atorvastatin Oral Tab/Cap - Peds 40 milliGRAM(s) Oral Once  budesonide 160 MICROgram(s)/formoterol 4.5 MICROgram(s) Inhaler 2 Puff(s) Inhalation two times a day    MEDICATIONS  (PRN):      Allergies    No Known Allergies    Intolerances        SOCIAL HISTORY:    FAMILY HISTORY:  No pertinent family history in first degree relatives        Vital Signs Last 24 Hrs  T(C): 36.7 (31 Aug 2021 15:31), Max: 36.7 (31 Aug 2021 13:48)  T(F): 98 (31 Aug 2021 15:31), Max: 98 (31 Aug 2021 13:48)  HR: 78 (31 Aug 2021 15:31) (64 - 78)  BP: 113/56 (31 Aug 2021 16:42) (92/59 - 120/93)  BP(mean): --  RR: 19 (31 Aug 2021 15:31) (17 - 19)  SpO2: 98% (31 Aug 2021 15:31) (97% - 98%)    PHYSICAL EXAM:    GENERAL: NAD, well-groomed, well-developed  HEAD:  Atraumatic, Normocephalic  EYES: EOMI, PERRLA, conjunctiva and sclera clear  ENMT: No tonsillar erythema, exudates, or enlargement; Moist mucous membranes, Good dentition, No lesions  NECK: Supple, No JVD, Normal thyroid  NERVOUS SYSTEM:  Alert & Oriented X2, Good concentration; Motor Strength 5/5 B/L upper and lower extremities; DTRs 2+ intact and symmetric  CHEST/LUNG: Clear to percussion bilaterally; No rales, rhonchi, wheezing, or rubs  HEART: Regular rate and rhythm; No murmurs, rubs, or gallops  ABDOMEN: Soft, Nontender, Nondistended; Bowel sounds present  EXTREMITIES:  rt hip int rotated, and tender   LYMPH: No lymphadenopathy noted  SKIN: No rashes or lesions      LABS:                        8.1    10.43 )-----------( 185      ( 31 Aug 2021 15:33 )             24.9     08-31    127<L>  |  98  |  50<H>  ----------------------------<  126<H>  4.2   |  23  |  2.78<H>    Ca    8.2<L>      31 Aug 2021 15:33    TPro  6.6  /  Alb  2.8<L>  /  TBili  0.3  /  DBili  x   /  AST  28  /  ALT  22  /  AlkPhos  84  08-31    PT/INR - ( 31 Aug 2021 15:33 )   PT: 11.8 sec;   INR: 1.02 ratio         PTT - ( 31 Aug 2021 15:33 )  PTT:33.2 sec      RADIOLOGY & ADDITIONAL STUDIES:

## 2021-08-31 NOTE — CONSULT NOTE ADULT - TIME BILLING
Lab test review, Radiology Review, Vitals review, Consultant review and discussion, Physical examination, IDR, Assessment and plan; Plan discussion with patient and family

## 2021-08-31 NOTE — H&P ADULT - ASSESSMENT
81y Male community ambulatory presents c/o R hip pain and inability to ambulate sp mechanical fall.

## 2021-08-31 NOTE — H&P ADULT - NSHPPHYSICALEXAM_GEN_ALL_CORE
General: Appears stated age, WD/WN  Neuro: A&Ox2  Head: NC/AT  EENT: PERRLA. EOMI. Conjunctiva and sclera clear. Pharynx clear.  Lungs: CTA B/l. Nonlabored Respirations  CV: +S1S2, RRR  Abdomen: Nondistended, +BS, Soft, Nontender, no guarding, no rebound  Extremities: Warm and well perfused. 2+ peripheral pulses b/l. Calf soft, nontender b/l. No pedal edema.  RLE: +TTP to right hip. Shortened and rotated. Sensation grossly intact. +ROM to ankle/toes. Limited SLR 2/2 to pain  RUE: Skin abrasion/+TTP to elbow

## 2021-08-31 NOTE — RAPID RESPONSE TEAM SUMMARY - NSSITUATIONBACKGROUNDRRT_GEN_ALL_CORE
Patient is a 80 y/o male with PMHx CKD, orthostatic hypotension, CABG more than 12 years ago without issue, HLD, COPD, occasional memory loss/confusion, admitted with Right hip fracture and hyponatremia. In ED, patient received morphine 4mg IVP within 2 hour span, RN noted BP to downtrend. Ortho PA made aware and gave IVF 250mL bolus, followed by 500mL bolus. RRT called for SBP 50.

## 2021-08-31 NOTE — ED PROVIDER NOTE - PHYSICAL EXAMINATION
Gen: Alert, Well appearing. NAD    Head: NC, AT, PERRL, normal lids/conjunctiva   ENT: patent oropharynx without erythema/exudate, uvula midline  Neck: supple, no tenderness/meningismus  Pulm: Bilateral clear BS, normal resp effort  CV: RRR, no M/R/G, +dist pulses   Abd: soft, NT/ND, +BS, no guarding/rebound tenderness  Mskel: + rt hip tenderness, shortened and internally rotated/ + DP/PT pulses. able to move toes and ankle. sensation intact.  Skin: no rash, no bruising  Neuro: AAOx2, no sensory/motor deficits

## 2021-08-31 NOTE — RAPID RESPONSE TEAM SUMMARY - NSOTHERINTERVENTIONSRRT_GEN_ALL_CORE
-EKG NSR, unchanged from previous EKG  -hgb noted to be 6.9, 2uprbc ordered  -blood consent obtained by ortho PA   -cbc post transfusion  -vitals q4h  -discussed with hospitalist Dr. Bailey, STAT CTAP, transfer to telemetry  -plan of RRT with Dr. Williamson

## 2021-08-31 NOTE — H&P ADULT - ATTENDING COMMENTS
Agree with above consultant's report  Dx: Right hip displaced and comminuted intertrochanteric fracture  Plan: For OR for IM nailing of this right hip fracture once medically cleared/optimized. Patient at increased risk due to medical comorbidities present at time of admission.

## 2021-08-31 NOTE — ED ADULT NURSE NOTE - OBJECTIVE STATEMENT
A7Ox4, ambulating. p/w right hip pain r/t fall today at home. pt daughter states "he was walking from pone room to the other room and his leg got caught"

## 2021-08-31 NOTE — RAPID RESPONSE TEAM SUMMARY - NSMEDICATIONSRRT_GEN_ALL_CORE
-1LNS bolus  -midodrine 2.5mg TID home dose restarted with stat dose now   -hold parameters for oxycodone pain control

## 2021-08-31 NOTE — ED ADULT NURSE NOTE - NSIMPLEMENTINTERV_GEN_ALL_ED
Addended by: SCAR ELLIS on: 7/19/2018 03:49 PM     Modules accepted: Orders    
Implemented All Universal Safety Interventions:  [place] to call system. Call bell, personal items and telephone within reach. Instruct patient to call for assistance. Room bathroom lighting operational. Non-slip footwear when patient is off stretcher. Physically safe environment: no spills, clutter or unnecessary equipment. Stretcher in lowest position, wheels locked, appropriate side rails in place.

## 2021-08-31 NOTE — PHARMACOTHERAPY INTERVENTION NOTE - COMMENTS
Spoke to PA and recommended heparin instead of lovenox due to Scr. PA wants one time order of Lovenox.

## 2021-08-31 NOTE — CONSULT NOTE ADULT - ASSESSMENT
82 yo M community ambulator w/PMH of CKD, orthostatic hypotension, CABG more than 12 years ago without issue, HLD, COPD, occasional memory loss/confusion presents to the ED s/p fall. Per daughter pt at baseline, AAOx2 at baseline. Daughter is translating for pt and reports pt got his foot got stuck and fell. Fall Witnessed by grandson. No head-strike or LOC. Pt on ASA 81mg. Denies AC use. Denies fever/chills, cough, CP, SOB, N/V, abdominal pain or headache. Pt unable to ambulate. Pt main complaint is right hip pain       R hip fracture  -management per ortho, hx of CAD w CABG will need cardio evaluation prior to surgery    CKD4  -cr stable, continue to monitor      COPD  -c/w inhaler    HLD  -on statin  82 yo M community ambulator w/PMH of CKD, orthostatic hypotension, CABG more than 12 years ago without issue, HLD, COPD, occasional memory loss/confusion presents to the ED s/p fall. Per daughter pt at baseline, AAOx2 at baseline. Daughter is translating for pt and reports pt got his foot got stuck and fell. Fall Witnessed by grandson. No head-strike or LOC. Pt on ASA 81mg. Denies AC use. Denies fever/chills, cough, CP, SOB, N/V, abdominal pain or headache. Pt unable to ambulate. Pt main complaint is right hip pain       R hip fracture  -management per ortho, hx of CAD w CABG will need cardio evaluation prior to surgery    CKD4  -cr stable, continue to monitor    Hyponatremia  -hydrate, repeat bmp in am     COPD  -c/w inhaler    HLD  -on statin

## 2021-08-31 NOTE — ED ADULT NURSE NOTE - CHIEF COMPLAINT QUOTE
pt a&o x3 pt biba for trip and fall at home with r leg deformity. NO head injury no lOC.  skin tear r elbow. R hip internal rotated and shortened. Pain to r hip, sensation intact. No blood thinner use. PMH CKD, HTN

## 2021-08-31 NOTE — H&P ADULT - HISTORY OF PRESENT ILLNESS
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 Per daughter pt at baseline, AAOx2 at baseline. Daughter is translating for pt and reports pt got his foot got stuck and fell. Fall Witnessed by grandson. No head-strike or LOC. Pt on ASA 81mg. Denies AC use. Denies fever/chills, cough, CP, SOB, N/V, abdominal pain or headache. Pt unable to ambulate. Pt main complaint is right hip pain     	No fever/chills, No photophobia/eye pain/changes in vision, No ear pain/sore throat/dysphagia, No chest pain/palpitations, no SOB/cough/wheeze/stridor, No abdominal pain, No N/V/D, no dysuria/frequency/discharge, No neck/back pain, no rash, no changes in neurological status/function.

## 2021-09-01 NOTE — PROGRESS NOTE ADULT - SUBJECTIVE AND OBJECTIVE BOX
ABDIRASHID QUIROGA  81y  Male    Patient is a 81y old  Male who presents with a chief complaint of Right hip fracture (01 Sep 2021 10:41)      INTERVAL HPI/OVERNIGHT EVENTS: Seen for follow up for medical management and clearance for right hip procedure with orthopedics    ROS:  No fever/chills   No photophobia/eye pain/changes in vision  No ear pain/sore throat/dysphagia  No chest pain/palpitations, no SOB/cough/wheeze/stridor  No abdominal pain  No N/V/D  no dysuria/frequency/discharge  No neck/back pain  no rash  no changes in neurological status/function      PAST MEDICAL & SURGICAL HISTORY:  Glaucoma    Blindness of right eye    CAD (coronary artery disease)    HTN (hypertension)    Chronic kidney disease (CKD)    COPD without exacerbation    H/O coronary artery bypass surgery        REVIEW OF SYSTEMS:    CONSTITUTIONAL: hip pain   EYES: No eye pain, visual disturbances, or discharge  ENMT:  No difficulty hearing, tinnitus, vertigo; No sinus or throat pain  NECK: No pain or stiffness  BREASTS: No pain, masses, or nipple discharge  RESPIRATORY: No cough, wheezing, chills or hemoptysis; No shortness of breath  CARDIOVASCULAR: No chest pain, palpitations, dizziness, or leg swelling  GASTROINTESTINAL: No abdominal or epigastric pain. No nausea, vomiting, or hematemesis; No diarrhea or constipation. No melena or hematochezia.  GENITOURINARY: No dysuria, frequency, hematuria, or incontinence  NEUROLOGICAL: No headaches, memory loss, loss of strength, numbness, or tremors  SKIN: No itching, burning, rashes, or lesions   LYMPH NODES: No enlarged glands  ENDOCRINE: No heat or cold intolerance; No hair loss  MUSCULOSKELETAL: No joint pain or swelling; No muscle, back, or extremity pain  PSYCHIATRIC: No depression, anxiety, mood swings, or difficulty sleeping  HEME/LYMPH: No easy bruising, or bleeding gums  ALLERGY AND IMMUNOLOGIC: No hives or eczema      MEDICATIONS  (STANDING):  atorvastatin Oral Tab/Cap - Peds 40 milliGRAM(s) Oral Once  budesonide 160 MICROgram(s)/formoterol 4.5 MICROgram(s) Inhaler 2 Puff(s) Inhalation two times a day    MEDICATIONS  (PRN):      Allergies    No Known Allergies    Intolerances        SOCIAL HISTORY:    FAMILY HISTORY:  No pertinent family history in first degree relatives        Vital Signs Last 24 Hrs  T(C): 36.7 (31 Aug 2021 15:31), Max: 36.7 (31 Aug 2021 13:48)  T(F): 98 (31 Aug 2021 15:31), Max: 98 (31 Aug 2021 13:48)  HR: 78 (31 Aug 2021 15:31) (64 - 78)  BP: 113/56 (31 Aug 2021 16:42) (92/59 - 120/93)  BP(mean): --  RR: 19 (31 Aug 2021 15:31) (17 - 19)  SpO2: 98% (31 Aug 2021 15:31) (97% - 98%)    PHYSICAL EXAM:    GENERAL: NAD, well-groomed, well-developed  HEAD:  Atraumatic, Normocephalic  EYES: EOMI, PERRLA, conjunctiva and sclera clear  ENMT: No tonsillar erythema, exudates, or enlargement; Moist mucous membranes, Good dentition, No lesions  NECK: Supple, No JVD, Normal thyroid  NERVOUS SYSTEM:  Alert & Oriented X2, Good concentration; Motor Strength 5/5 B/L upper and lower extremities; DTRs 2+ intact and symmetric  CHEST/LUNG: Clear to percussion bilaterally; No rales, rhonchi, wheezing, or rubs  HEART: Regular rate and rhythm; No murmurs, rubs, or gallops  ABDOMEN: Soft, Nontender, Nondistended; Bowel sounds present  EXTREMITIES:  rt hip int rotated, and tender   LYMPH: No lymphadenopathy noted  SKIN: No rashes or lesions      LABS:           Reviewed        RADIOLOGY & ADDITIONAL STUDIES:  Reviwed

## 2021-09-01 NOTE — CONSULT NOTE ADULT - ASSESSMENT
80 yo M community ambulator with a PMH of CAD s/p CABG more than 12 years ago without issue (normal recent echo and stress test as below), HTN, HLD, COPD, CKD, orthostatic hypotension, occasional memory loss/confusion; presented to the ED s/p mechanical fall (got his foot got stuck and fell... witnessed by grandson). No head-strike or LOC.   Pt on ASA 81mg; head CT neg.   Sustained R hip fx and awaiting OR tonight.  Of note, pt s/p rapid response yesterday afternoon/evening with blood pressure of 50s/20s; H&H of 6.9/21.  Given 2U of PRBC overnight, 1L NS bolus.   Stable this AM; denied any cardiac complaints including CP.  Does c/o some mild hip pain.  ECG: sinus 69bpm; LVH  Echo today normal  Stress test 11/2020 normal with no ischemia.  Patient is at intermediate cardiovascular risk for an intermediate risk procedure.   Would cont midodrine as BPs remain borderline; gentle IVFs with significant LAUREN.  Aim to keep Hg>9  Restart 81mg asa when ok post-op as per ortho; would add statin.  No bbs with hypotension.  Will sign off for now; please call back with any further questions,

## 2021-09-01 NOTE — PROGRESS NOTE ADULT - ASSESSMENT
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 Per daughter pt at baseline, AAOx2 at baseline. Daughter is translating for pt and reports pt got his foot got stuck and fell. Fall Witnessed by grandson. No head-strike or LOC. Pt on ASA 81mg. Denies AC use. Denies fever/chills, cough, CP, SOB, N/V, abdominal pain or headache. Pt unable to ambulate. Pt main complaint is right hip pain     Medically optimized for intermediate risk procedure, can proceed to surgery  Cardiology following  Echo today normal  Stress test 11/2020 normal with no ischemia.  Patient is at intermediate cardiovascular risk for an intermediate risk procedure.       R hip fracture  -Management per ortho    CKD4  -cr stable, continue to monitor    Hypotension  -c/w midodrine   -c/w IV hdyration    Hyponatremia  -hydrate    COPD  -c/w inhaler    HLD  -on statin  80 yo M community ambulator w/PMH of CKD, orthostatic hypotension, CABG more than 12 years ago without issue, HLD, COPD, occasional memory loss/confusion presents to the ED s/p fall. Per daughter pt at baseline, AAOx2 at baseline. Daughter is translating for pt and reports pt got his foot got stuck and fell. Fall Witnessed by grandson. No head-strike or LOC. Pt on ASA 81mg. Denies AC use. Denies fever/chills, cough, CP, SOB, N/V, abdominal pain or headache. Pt unable to ambulate. Pt main complaint is right hip pain     Medically optimized for intermediate risk procedure, can proceed to surgery  Cardiology following  Echo today normal  Stress test 11/2020 normal with no ischemia.  Patient is at intermediate cardiovascular risk for an intermediate risk procedure.     R hip fracture  Management per ortho    Acute blood loss anemia/  likely from bleeding from fracture, right thigh swelling  monitor cbc  transfuse to keep >7  send stool occult    Leukocytosis  likely reactive, afebrile and VSS  continue to monitor  no need for abx    CKD4  cr stable, continue to monitor    Hypotension  c/w midodrine   c/w IV hdyration    Hyponatremia  hydrate    COPD  c/w inhaler    HLD  on statin

## 2021-09-01 NOTE — PROGRESS NOTE ADULT - SUBJECTIVE AND OBJECTIVE BOX
Patient seen and examined at bedside this AM. Patient appears comfortable and more coherent this AM. Patient following commands and responding to questions. Patient had a Rapid response yesterday afternoon/evening after having a blood pressure of 50s/20s. Patient was found to have an H&H of 6.9/21. Patient was given 2U of PRBC overnight, 1L NS bolus. Transferred to [facility] for further monitoring. Blood pressure stable. Denies fever, chills, NV.     T(C): 36.7 (09-01-21 @ 05:15), Max: 36.8 (08-31-21 @ 18:35)  HR: 90 (09-01-21 @ 05:15) (64 - 106)  BP: 118/68 (09-01-21 @ 05:15) (56/23 - 140/49)  RR: 18 (09-01-21 @ 05:15) (12 - 19)  SpO2: 98% (09-01-21 @ 05:15) (97% - 100%)  Wt(kg): --    PE   RLE:  Skin intact; No ecchymosis/soft tissue swelling  Compartments soft; + TTP about hip. No TTP to knee/leg/ankle/foot   ROM lmited 2/2 pain   Motor intact GS/TA/FHL/EHL  SILT L3-S1  DP pulses +        81y Male with R intertrochanteric hip fracture     - Plan for OR today after 5pm with Dr Kyle with plan for R Hip IMN   - Pain control  - NPO/IVF   - Hold all DVT ppx today for procedure  - Urgent TTE ordered, please complete first this time AM  - Pending Cardiac clearance for surgery, team aware and will see the patient this AM  - Medical clearance to follow Cardiac recommendations. Please document clearance for the OR.  - FU AM preop labs  - EKG/CXR done  - Strict bedrest until OR  - NWB RLE  - Discussed with Dr OSEI who is aware and agrees with the above plan

## 2021-09-01 NOTE — CONSULT NOTE ADULT - SUBJECTIVE AND OBJECTIVE BOX
CARDIOLOGY CONSULT NOTE    Patient is a 81y Male with a known history of :  Closed fracture of both hips [S72.001A]    Closed fracture of right hip [S72.001A]      HPI:  80 yo M community ambulator with a PMH of CAD s/p CABG more than 12 years ago without issue (normal recent echo and stress test as below), HTN, HLD, COPD, CKD, orthostatic hypotension, occasional memory loss/confusion; presented to the ED s/p mechanical fall (got his foot got stuck and fell... witnessed by grandson). No head-strike or LOC.   Pt on ASA 81mg; head CT neg.   Sustained R hip fx and awaiting OR tonight.  Of note, pt s/p rapid response yesterday afternoon/evening with blood pressure of 50s/20s; H&H of 6.9/21.  Given 2U of PRBC overnight, 1L NS bolus.   Stable this AM; denied any cardiac complaints including CP.  Does c/o some mild hip pain.  ECG: sinus 69bpm; LVH  Echo today normal  Stress test 11/2020 normal with no ischemia.      REVIEW OF SYSTEMS:  CONSTITUTIONAL: No fever, weight loss, or fatigue  EYES: No eye pain, visual disturbances, or discharge  ENMT:  No difficulty hearing, tinnitus, vertigo; No sinus or throat pain  NECK: No pain or stiffness  BREASTS: No pain, masses, or nipple discharge  RESPIRATORY: No cough, wheezing, chills or hemoptysis; No shortness of breath  CARDIOVASCULAR: No chest pain, palpitations, dizziness, or leg swelling  GASTROINTESTINAL: No abdominal or epigastric pain. No nausea, vomiting, or hematemesis; No diarrhea or constipation. No melena or hematochezia.  GENITOURINARY: No dysuria, frequency, hematuria, or incontinence  NEUROLOGICAL: No headaches, memory loss, loss of strength, numbness, or tremors  SKIN: No itching, burning, rashes, or lesions   LYMPH NODES: No enlarged glands  ENDOCRINE: No heat or cold intolerance; No hair loss  MUSCULOSKELETAL: No joint pain or swelling; No muscle, back, or extremity pain  PSYCHIATRIC: No depression, anxiety, mood swings, or difficulty sleeping  HEME/LYMPH: No easy bruising, or bleeding gums  ALLERGY AND IMMUNOLOGIC: No hives or eczema    MEDICATIONS  (STANDING):  ascorbic acid 500 milliGRAM(s) Oral two times a day  budesonide 160 MICROgram(s)/formoterol 4.5 MICROgram(s) Inhaler 2 Puff(s) Inhalation two times a day  midodrine. 2.5 milliGRAM(s) Oral three times a day  pantoprazole    Tablet 40 milliGRAM(s) Oral before breakfast  sodium chloride 0.9%. 1000 milliLiter(s) (75 mL/Hr) IV Continuous <Continuous>    MEDICATIONS  (PRN):  oxyCODONE    IR 5 milliGRAM(s) Oral every 6 hours PRN Pain 4-10      ALLERGIES: No Known Allergies      FAMILY HISTORY:  No pertinent family history in first degree relatives        PHYSICAL EXAMINATION:  -----------------------------  T(C): 36.8 (09-01-21 @ 10:15), Max: 36.8 (08-31-21 @ 18:35)  HR: 86 (09-01-21 @ 10:15) (64 - 106)  BP: 96/64 (09-01-21 @ 10:15) (56/23 - 140/49)  RR: 18 (09-01-21 @ 10:15) (12 - 19)  SpO2: 97% (09-01-21 @ 10:15) (97% - 100%)      Constitutional: no acute distress.   Eyes: the conjunctiva exhibited no abnormalities and the eyelids demonstrated no xanthelasmas.   HEENT: normal oral mucosa, no oral pallor and no oral cyanosis.   Neck: normal jugular venous A waves present, normal jugular venous V waves present and no jugular venous ball A waves.   Pulmonary: no respiratory distress, normal respiratory rhythm and effort, no accessory muscle use and lungs were clear to auscultation bilaterally.   Cardiovascular: heart rate and rhythm were normal, normal S1 and S2 and no murmur, gallop, rub, heave or thrill are present.   Abdomen: soft, non-tender, no hepato-splenomegaly and no abdominal mass palpated.   Musculoskeletal: the gait could not be assessed..   Extremities: no clubbing of the fingernails, no localized cyanosis, no petechial hemorrhages and no ischemic changes.   Skin: normal skin color and pigmentation, no rash, no venous stasis, no skin lesions, no skin ulcer and no xanthoma was observed.   Psychiatric: oriented to person, place, and time, the affect was normal, the mood was normal and not feeling anxious.       LABS:   --------  09-01    131<L>  |  104  |  53<H>  ----------------------------<  117<H>  5.1   |  20<L>  |  2.70<H>    Ca    7.6<L>      01 Sep 2021 06:42  Phos  4.1     08-31  Mg     2.5     08-31    TPro  6.6  /  Alb  2.8<L>  /  TBili  0.3  /  DBili  x   /  AST  28  /  ALT  22  /  AlkPhos  84  08-31                         10.0   18.15 )-----------( 156      ( 01 Sep 2021 06:42 )             30.9     PT/INR - ( 01 Sep 2021 06:42 )   PT: 11.9 sec;   INR: 1.03 ratio         PTT - ( 01 Sep 2021 06:42 )  PTT:31.7 sec    trop        RADIOLOGY:  -----------------    ECG: sinus 69bpm; LVH    e< from: TTE Echo Complete w/o Contrast w/ Doppler (09.01.21 @ 09:01) >  Summary:   1. Normal global left ventricular systolic function.   2. There is mild concentric left ventricular hypertrophy.   3. Visually estimated LVEF was >60% but poor endocardial border definition precludes definitive systolic function assessment.   4. No evidence of mitral valve regurgitation.   5. Mild tricuspid regurgitation.   6. Mild pulmonic valve regurgitation.    < end of copied text >  < from: Nuclear Stress Test-Pharmacologic (11.15.20 @ 09:43) >  IMPRESSIONS:Normal Study  * The left ventricle was normal in size.  * Tracer uptake was homogeneous throughout the left  ventricle.  * Normal study; no evidence for myocardial infarction or  ischemia.  * Post-stress gated wall motion analysis was performed  (LVEF = 67 %;LVEDV = 45 ml.), revealing normal LV  function.    < end of copied text >

## 2021-09-01 NOTE — PRE-OP CHECKLIST - PATIENT'S PERSONAL PROPERTY GIVEN TO
Health Maintenance Due   Topic Date Due   • Varicella Vaccine (1 of 2 - 13+ 2-dose series) 02/27/2011   • HPV (Female) Vaccine (1 - Female 3-dose series) 02/27/2013       Patient is due for topics as listed above but is not proceeding with Immunization(s) HPV and Varicella at this time.            family member

## 2021-09-02 NOTE — CONSULT NOTE ADULT - ATTENDING COMMENTS
81M PMH orthostatic hypotension chronically on midodrine TID with baseline BP 90s/40s per daughter, CAD s/p CABG >12 years ago, HLD, COPD, CKD 3 (baseline Cr 2.3-2.9), glaucoma with blindness, occasional memory loss/confusion ?dementia presents to the ED on 8/31 s/p mechanical fall. Admitted with an acute, comminuted, displaced right femoral intertrochanteric fracture with noted hematoma throughout the surrounding musculature. s/p RRT 8/31 for hypotension SBP 50s with noted Hb drop 8.1-> 6.9. Given 250mL IVF bolus and 2units pRBC transfusion. Taken to OR night of 9/1 s/p Intramedullary nailing of right femur with reported 100mL blood loss intraoperatively and intraoperative hypotension per ortho. Post op with recurrent hypotension, hypothermia, AMS. Transferred to ICU for hypotension/shock state. Found to be anemic Hb 6.2, lactate 5.2, Cr 2.9, pH 7.2 -> 7.0, HCO3: 10. Urgently intubated with hemodynamic instability and lethargy.     DX: hemorrhagic shock, acute on chronic hypotension, acute blood loss anemia, severe metabolic / lactic acidosis, acute metabolic encephalopathy, acute (type 4) respiratory failure requiring intubation    NEURO  started on fentanyl drip for sedation but also to help with post operative pain  episodes of awakening agitated and restless, pulling at lines while on fentanyl drip, propofol added  if BP becomes an issue and give trial of ketamine a try to help with pain, sedation and BP    CV  shock: bedside echo with preserved LV fxn but small and virtual IVC most consistent with volume depletion likely from blood loss  s/p 1L IVF bolus  2 units pRBC STAT to be transfused  on levophed and phenylephrine for BP support  pt noted on arrival to ICU to have dusky cool extremities and digits bilaterally, pulses still palpable    PULM  mechanical ventilation  trend ABG  RR increased with underlying metabolic acidosis    HEME  acute post procedural blood loss anemia  prbc transfusion; baseline Hb approximately 10  still with noted bleeding/oozing from staple sites  ortho at bedside and aware of bleeding, new dressing changed performed by ortho  CT pelvis and RLE to evaluate for hematoma ordered STAT: however pt hemodynamically unstable at present time and will need stabilization prior to transport for further imaging    RENAL  metabolic acidosis  cont supportive care with vasopressor therapy and blood products to maintain perfusion  s/p 2 amps biarb push and started on bicarb infusion for severe metabolic acidosis  repeat ABG and chemistries  an inserted      GEN  daughter updated on pts transfer to ICU and his critical condition with shock state and hemorrhagic anemia  daughter aware of the need for more blood transfusions due to worsening anemia  she was made aware of pt's low BP and need for vasoactive agents to sustain and maintain BP  full code  DVT prophylaxis with bilateral compression devices due to post operative bleeding  prognosis is extremely guarded 81M PMH orthostatic hypotension chronically on midodrine TID with baseline BP 90s/40s per daughter, CAD s/p CABG >12 years ago, HLD, COPD, CKD 3 (baseline Cr 2.3-2.9), glaucoma with blindness, occasional memory loss/confusion ?dementia presents to the ED on 8/31 s/p mechanical fall. Admitted with an acute, comminuted, displaced right femoral intertrochanteric fracture with noted hematoma throughout the surrounding musculature. s/p RRT 8/31 for hypotension SBP 50s with noted Hb drop 8.1-> 6.9. Given 250mL IVF bolus and 2units pRBC transfusion. Taken to OR night of 9/1 s/p Intramedullary nailing of right femur with reported 100mL blood loss intraoperatively and intraoperative hypotension per ortho. Post op with recurrent hypotension, hypothermia, AMS. Transferred to ICU for hypotension/shock state. Found to be anemic Hb 6.2, lactate 5.2, Cr 2.9, pH 7.2 -> 7.0, HCO3: 10. Urgently intubated with hemodynamic instability and lethargy.     DX: hemorrhagic shock, acute on chronic hypotension, acute blood loss anemia, severe metabolic / lactic acidosis, acute metabolic encephalopathy, acute (type 4) respiratory failure requiring intubation    NEURO  started on fentanyl drip for sedation but also to help with post operative pain  episodes of awakening agitated and restless, pulling at lines while on fentanyl drip, propofol added  if BP becomes an issue and give trial of ketamine a try to help with pain, sedation and BP    CV  shock: bedside echo with preserved LV fxn but small and virtual IVC most consistent with volume depletion likely from blood loss  s/p 1L IVF bolus  2 units pRBC STAT to be transfused  on levophed and phenylephrine for BP support  pt noted on arrival to ICU to have dusky cool extremities and digits bilaterally, pulses still palpable    PULM  mechanical ventilation  trend ABG  RR increased with underlying metabolic acidosis    HEME  acute post procedural blood loss anemia  prbc transfusion; baseline Hb approximately 10  still with noted bleeding/oozing from staple sites  ortho at bedside and aware of bleeding, new dressing changed performed by ortho  CT pelvis and RLE to evaluate for hematoma ordered STAT: however pt hemodynamically unstable at present time and will need stabilization prior to transport for further imaging    RENAL  metabolic acidosis  cont supportive care with vasopressor therapy and blood products to maintain perfusion  s/p 2 amps biarb push and started on bicarb infusion for severe metabolic acidosis  repeat ABG and chemistries  an inserted    ID  r/o infectious causes of hypotension  check blood cx, check u cx, check sputum cx  cont with cefazolin for post procedural antibx prophylaxis       GEN  daughter updated on pts transfer to ICU and his critical condition with shock state and hemorrhagic anemia  daughter aware of the need for more blood transfusions due to worsening anemia  she was made aware of pt's low BP and need for vasoactive agents to sustain and maintain BP  full code  DVT prophylaxis with bilateral compression devices due to post operative bleeding  prognosis is extremely guarded

## 2021-09-02 NOTE — DIETITIAN INITIAL EVALUATION ADULT. - PHYSCIAL ASSESSMENT
BMI=22.3(08/31), 09/02, 1+ generalized edema noted/debilitated/other (specify) Limited nutrition focus physical due to vent status, edema

## 2021-09-02 NOTE — CHART NOTE - NSCHARTNOTEFT_GEN_A_CORE
: Bridget    Indication: hypotension, shock, hypoxia    EXAM:  [x] limited goal directed echo  [x] limited lung ultrasound    Findings:  1. LV with normal contractility, RV < LV size, IVC virtual and completely collapsing (IVC diameter 0.52cm)  2. bilateral a line predominance on lung ultrasound, + lung sliding

## 2021-09-02 NOTE — DIETITIAN INITIAL EVALUATION ADULT. - PROBLEM SELECTOR PLAN 1
Imaging: XR demonstates R hip fracture  Pain control  NWB R LE, bedrest  FU labs/imaging  Medical clearance/optimization for OR  Will discuss with attending

## 2021-09-02 NOTE — DIETITIAN INITIAL EVALUATION ADULT. - OTHER CALCULATIONS
I/O: 3986/1050(+[id number]). IBW:  67.1 kg  used for calculation of estimated needs.  Wt: 08/31, 64.7 kg  %IBW: 96%     UBW: 64.7 kg  % UBW:  100%, wt. gain of 7.4 kg since 08/31 noted

## 2021-09-02 NOTE — CHART NOTE - NSCHARTNOTEFT_GEN_A_CORE
Discussed findings of MRI with Dr. Lopez. Will begin patient on 24hr course of Decadron 10mg q8hr and please have patient walk with PT. If patient clinically improves and is able to walk with PT will plan for outpatient follow up with Dr. Lopez in his office. Patient seen bedside continues to be intubated, sedated, and requiring pressors.

## 2021-09-02 NOTE — DIETITIAN INITIAL EVALUATION ADULT. - ORAL INTAKE PTA/DIET HISTORY
Pt's daughter was @ bedside, pt c good appetite in general, followed Low sodium, no concentrated potassium, no concentrated phosphorus due to CKD.

## 2021-09-02 NOTE — DIETITIAN INITIAL EVALUATION ADULT. - PERTINENT MEDS FT
MEDICATIONS  (STANDING):  albumin human  5% IVPB 500 milliLiter(s) IV Intermittent once  chlorhexidine 0.12% Liquid 15 milliLiter(s) Oral Mucosa every 12 hours  chlorhexidine 2% Cloths 1 Application(s) Topical <User Schedule>  [id number]   Infusion. 0.5 MICROgram(s)/kG/Hr (3.24 mL/Hr) IV Continuous <Continuous>  hydrocortisone sodium succinate Injectable 100 milliGRAM(s) IV Push every 8 hours  ketamine Infusion. 0.25 mG/kG/Hr (1.62 mL/Hr) IV Continuous <Continuous>  midodrine. 15 milliGRAM(s) Oral three times a day  norepinephrine Infusion 0.05 MICROgram(s)/kG/Min (3.03 mL/Hr) IV Continuous <Continuous>  pantoprazole  Injectable 40 milliGRAM(s) IV Push daily  phenylephrine    Infusion 4 MICROgram(s)/kG/Min (48.5 mL/Hr) IV Continuous <Continuous>  piperacillin/tazobactam IVPB.. 3.375 Gram(s) IV Intermittent every 12 hours  propofol Infusion 5 MICROgram(s)/kG/Min (1.94 mL/Hr) IV Continuous <Continuous>  vasopressin Infusion 0.04 Unit(s)/Min (2.4 mL/Hr) IV Continuous <Continuous>    MEDICATIONS  (PRN):  midazolam Injectable 4 milliGRAM(s) IV Push every 4 hours PRN vent dyssynchrony

## 2021-09-02 NOTE — PROGRESS NOTE ADULT - SUBJECTIVE AND OBJECTIVE BOX
Orthopedics Post-op Check  POD 0 s/p R IMN    Patient was transported to CCU from PACU after patient was found to be hypotensive to 60s-80s SBP. Postop labs showed anemia of 7.3 so 2U pRBC were ordered. Patient seen and examined in CCU. He is moaning and complaining of pain, but otherwise unable to obtain ROS secondary to lethargy/mental status. Dressings noted to be saturated, especially proximal and distal dressings with leakage onto bed. New Aquacel dressings were applied at bedside and covered with ABDs to assist in preventing continued bed saturation.      Vital Signs Last 24 Hrs  T(C): 36.6 (09-01-21 @ 23:30), Max: 37.3 (09-01-21 @ 16:40)  T(F): 97.9 (09-01-21 @ 23:30), Max: 99.2 (09-01-21 @ 16:40)  HR: 92 (09-02-21 @ 00:15) (80 - 98)  BP: 99/70 (09-02-21 @ 00:15) (56/43 - 127/72)  BP(mean): 77 (09-02-21 @ 00:15) (56 - 77)  RR: 16 (09-02-21 @ 00:15) (12 - 18)  SpO2: 100% (09-02-21 @ 00:15) (94% - 100%)                        6.2    20.62 )-----------( 137      ( 02 Sep 2021 00:05 )             18.5     01 Sep 2021 23:11    132    |  107    |  52     ----------------------------<  196    5.4     |  14     |  2.99     Ca    7.7        01 Sep 2021 23:11  Phos  4.1       31 Aug 2021 18:41  Mg     2.5       31 Aug 2021 18:41    TPro  6.6    /  Alb  2.8    /  TBili  0.3    /  DBili  x      /  AST  28     /  ALT  22     /  AlkPhos  84     31 Aug 2021 15:33    PT/INR - ( 01 Sep 2021 06:42 )   PT: 11.9 sec;   INR: 1.03 ratio         PTT - ( 01 Sep 2021 06:42 )  PTT:31.7 sec    Exam:  Gen: In CCU, requiring Norepi ggt, painful distress, lethargic, unable to participate in exam  Dressings saturated, worst proximally as well as distal dressing. New dressings applied. Upon evaluation of the incisions, proximal incision with continued oozing from distal aspect of the incision. Distal incision without oozing appreciated.   Unable to evaluate sensation and motor secondary to lethargy/mental status.   +DP/PT 2+  Unable to evaluate calf TTP.  Thigh is swollen but compartments soft and compressible    A/P:  81yMale Stable POD 0  s/p R IMN    -CCU and medical management appreciated  -FU AM labs; postop labs revealed anemia to H&H 7.3 associated with profound hypotension; 2U pRBC ordered.  -Patient on Norepi ggt per CCU team due to severe hypotension  -While blood loss during procedure likely influential on patient's hypotension, in combination with pre-operative soft blood pressures, oozing from incisional sites unlikely to be main contributor to patient's severe hypotension.   -CT RLE ordered by CCU to evaluate for hematoma/active bleed  -WBAT; PT/OT once medically stable  -Pain control PRN  -Ppx ABX x24hrs: Ancef  -DVT PE ppx: Planned for Heparin SQ to start tomorrow morning, however orders cancelled and will re-evaluate in setting of anemia with severe hypotension  -Incentive spirometry  -Home medications continued  -Dispo pending PT evaluation  -Discussed with attending Dr. Kyle who agrees with plan

## 2021-09-02 NOTE — CONSULT NOTE ADULT - SUBJECTIVE AND OBJECTIVE BOX
Lab Results:  CBC  CBC Full  -  ( 02 Sep 2021 04:50 )  WBC Count : 27.61 K/uL  RBC Count : 2.81 M/uL  Hemoglobin : 8.4 g/dL  Hematocrit : 25.4 %  Platelet Count - Automated : 123 K/uL  Mean Cell Volume : 90.4 fl  Mean Cell Hemoglobin : 29.9 pg  Mean Cell Hemoglobin Concentration : 33.1 gm/dL  Auto Neutrophil # : x  Auto Lymphocyte # : x  Auto Monocyte # : x  Auto Eosinophil # : x  Auto Basophil # : x  Auto Neutrophil % : x  Auto Lymphocyte % : x  Auto Monocyte % : x  Auto Eosinophil % : x  Auto Basophil % : x    .		Differential:	[] Automated		[] Manual  Chemistry                        8.4    27.61 )-----------( 123      ( 02 Sep 2021 04:50 )             25.4     09-02    137  |  112<H>  |  35<H>  ----------------------------<  106<H>  4.5   |  10<LL>  |  1.68<H>    Ca    6.2<LL>      02 Sep 2021 00:05  Phos  3.5     09-02  Mg     1.6     09-02    TPro  3.0<L>  /  Alb  1.0<L>  /  TBili  0.4  /  DBili  x   /  AST  14<L>  /  ALT  11<L>  /  AlkPhos  31<L>  09-02    LIVER FUNCTIONS - ( 02 Sep 2021 00:05 )  Alb: 1.0 g/dL / Pro: 3.0 gm/dL / ALK PHOS: 31 U/L / ALT: 11 U/L / AST: 14 U/L / GGT: x           PT/INR - ( 02 Sep 2021 00:05 )   PT: 14.2 sec;   INR: 1.24 ratio    PTT - ( 02 Sep 2021 00:05 )  PTT:31.1 sec      ABG - ( 02 Sep 2021 02:08 )  pH, Arterial: x     pH, Blood: 7.04  /  pCO2: 39    /  pO2: 315   / HCO3: 10    / Base Excess: -19.2 /  SaO2: 99          Lactate, Blood: 5.2 mmol/L (09-02-21 @ 00:05)       81 year old man with a PMHx of CKD, orthostatic hypotension, 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 On workup found to have an acute, comminuted, displaced right femoral intertrochanteric fracture with increased varus angulation, as seen previously. Hematoma throughout the surrounding musculature. Patient became hypotensive and hemoglobin dropped to 6.9 and required 2 units of blood yesterday. Patient went to the OR on 9/1 and is s/p Intramedullary nailing of right femur.     ICU consulted to evaluate patient in PACU for hypotension. Per anesthesiologist pt hypotensive SBP 70-90s and is baseline on midodrine for orthostatic hypotension. Post op pt was extubated and taken to PACU with reported agitation for which he was given sedation. Started on phenylephrine drip in PACU.    Patient admitted to the ICU for further management. On arrival to ICU pt noted to have fresh bright red blood saturating sheets and chucks underneath him. Prior to leaving PACU labs were sent on patient with Hb 7.3 down from 10 pre-op. Repeat STAT CBC, ABG, and labs sent on arrival to ICU with Hb 6.2 less than 1 hr after PACU labs were sent. STAT pRBC x2 units ordered for patient. Bleeding noted to be draining from dressing sites. Dressing also soaked with blood. Urgent CT pelvis and R LE ordered to r/o hematoma, left thigh significantly larger than right thigh but still soft. Pedal pulses intact bilaterally however bilateral feet cold and dusky. Finger tips also noted to be dusky on arrival to ICU. Ortho also called and at bedside. Dressing change done by ortho with noted oozing from upper incision staples and lower incision staples. BP on phenylephrine dropped to SBP 50-60s. Pt lethargic. Noted to be briefly hypoxic on nasal cannula to the 60s. Placed on non rebreather. ABG 7.22/30/86/12/94% on nasal cannula. Lactate 5.2 With hemodynamic instability, metabolic acidosis, lethargy, acute post procedural anemia with active bleeding the decision was made to intubate patient for airway protection.     The daughter was Clary Dillard [phone] was updated on her father's condition.     ROS: unable to perform at this time.     Exam:   Gen: lethargic, unable to participate in exam  Lungs: clear to ascultation, bilateral air entry   Heart: S1, S2, no murmur heard   Abdomen soft, non tender, non distended.   Right leg:  Dressings saturated, worst proximally as well as distal dressing. New dressings applied. Upon evaluation of the incisions, proximal incision with continued oozing from distal aspect of the incision. Distal incision without oozing appreciated.   Unable to evaluate sensation and motor secondary to lethargy/mental status.   +DP/PT 2+  Unable to evaluate calf TTP.  Thigh is swollen but compartments soft and compressible      Lab Results:  CBC  CBC Full  -  ( 02 Sep 2021 04:50 )  WBC Count : 27.61 K/uL  RBC Count : 2.81 M/uL  Hemoglobin : 8.4 g/dL  Hematocrit : 25.4 %  Platelet Count - Automated : 123 K/uL  Mean Cell Volume : 90.4 fl  Mean Cell Hemoglobin : 29.9 pg  Mean Cell Hemoglobin Concentration : 33.1 gm/dL  Auto Neutrophil # : x  Auto Lymphocyte # : x  Auto Monocyte # : x  Auto Eosinophil # : x  Auto Basophil # : x  Auto Neutrophil % : x  Auto Lymphocyte % : x  Auto Monocyte % : x  Auto Eosinophil % : x  Auto Basophil % : x    .		Differential:	[] Automated		[] Manual  Chemistry                        8.4    27.61 )-----------( 123      ( 02 Sep 2021 04:50 )             25.4     09-02    137  |  112<H>  |  35<H>  ----------------------------<  106<H>  4.5   |  10<LL>  |  1.68<H>    Ca    6.2<LL>      02 Sep 2021 00:05  Phos  3.5     09-02  Mg     1.6     09-02    TPro  3.0<L>  /  Alb  1.0<L>  /  TBili  0.4  /  DBili  x   /  AST  14<L>  /  ALT  11<L>  /  AlkPhos  31<L>  09-02    LIVER FUNCTIONS - ( 02 Sep 2021 00:05 )  Alb: 1.0 g/dL / Pro: 3.0 gm/dL / ALK PHOS: 31 U/L / ALT: 11 U/L / AST: 14 U/L / GGT: x           PT/INR - ( 02 Sep 2021 00:05 )   PT: 14.2 sec;   INR: 1.24 ratio    PTT - ( 02 Sep 2021 00:05 )  PTT:31.1 sec    ABG - ( 02 Sep 2021 02:08 )  pH, Arterial: x     pH, Blood: 7.04  /  pCO2: 39    /  pO2: 315   / HCO3: 10    / Base Excess: -19.2 /  SaO2: 99        Lactate, Blood: 5.2 mmol/L (09-02-21 @ 00:05)       81 year old man with a PMHx of CKD, orthostatic hypotension, 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 On workup found to have an acute, comminuted, displaced right femoral intertrochanteric fracture with increased varus angulation, as seen previously. Hematoma throughout the surrounding musculature. Patient became hypotensive and hemoglobin dropped to 6.9 and required 2 units of blood yesterday. Patient went to the OR on 9/1 and is s/p Intramedullary nailing of right femur.     ICU consulted to evaluate patient in PACU for hypotension. Per anesthesiologist pt hypotensive SBP 70-90s and is baseline on midodrine for orthostatic hypotension. Post op pt was extubated and taken to PACU with reported agitation for which he was given sedation. Started on phenylephrine drip in PACU.    Patient admitted to the ICU for further management. On arrival to ICU pt noted to have fresh bright red blood saturating sheets and chucks underneath him. Prior to leaving PACU labs were sent on patient with Hb 7.3 down from 10 pre-op. Repeat STAT CBC, ABG, and labs sent on arrival to ICU with Hb 6.2 less than 1 hr after PACU labs were sent. STAT pRBC x2 units ordered for patient. Bleeding noted to be draining from dressing sites. Dressing also soaked with blood. Urgent CT pelvis and R LE ordered to r/o hematoma, left thigh significantly larger than right thigh but still soft. Pedal pulses intact bilaterally however bilateral feet cold and dusky. Finger tips also noted to be dusky on arrival to ICU. Ortho also called and at bedside. Dressing change done by ortho with noted oozing from upper incision staples and lower incision staples. BP on phenylephrine dropped to SBP 50-60s. Pt lethargic. Noted to be briefly hypoxic on nasal cannula to the 60s. Placed on non rebreather. ABG 7.22/30/86/12/94% on nasal cannula. Lactate 5.2 With hemodynamic instability, metabolic acidosis, lethargy, acute post procedural anemia with active bleeding the decision was made to intubate patient for airway protection.     The daughter was Clary Dillard [phone] was updated on her father's condition.     ROS: unable to perform at this time.     Exam:   Gen: lethargic, unable to participate in exam  Lungs: clear to ascultation, bilateral air entry   Heart: S1, S2, no murmur heard   Abdomen soft, non tender, non distended.   Right leg:  Dressings saturated, worst proximally as well as distal dressing. New dressings applied by ortho.   Pedal pulses intact bilaterally however bilateral feet cold and dusky. Finger tips also noted to be dusky   Unable to evaluate sensation and motor secondary to lethargy/mental status.   +DP/PT 2+  Thigh is swollen but compartments soft and compressible      Lab Results:  CBC  CBC Full  -  ( 02 Sep 2021 04:50 )  WBC Count : 27.61 K/uL  RBC Count : 2.81 M/uL  Hemoglobin : 8.4 g/dL  Hematocrit : 25.4 %  Platelet Count - Automated : 123 K/uL  Mean Cell Volume : 90.4 fl  Mean Cell Hemoglobin : 29.9 pg  Mean Cell Hemoglobin Concentration : 33.1 gm/dL  Auto Neutrophil # : x  Auto Lymphocyte # : x  Auto Monocyte # : x  Auto Eosinophil # : x  Auto Basophil # : x  Auto Neutrophil % : x  Auto Lymphocyte % : x  Auto Monocyte % : x  Auto Eosinophil % : x  Auto Basophil % : x    .		Differential:	[] Automated		[] Manual  Chemistry                        8.4    27.61 )-----------( 123      ( 02 Sep 2021 04:50 )             25.4     09-02    137  |  112<H>  |  35<H>  ----------------------------<  106<H>  4.5   |  10<LL>  |  1.68<H>    Ca    6.2<LL>      02 Sep 2021 00:05  Phos  3.5     09-02  Mg     1.6     09-02    TPro  3.0<L>  /  Alb  1.0<L>  /  TBili  0.4  /  DBili  x   /  AST  14<L>  /  ALT  11<L>  /  AlkPhos  31<L>  09-02    LIVER FUNCTIONS - ( 02 Sep 2021 00:05 )  Alb: 1.0 g/dL / Pro: 3.0 gm/dL / ALK PHOS: 31 U/L / ALT: 11 U/L / AST: 14 U/L / GGT: x           PT/INR - ( 02 Sep 2021 00:05 )   PT: 14.2 sec;   INR: 1.24 ratio    PTT - ( 02 Sep 2021 00:05 )  PTT:31.1 sec    ABG - ( 02 Sep 2021 02:08 )  pH, Arterial: x     pH, Blood: 7.04  /  pCO2: 39    /  pO2: 315   / HCO3: 10    / Base Excess: -19.2 /  SaO2: 99        Lactate, Blood: 5.2 mmol/L (09-02-21 @ 00:05)       81 year old man with a PMHx of CKD, orthostatic hypotension, 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 On workup found to have an acute, comminuted, displaced right femoral intertrochanteric fracture with increased varus angulation, as seen previously. Hematoma throughout the surrounding musculature. Patient became hypotensive and hemoglobin dropped to 6.9 and required 2 units of blood yesterday. Patient went to the OR on 9/1 and is s/p Intramedullary nailing of right femur.     ICU consulted to evaluate patient in PACU for hypotension. Per anesthesiologist pt hypotensive SBP 70-90s and is baseline on midodrine for orthostatic hypotension. Post op pt was extubated and taken to PACU with reported agitation for which he was given sedation. Started on phenylephrine drip in PACU.    Patient admitted to the ICU for further management. On arrival to ICU pt noted to have fresh bright red blood saturating sheets and chucks underneath him. Prior to leaving PACU labs were sent on patient with Hb 7.3 down from 10 pre-op. Repeat STAT CBC, ABG, and labs sent on arrival to ICU with Hb 6.2 less than 1 hr after PACU labs were sent. STAT pRBC x2 units ordered for patient. Bleeding noted to be draining from dressing sites. Dressing also soaked with blood. Urgent CT pelvis and R LE ordered to r/o hematoma, left thigh significantly larger than right thigh but still soft. Pedal pulses intact bilaterally however bilateral feet cold and dusky. Finger tips also noted to be dusky on arrival to ICU. Ortho also called and at bedside. Dressing change done by ortho with noted oozing from upper incision staples and lower incision staples. BP on phenylephrine dropped to SBP 50-60s. Pt lethargic. Noted to be briefly hypoxic on nasal cannula to the 60s. Placed on non rebreather. ABG 7.22/30/86/12/94% on nasal cannula. Lactate 5.2 With hemodynamic instability, metabolic acidosis, lethargy, acute post procedural anemia with active bleeding the decision was made to intubate patient for airway protection.     The daughter was Clary Dillard [phone] was updated on her father's condition.     ROS: unable to perform at this time.     Exam:   Gen: lethargic, unable to participate in exam  Lungs: clear to ascultation, bilateral air entry   Heart: S1, S2, no murmur heard   Abdomen soft, non tender, non distended.   Right leg:  Dressings saturated, worst proximally as well as distal dressing. New dressings applied by ortho.   Pedal pulses intact bilaterally however bilateral feet cold and dusky. Finger tips also noted to be dusky   Unable to evaluate sensation and motor secondary to lethargy/mental status.   +DP/PT 2+  Thigh is swollen but compartments soft and compressible      Lab Results:  CBC  CBC Full  -  ( 02 Sep 2021 04:50 )  WBC Count : 27.61 K/uL  RBC Count : 2.81 M/uL  Hemoglobin : 8.4 g/dL  Hematocrit : 25.4 %  Platelet Count - Automated : 123 K/uL  Mean Cell Volume : 90.4 fl  Mean Cell Hemoglobin : 29.9 pg  Mean Cell Hemoglobin Concentration : 33.1 gm/dL  Auto Neutrophil # : x  Auto Lymphocyte # : x  Auto Monocyte # : x  Auto Eosinophil # : x  Auto Basophil # : x  Auto Neutrophil % : x  Auto Lymphocyte % : x  Auto Monocyte % : x  Auto Eosinophil % : x  Auto Basophil % : x    .		Differential:	[] Automated		[] Manual  Chemistry                        8.4    27.61 )-----------( 123      ( 02 Sep 2021 04:50 )             25.4     09-02    137  |  112<H>  |  35<H>  ----------------------------<  106<H>  4.5   |  10<LL>  |  1.68<H>    Ca    6.2<LL>      02 Sep 2021 00:05  Phos  3.5     09-02  Mg     1.6     09-02    TPro  3.0<L>  /  Alb  1.0<L>  /  TBili  0.4  /  DBili  x   /  AST  14<L>  /  ALT  11<L>  /  AlkPhos  31<L>  09-02    LIVER FUNCTIONS - ( 02 Sep 2021 00:05 )  Alb: 1.0 g/dL / Pro: 3.0 gm/dL / ALK PHOS: 31 U/L / ALT: 11 U/L / AST: 14 U/L / GGT: x           PT/INR - ( 02 Sep 2021 00:05 )   PT: 14.2 sec;   INR: 1.24 ratio    PTT - ( 02 Sep 2021 00:05 )  PTT:31.1 sec    ABG - ( 02 Sep 2021 02:08 )  pH, Arterial: x     pH, Blood: 7.04  /  pCO2: 39    /  pO2: 315   / HCO3: 10    / Base Excess: -19.2 /  SaO2: 99        Lactate, Blood: 5.2 mmol/L (09-02-21 @ 00:05)

## 2021-09-02 NOTE — CHART NOTE - NSCHARTNOTEFT_GEN_A_CORE
Called to evaluate patient in PACU for hypotension    Per anesthesiologist pt hypotensive SBP 70-90s and is baseline on midodrine for orthostatic hypotension. Post op pt was extubated and taken to PACU with reported agitation for which he was given sedation. Started on phenylephrine drip in PACU.    On arrival to ICU pt noted to have fresh bright red blood saturating sheets and chucks underneath him. Prio to leaving PACU labs were sent on patient with Hb 7.3. Repeat STAT CBC, ABG, and labs sent on arrival to ICU with Hb 6.2 less than 1 hr after PACU labs were sent.    STAT pRBC x2 units ordered for patient. Bleeding noted to be draining from dressing sites. Dressing also soaked with blood. Urgent CT pelvis and R LE ordered to r/o hematoma, left thigh significantly larger than right thigh but still soft. Pedal pulses intact bilaterally however bilateral feet cold and dusky. Finger tips also noted to be dusky on arrival to ICU. BP on phenylephrine dropped to SBP 50-60s. Pt lethargic. Noted to be briefly hypoxic on nasal cannula to the 60s. Placed on non rebreather. ABG 7.22/30/86/12/94% on nasal cannula. Lactate 5.2 With hemodynamic instability, metabolic acidosis, lethargy, acute post procedural anemia with active bleeding the decision was made to intubate patient for airway protection. Called to evaluate patient in PACU for hypotension    Per anesthesiologist pt hypotensive SBP 70-90s and is baseline on midodrine for orthostatic hypotension. Post op pt was extubated and taken to PACU with reported agitation for which he was given sedation. Started on phenylephrine drip in PACU.    On arrival to ICU pt noted to have fresh bright red blood saturating sheets and chucks underneath him. Prior to leaving PACU labs were sent on patient with Hb 7.3 down from 10 pre-op. Repeat STAT CBC, ABG, and labs sent on arrival to ICU with Hb 6.2 less than 1 hr after PACU labs were sent.    STAT pRBC x2 units ordered for patient. Bleeding noted to be draining from dressing sites. Dressing also soaked with blood. Urgent CT pelvis and R LE ordered to r/o hematoma, left thigh significantly larger than right thigh but still soft. Pedal pulses intact bilaterally however bilateral feet cold and dusky. Finger tips also noted to be dusky on arrival to ICU. BP on phenylephrine dropped to SBP 50-60s. Pt lethargic. Noted to be briefly hypoxic on nasal cannula to the 60s. Placed on non rebreather. ABG 7.22/30/86/12/94% on nasal cannula. Lactate 5.2 With hemodynamic instability, metabolic acidosis, lethargy, acute post procedural anemia with active bleeding the decision was made to intubate patient for airway protection.    Ortho also called and at bedside. Dressing change done by ortho with noted oozing from upper incision staples and lower incision staples.

## 2021-09-02 NOTE — PROGRESS NOTE ADULT - SUBJECTIVE AND OBJECTIVE BOX
Orthopedics Post-op Check  POD 1 s/p R IMN    Patient seen and examined. Since postop check, he dropped O2 sats and Lactate increased to >7. After serial ABGs decision was made to intubate patient. Patient continues on pressors and is now tachy to 120s. His dressings continue to ooze. CCU spoke to patient's family who are aware of clinical status.     Vital Signs Last 24 Hrs  T(C): 36.6 (09-01-21 @ 23:30), Max: 37.3 (09-01-21 @ 16:40)  T(F): 97.9 (09-01-21 @ 23:30), Max: 99.2 (09-01-21 @ 16:40)  HR: 92 (09-02-21 @ 00:15) (80 - 98)  BP: 99/70 (09-02-21 @ 00:15) (56/43 - 127/72)  BP(mean): 77 (09-02-21 @ 00:15) (56 - 77)  RR: 16 (09-02-21 @ 00:15) (12 - 18)  SpO2: 100% (09-02-21 @ 00:15) (94% - 100%)                        6.2    20.62 )-----------( 137      ( 02 Sep 2021 00:05 )             18.5     01 Sep 2021 23:11    132    |  107    |  52     ----------------------------<  196    5.4     |  14     |  2.99     Ca    7.7        01 Sep 2021 23:11  Phos  4.1       31 Aug 2021 18:41  Mg     2.5       31 Aug 2021 18:41    TPro  6.6    /  Alb  2.8    /  TBili  0.3    /  DBili  x      /  AST  28     /  ALT  22     /  AlkPhos  84     31 Aug 2021 15:33    PT/INR - ( 01 Sep 2021 06:42 )   PT: 11.9 sec;   INR: 1.03 ratio         PTT - ( 01 Sep 2021 06:42 )  PTT:31.7 sec    Exam:  Gen: In CCU, requiring pressor support, intubated, unable to participate in exam  Dressings saturated, worst proximally as well as distal dressing. New dressings applied. Upon evaluation of the incisions, proximal incision with continued oozing from distal aspect of the incision. Distal incision without oozing appreciated.   Unable to evaluate sensation and motor secondary to sedation  +DP/PT 2+  Unable to evaluate calf TTP.  Thigh is swollen but compartments soft and compressible    A/P:  81yMale Stable POD 1  s/p R IMN    -CCU and medical management appreciated  -s/p 1U pRBC, 2nd Unit hanging now; Hgb after 1st unit improved to 8.4; FU AM Labs  -Patient on pressor support per CCU team  -While blood loss during procedure likely influential on patient's hypotension, in combination with pre-operative soft blood pressures, oozing from incisional sites unlikely to be main contributor to patient's severe hypotension.   -CT RLE ordered by CCU to evaluate for hematoma/active bleed, but unstable to go down for imaging  -WBAT; PT/OT once medically stable  -Pain control PRN  -Ppx ABX x24hrs: Ancef  -DVT ppx held due to severe hypotension requiring pressor support  -Incentive spirometry  -Home medications continued  -Dispo pending PT evaluation  -Will discuss with attending Dr. Kyle and advise if any changes to plan

## 2021-09-02 NOTE — CHART NOTE - NSCHARTNOTEFT_GEN_A_CORE
Pt critically ill, hemodynamically unstable, required emergent central line and arterial line placement    Pt had phenylephrine infusing through left peripheral forearm IV from PACU. Pt was started on levophed with refractory hypotension. While awaiting confirmation of ceentral line placement with CXR Pt post intubation woke up on fentanyl drip attempting to pull at lines and sitting up and thrashing about. Given 2mg versed push for sedation and started on propofol drip.    called by nurse to bedside with concerns for vasopressor infiltration into left forearm (levo and lianet running through IV per nurse). IV site indurated but soft with surrounding ecchymosis. No blistering, no denuding of skin. +Blood aspirated easily and withdrawn from IV, IV removed, 5mg phentolamine injected around surround tissues and IV insertion site. Warm compresses placed to site.

## 2021-09-02 NOTE — PROGRESS NOTE ADULT - ASSESSMENT
81 year old man with a PMHx of CKD, PCKD, orthostatic hypotension on midodrine, CABG, COPD, p/w fall and right femoral intertrochanteric fracture with hematoma. Pt s/p OR w/ IM nail on 9/1 and tx to ICU in shock, respiratory failure, LAUREN.    POCUS: LV fxn hyperdynamic, underfilled cavity, gross normal valves, normal rv size/fxn, no effusion, small IVC, A-line lungs, no pleffs, no free fluid in abdomen    Neuro  cont ketamine/fent for analgesia/sedation  on prn versed  titrate as per sedation scale    Pulm  cont vent support  titrate fio2/peep for sat>90%    CVS  severe shock cont triple pressors  had some hemorrhagic component w/ good BP response  likely has component from sedation and from his baseline autonomic instability (on midodrine at home for OH)  maintain map>65  cont stress dose steroids  trop mildly inc from demand ischemia  ekg w/o ischemia    Renal  ckd w/ like LAUREN now  monitor UO and electrolytes  d/c bicarb gtt as ph improved    Heme  s/p total 5 prbc  tx 1 plt given plt rapid drop from consumption  when stable for trnsport will send CT A/p and thigh    Ortho  cont f/u ortho reccs regarding postop care  DVT ppx w/ SCD given active bleeding    GOC: Informed daughter who is decision maker and at bedside that pt is very critical given his severe shock. WIll cont aggressive care given his good functional status up until this fall         Problem: Physical Therapy Goal  Goal: Physical Therapy Goal  Goals to be met by: 17     Patient will increase functional independence with mobility by performin. Supine to sit with Modified Benzie  2. Sit to supine with Modified Benzie  3. Sit to stand transfer with Modified Benzie  4. Bed to chair transfer with Modified Benzie using appropriate AD if needed  5. Gait x 150 feet with Modified Benzie using appropriate AD if needed.  6. Ascend/descend 16 stairs with bilateral Handrails Supervision       Recommendations: Pt to benefit from continued PT intervention to improve independence with functional mobility/ADL. Recommend d/c home c/ family. Further d/c recs pending progress c/ therapy.    Outcome: Ongoing (interventions implemented as appropriate)  Continue working toward goals.

## 2021-09-02 NOTE — DIETITIAN INITIAL EVALUATION ADULT. - PERTINENT LABORATORY DATA
09-02 Na140 mmol/L Glu 123 mg/dL<H> K+ 4.4 mmol/L Cr  3.23 mg/dL<H> BUN 53 mg/dL<H> 09-02 Phos 7.0 mg/dL<H> 09-02 Alb 1.6 g/dL<L>09-02  U/L<H>  U/L<H> Alkaline Phosphatase 53 U/L

## 2021-09-02 NOTE — PROGRESS NOTE ADULT - ASSESSMENT
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 Per daughter pt at baseline, AAOx2 at baseline. Daughter is translating for pt and reports pt got his foot got stuck and fell. Fall Witnessed by grandson. No head-strike or LOC. Pt on ASA 81mg. Denies AC use. Denies fever/chills, cough, CP, SOB, N/V, abdominal pain or headache. Pt unable to ambulate. Pt main complaint is right hip pain     Intubated and sedated    R hip fracture, POD 1  s/p R IMN  Management per ortho    Shock, septic v hypovolemic  elevated lactate and procal  cxr reviewed  On midodrine, steroids and pressors  empiric antiobitcs  continue to monitor  blood and urine cultures pending    Acute blood loss anemia  likely from bleeding from fracture, right thigh hematoma  CT pelvis and RLE pending   monitor cbc  transfuse to keep >7    Transaminitis  likly due to shock  trend LFTs    Tachycardia  Likely due to sepsis/shock    LAUREN, CKD4  cr stable, continue to monitor    Hyponatremia  hydrate    COPD  c/w inhaler    HLD  on statin     DVT ppx holding 80 yo M community ambulator w/PMH of CKD, orthostatic hypotension, CABG more than 12 years ago without issue, HLD, COPD, occasional memory loss/confusion presents to the ED s/p fall. Per daughter pt at baseline, AAOx2 at baseline. Daughter is translating for pt and reports pt got his foot got stuck and fell. Fall Witnessed by grandson. No head-strike or LOC. Pt on ASA 81mg. Denies AC use. Denies fever/chills, cough, CP, SOB, N/V, abdominal pain or headache. Pt unable to ambulate. Pt main complaint is right hip pain     Intubated and sedated    R hip fracture, POD 1  s/p R IMN  Management per ortho    Shock, septic v hypovolemic  elevated lactate and procal  cxr reviewed  On midodrine, steroids and pressors  empiric antiobitcs  continue to monitor  blood and urine cultures pending    Acute blood loss anemia  likely from bleeding from fracture, right thigh hematoma  CT pelvis and RLE pending   monitor cbc  transfuse to keep >7    Transaminitis  likly due to shock  trend LFTs    Tachycardia  Likely due to sepsis/shock    LAUREN, CKD4  likely due to rhabdo v shock  IV hydration  continue to monitor    Hyponatremia  resolved    COPD    HLD  on statin     DVT ppx holding

## 2021-09-02 NOTE — DIETITIAN INITIAL EVALUATION ADULT. - CONTINUE CURRENT NUTRITION CARE PLAN
if enteral feeding can be initiated, recommend Nepro @ 20 ml/hr and increase by 5 ml q 12 hours to goal of Nepro 30 ml/hr= 720 ml, 1296 calories, 58 grams protein, 526 ml free water/yes

## 2021-09-02 NOTE — DIETITIAN INITIAL EVALUATION ADULT. - OTHER INFO
As per chart review, pt s/p Intramedullary nailing of right femur, admitted to the ICU post op for management of acute blood loss anemia, shock, respiratory failure, encephalopathy, elevated lactate and metabolic acidosis, LAUREN, c critical status at present.

## 2021-09-02 NOTE — CONSULT NOTE ADULT - SUBJECTIVE AND OBJECTIVE BOX
NEPHROLOGY CONSULTATION    CHIEF COMPLAINT:  LAUREN/CKD      HPI:  Patient suffered and fall and hip fracture and underwent ORIF yesterday which was complicated by acute blood loss anemia, hypotension, lactic acidosis and respiratory failure.  Now intubated and on phenylephrine.  Hg improved with blood products.  Urine output has been sluggish since with slow rise in BUN/Cr which are chronically elevated.      ROS:  unable      PAST MEDICAL & SURGICAL HISTORY:  Glaucoma    Blindness of right eye    CAD (coronary artery disease)    HTN (hypertension)    Chronic kidney disease (CKD)    COPD without exacerbation    H/O coronary artery bypass surgery        SOCIAL HISTORY:  NA    FAMILY HISTORY:  NA      MEDICATIONS  (STANDING):  chlorhexidine 0.12% Liquid 15 milliLiter(s) Oral Mucosa every 12 hours  chlorhexidine 2% Cloths 1 Application(s) Topical <User Schedule>  fentaNYL   Infusion. 0.5 MICROgram(s)/kG/Hr (3.24 mL/Hr) IV Continuous <Continuous>  hydrocortisone sodium succinate Injectable 100 milliGRAM(s) IV Push every 8 hours  ketamine Infusion. 0.25 mG/kG/Hr (1.62 mL/Hr) IV Continuous <Continuous>  midodrine. 15 milliGRAM(s) Oral three times a day  norepinephrine Infusion 0.05 MICROgram(s)/kG/Min (3.03 mL/Hr) IV Continuous <Continuous>  pantoprazole  Injectable 40 milliGRAM(s) IV Push daily  phenylephrine    Infusion 4 MICROgram(s)/kG/Min (48.5 mL/Hr) IV Continuous <Continuous>  piperacillin/tazobactam IVPB.. 3.375 Gram(s) IV Intermittent every 12 hours  propofol Infusion 5 MICROgram(s)/kG/Min (1.94 mL/Hr) IV Continuous <Continuous>  vasopressin Infusion 0.04 Unit(s)/Min (2.4 mL/Hr) IV Continuous <Continuous>      PHYSICAL EXAMINATION:  T(F): 98.1 (21 @ 15:15)  HR: 79 (21 @ 15:15)  BP: 136/77 (21 @ 15:15)  RR: 30 (21 @ 15:15)  SpO2: 100% (21 @ 15:15)  Sedated, intubated  PERRLA, pink conjunctivae, no ptosis  Good dentition, no pharyngeal erythema  Neck non tender, no mass, no thyromegaly or nodules  Normal respiratory effort, lungs clear to auscultation  Heart with RRR, no murmurs or rubs, no peripheral edema  Abdomen soft, no masses, no organomegaly  Skin no rashes, ulcers or lesions, normal turgor and temperature      LABS:                        11.0   27.80 )-----------( 93       ( 02 Sep 2021 12:18 )             31.4     09-02    140  |  109<H>  |  53<H>  ----------------------------<  123<H>  4.4   |  17<L>  |  3.23<H>    Ca    6.7<L>      02 Sep 2021 09:31  Phos  7.0     09-  Mg     2.5     -    CPK 1030    Lactate 9.3 -> 5.0      Alejo 1.6<H>  /  DBili  x   /  AST  242<H>  /  ALT  149<H>  /  AlkPhos  53  -02    Urinalysis Basic - ( 02 Sep 2021 13:14 )  Color: Yellow / Appearance: Clear / S.020 / pH: x  Gluc: x / Ketone: Negative  / Bili: Negative / Urobili: Negative mg/dL   Blood: x / Protein: 100 mg/dL / Nitrite: Negative   Leuk Esterase: Moderate / RBC: 11-25 /HPF / WBC 11-25   Sq Epi: x / Non Sq Epi: Occasional / Bacteria: Moderate    ABG 7.24-35-82-14      RADIOLOGY:  Chest X-Ray personally reviewed and shows small left effusion      ASSESSMENT:  1.  LAUREN due to ischemic ATN, oliguric?  2.  CKD stage 4 with baseline Cr around 2.8  3.  Lactic acidosis due to shock, improving    PLAN:  Supportive care / hemodynamic support  Monitor daily BMP and fluid balance

## 2021-09-02 NOTE — PROGRESS NOTE ADULT - SUBJECTIVE AND OBJECTIVE BOX
HPI:  80 yo M community ambulator w/PMH of CKD, orthostatic hypotension, CABG more than 12 years ago without issue, HLD, COPD, occasional memory loss/confusion presents to the ED s/p fall. Per daughter pt at baseline, AAOx2 at baseline. Daughter is translating for pt and reports pt got his foot got stuck and fell. Fall Witnessed by grandson. No head-strike or LOC. Pt on ASA 81mg. Denies AC use. Denies fever/chills, cough, CP, SOB, N/V, abdominal pain or headache. Pt unable to ambulate. Pt main complaint is right hip pain     	No fever/chills, No photophobia/eye pain/changes in vision, No ear pain/sore throat/dysphagia, No chest pain/palpitations, no SOB/cough/wheeze/stridor, No abdominal pain, No N/V/D, no dysuria/frequency/discharge, No neck/back pain, no rash, no changes in neurological status/function.  (31 Aug 2021 17:35)      24 hr events:  Pt in severe shock on 3 pressors at very high doses this AM  bedside echo c/w underfilled cavity- and tx prbc even though hb reported as 9  had dramatic response to 1 prbc w/ decrease in pressor requirements      ROS:  unable due to sedation and intubation    ## Labs:  CBC:                        11.0   27.80 )-----------( 93       ( 02 Sep 2021 12:18 )             31.4     Chem:  09-02    140  |  109<H>  |  53<H>  ----------------------------<  123<H>  4.4   |  17<L>  |  3.23<H>    Ca    6.7<L>      02 Sep 2021 09:31  Phos  7.0     09-02  Mg     2.5     09-02    TPro  4.1<L>  /  Alb  1.6<L>  /  TBili  1.6<H>  /  DBili  x   /  AST  242<H>  /  ALT  149<H>  /  AlkPhos  53  09-02    Coags:  PT/INR - ( 02 Sep 2021 00:05 )   PT: 14.2 sec;   INR: 1.24 ratio         PTT - ( 02 Sep 2021 00:05 )  PTT:31.1 sec             ## Medications:  piperacillin/tazobactam IVPB.. 3.375 Gram(s) IV Intermittent every 12 hours    midodrine. 15 milliGRAM(s) Oral three times a day  norepinephrine Infusion 0.05 MICROgram(s)/kG/Min IV Continuous <Continuous>  phenylephrine    Infusion 4 MICROgram(s)/kG/Min IV Continuous <Continuous>      hydrocortisone sodium succinate Injectable 100 milliGRAM(s) IV Push every 8 hours  vasopressin Infusion 0.04 Unit(s)/Min IV Continuous <Continuous>      pantoprazole  Injectable 40 milliGRAM(s) IV Push daily    fentaNYL   Infusion. 0.5 MICROgram(s)/kG/Hr IV Continuous <Continuous>  ketamine Infusion. 0.25 mG/kG/Hr IV Continuous <Continuous>  midazolam Injectable 4 milliGRAM(s) IV Push every 4 hours PRN  propofol Infusion 5 MICROgram(s)/kG/Min IV Continuous <Continuous>      ## Vitals:  T(C): 37.2 (09-02-21 @ 11:30), Max: 37.5 (09-02-21 @ 10:00)  HR: 98 (09-02-21 @ 13:12) (75 - 135)  BP: 183/84 (09-02-21 @ 13:12) (40/25 - 193/47)  BP(mean): 112 (09-02-21 @ 13:12) (31 - 120)  RR: 30 (09-02-21 @ 13:12) (12 - 31)  SpO2: 100% (09-02-21 @ 13:12) (83% - 100%)  Wt(kg): --  Vent: Mode: AC/ CMV (Assist Control/ Continuous Mandatory Ventilation), RR (machine): 30, RR (patient): 30, TV (machine): 500, FiO2: 40, PEEP: 5, PIP: 27  ABG: ABG - ( 02 Sep 2021 08:30 )  pH, Arterial: x     pH, Blood: 7.24  /  pCO2: 35    /  pO2: 82    / HCO3: 14    / Base Excess: -12.0 /  SaO2: 96            09-01 @ 07:01  -  09-02 @ 07:00  --------------------------------------------------------  IN: 3986 mL / OUT: 1050 mL / NET: 2936 mL    09-02 @ 07:01  - 09-02 @ 13:58  --------------------------------------------------------  IN: 642.5 mL / OUT: 0 mL / NET: 642.5 mL        ## P/E:  Gen: sedated, intubated  Mouth: mmm  Neck: no sq emphysema  Lungs: b/l rhonchi  Heart: s1s2 reg tachy no murmur  Abd:+BS soft nontender, not distended  Ext: no LE edema, + R thigh swelling, no bleeding bandages dry, +soft, +pulses  Neuro: sedated    < from: Xray Chest 1 View- PORTABLE-Urgent (Xray Chest 1 View- PORTABLE-Urgent .) (08.31.21 @ 15:16) >    EXAM:  XR CHEST PORTABLE URGENT 1V                            PROCEDURE DATE:  08/31/2021          INTERPRETATION:  AP chest on August 31, 2021 at 3:04 PM. Patient has a right hip fracture.    Heart likely enlarged. Sternotomy again noted.    No acute lung finding.    No gross fracture.    Chest similar to November 11, 2020.    IMPRESSION: No acute finding or change.    --- End of Report ---            EDA EID MD; Attending Radiologist  This document has been electronically signed. Aug 31 2021  5:14PM    < end of copied text >       HPI:  82 yo M community ambulator w/PMH of CKD, orthostatic hypotension, CABG more than 12 years ago without issue, HLD, COPD, occasional memory loss/confusion presents to the ED s/p fall. Per daughter pt at baseline, AAOx2 at baseline. Daughter is translating for pt and reports pt got his foot got stuck and fell. Fall Witnessed by grandson. No head-strike or LOC. Pt on ASA 81mg. Denies AC use. Denies fever/chills, cough, CP, SOB, N/V, abdominal pain or headache. Pt unable to ambulate. Pt main complaint is right hip pain     	No fever/chills, No photophobia/eye pain/changes in vision, No ear pain/sore throat/dysphagia, No chest pain/palpitations, no SOB/cough/wheeze/stridor, No abdominal pain, No N/V/D, no dysuria/frequency/discharge, No neck/back pain, no rash, no changes in neurological status/function.  (31 Aug 2021 17:35)      24 hr events:  Pt in severe shock on 3 pressors at very high doses this AM  bedside echo c/w underfilled cavity- and tx prbc even though hb reported as 9  had dramatic response to 1 prbc w/ decrease in pressor requirements      ROS:  unable due to sedation and intubation    ## Labs:  CBC:                        11.0   27.80 )-----------( 93       ( 02 Sep 2021 12:18 )             31.4     Chem:  09-02    140  |  109<H>  |  53<H>  ----------------------------<  123<H>  4.4   |  17<L>  |  3.23<H>    Ca    6.7<L>      02 Sep 2021 09:31  Phos  7.0     09-02  Mg     2.5     09-02    TPro  4.1<L>  /  Alb  1.6<L>  /  TBili  1.6<H>  /  DBili  x   /  AST  242<H>  /  ALT  149<H>  /  AlkPhos  53  09-02    Coags:  PT/INR - ( 02 Sep 2021 00:05 )   PT: 14.2 sec;   INR: 1.24 ratio         PTT - ( 02 Sep 2021 00:05 )  PTT:31.1 sec             ## Medications:  piperacillin/tazobactam IVPB.. 3.375 Gram(s) IV Intermittent every 12 hours    midodrine. 15 milliGRAM(s) Oral three times a day  norepinephrine Infusion 0.05 MICROgram(s)/kG/Min IV Continuous <Continuous>  phenylephrine    Infusion 4 MICROgram(s)/kG/Min IV Continuous <Continuous>      hydrocortisone sodium succinate Injectable 100 milliGRAM(s) IV Push every 8 hours  vasopressin Infusion 0.04 Unit(s)/Min IV Continuous <Continuous>      pantoprazole  Injectable 40 milliGRAM(s) IV Push daily    fentaNYL   Infusion. 0.5 MICROgram(s)/kG/Hr IV Continuous <Continuous>  ketamine Infusion. 0.25 mG/kG/Hr IV Continuous <Continuous>  midazolam Injectable 4 milliGRAM(s) IV Push every 4 hours PRN  propofol Infusion 5 MICROgram(s)/kG/Min IV Continuous <Continuous>      ## Vitals:  T(C): 37.2 (09-02-21 @ 11:30), Max: 37.5 (09-02-21 @ 10:00)  HR: 98 (09-02-21 @ 13:12) (75 - 135)  BP: 183/84 (09-02-21 @ 13:12) (40/25 - 193/47)  BP(mean): 112 (09-02-21 @ 13:12) (31 - 120)  RR: 30 (09-02-21 @ 13:12) (12 - 31)  SpO2: 100% (09-02-21 @ 13:12) (83% - 100%)  Wt(kg): --  Vent: Mode: AC/ CMV (Assist Control/ Continuous Mandatory Ventilation), RR (machine): 30, RR (patient): 30, TV (machine): 500, FiO2: 40, PEEP: 5, PIP: 27  ABG: ABG - ( 02 Sep 2021 08:30 )  pH, Arterial: x     pH, Blood: 7.24  /  pCO2: 35    /  pO2: 82    / HCO3: 14    / Base Excess: -12.0 /  SaO2: 96            09-01 @ 07:01  -  09-02 @ 07:00  --------------------------------------------------------  IN: 3986 mL / OUT: 1050 mL / NET: 2936 mL    09-02 @ 07:01  - 09-02 @ 13:58  --------------------------------------------------------  IN: 642.5 mL / OUT: 0 mL / NET: 642.5 mL        ## P/E:  Gen: sedated, intubated  Mouth: mmm  Neck: no sq emphysema  Lungs: b/l rhonchi  Heart: s1s2 reg tachy no murmur  Abd:+BS soft nontender, not distended  Ext: no LE edema, + R thigh swelling, no bleeding bandages dry, +soft, +pulses, L forearm swelling, not necrotic  Neuro: sedated    < from: Xray Chest 1 View- PORTABLE-Urgent (Xray Chest 1 View- PORTABLE-Urgent .) (08.31.21 @ 15:16) >    EXAM:  XR CHEST PORTABLE URGENT 1V                            PROCEDURE DATE:  08/31/2021          INTERPRETATION:  AP chest on August 31, 2021 at 3:04 PM. Patient has a right hip fracture.    Heart likely enlarged. Sternotomy again noted.    No acute lung finding.    No gross fracture.    Chest similar to November 11, 2020.    IMPRESSION: No acute finding or change.    --- End of Report ---            EDA EID MD; Attending Radiologist  This document has been electronically signed. Aug 31 2021  5:14PM    < end of copied text >

## 2021-09-02 NOTE — PROGRESS NOTE ADULT - SUBJECTIVE AND OBJECTIVE BOX
ABDIRASHID QUIROGA  81y  Male    Patient is a 81y old  Male who presents with a chief complaint of Right hip fracture (01 Sep 2021 10:41)      INTERVAL HPI/OVERNIGHT EVENTS: Seen for follow up for medical management. Transferred to ICU for post-op hypotension. intubated and sedated    ROS:  Unable to obtain due to clinical status      PAST MEDICAL & SURGICAL HISTORY:  Glaucoma    Blindness of right eye    CAD (coronary artery disease)    HTN (hypertension)    Chronic kidney disease (CKD)    COPD without exacerbation    H/O coronary artery bypass surgery        REVIEW OF SYSTEMS:    CONSTITUTIONAL: hip pain   EYES: No eye pain, visual disturbances, or discharge  ENMT:  No difficulty hearing, tinnitus, vertigo; No sinus or throat pain  NECK: No pain or stiffness  BREASTS: No pain, masses, or nipple discharge  RESPIRATORY: No cough, wheezing, chills or hemoptysis; No shortness of breath  CARDIOVASCULAR: No chest pain, palpitations, dizziness, or leg swelling  GASTROINTESTINAL: No abdominal or epigastric pain. No nausea, vomiting, or hematemesis; No diarrhea or constipation. No melena or hematochezia.  GENITOURINARY: No dysuria, frequency, hematuria, or incontinence  NEUROLOGICAL: No headaches, memory loss, loss of strength, numbness, or tremors  SKIN: No itching, burning, rashes, or lesions   LYMPH NODES: No enlarged glands  ENDOCRINE: No heat or cold intolerance; No hair loss  MUSCULOSKELETAL: RLE swelling, surigcal site C/d/i  PSYCHIATRIC: No depression, anxiety, mood swings, or difficulty sleeping  HEME/LYMPH: No easy bruising, or bleeding gums  ALLERGY AND IMMUNOLOGIC: No hives or eczema      MEDICATIONS  (STANDING):  atorvastatin Oral Tab/Cap - Peds 40 milliGRAM(s) Oral Once  budesonide 160 MICROgram(s)/formoterol 4.5 MICROgram(s) Inhaler 2 Puff(s) Inhalation two times a day    MEDICATIONS  (PRN):      Allergies    No Known Allergies    Intolerances        SOCIAL HISTORY:    FAMILY HISTORY:  No pertinent family history in first degree relatives        Vital Signs Last 24 Hrs  T(C): 36.7 (31 Aug 2021 15:31), Max: 36.7 (31 Aug 2021 13:48)  T(F): 98 (31 Aug 2021 15:31), Max: 98 (31 Aug 2021 13:48)  HR: 78 (31 Aug 2021 15:31) (64 - 78)  BP: 113/56 (31 Aug 2021 16:42) (92/59 - 120/93)  BP(mean): --  RR: 19 (31 Aug 2021 15:31) (17 - 19)  SpO2: 98% (31 Aug 2021 15:31) (97% - 98%)    PHYSICAL EXAM:    GENERAL: NAD, well-groomed, well-developed  HEAD:  Atraumatic, Normocephalic  EYES: EOMI, PERRLA, conjunctiva and sclera clear  ENMT: No tonsillar erythema, exudates, or enlargement; Moist mucous membranes, Good dentition, No lesions  NECK: Supple, No JVD, Normal thyroid  NERVOUS SYSTEM:  Alert & Oriented X2, Good concentration; Motor Strength 5/5 B/L upper and lower extremities; DTRs 2+ intact and symmetric  CHEST/LUNG: Clear to percussion bilaterally; No rales, rhonchi, wheezing, or rubs  HEART: Regular rate and rhythm; No murmurs, rubs, or gallops  ABDOMEN: Soft, Nontender, Nondistended; Bowel sounds present  EXTREMITIES:  rt hip int rotated, and tender   LYMPH: No lymphadenopathy noted  SKIN: No rashes or lesions      LABS:           Reviewed        RADIOLOGY & ADDITIONAL STUDIES:  Reviwed

## 2021-09-02 NOTE — CONSULT NOTE ADULT - ASSESSMENT
81 year old man with a PMHx of CKD, orthostatic hypotension, 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 On workup found to have an acute, comminuted, displaced right femoral intertrochanteric fracture with hematoma. Patient went to the OR on 9/1 and is s/p Intramedullary nailing of right femur. Post op admitted to the ICU for management of acute blood loss anemia, hemorrhagic shock and metabolic acidosis.          Plan:        81 year old man with a PMHx of CKD, orthostatic hypotension, 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 On workup found to have an acute, comminuted, displaced right femoral intertrochanteric fracture with hematoma. Patient went to the OR on 9/1 and is s/p Intramedullary nailing of right femur. Post op admitted to the ICU for management of acute blood loss anemia, hemorrhagic shock and metabolic acidosis.          Plan:     Neuro:    Titrate sedation to RASS of 0 to -2      CV:         Pulmonary:   Continue with mechanical ventilation.       GI:   GI prophylaxis with pantoprazole   NPO      Endo:   Monitor fingersticks   Glycemic control with insulin sliding scale       Renal:   Terrell in place in a critical ill pt for accurate urinary monitoring   I and O hourly       ID:   Will continue Ancef for now.   Blood, urine and sputum cultures ordered.       Heme:   Repeat labs   DVT prophylaxis with SCD only   No chemical prophylaxis at this time.       Other/Disposition:   Will remain in the ICU.   Code Status: Full code    Case discussed with ICU attending.        81 year old man with a PMHx of CKD, orthostatic hypotension, 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 On workup found to have an acute, comminuted, displaced right femoral intertrochanteric fracture with hematoma. Patient went to the OR on 9/1 and is s/p Intramedullary nailing of right femur. Post op admitted to the ICU for management of acute blood loss anemia, hemorrhagic shock and metabolic acidosis.          Plan:     Neuro:  On sedation, will titrate sedation to RASS of 0 to -2  Fentanyl drip for pain control       CV:   On lianet and levophed.   Titrate to MAP of 65  Echo ordered      Pulmonary:   Continue with mechanical ventilation.   Vent settings adjusted.   ABG ordered      GI:   GI prophylaxis with pantoprazole   NPO      Endo:   Monitor fingersticks q 6 hrs   Glycemic control with insulin sliding scale       Renal:   Terrell in place in a critical ill pt for accurate urinary monitoring   I and O hourly       ID:   Will continue Ancef for now.   Blood, urine and sputum cultures ordered.       Heme:   Repeat labs   DVT prophylaxis with SCD only   No chemical prophylaxis at this time.       Other/Disposition:   Will remain in the ICU.   Code Status: Full code    Case discussed with ICU attending.        81 year old man with a PMHx of CKD, orthostatic hypotension, 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 On workup found to have an acute, comminuted, displaced right femoral intertrochanteric fracture with hematoma. Patient went to the OR on 9/1 and is s/p Intramedullary nailing of right femur. Post op admitted to the ICU for management of acute blood loss anemia, shock, elevated lactate and metabolic acidosis.        Plan:     Neuro:  On sedation, will titrate sedation to RASS of 0 to -2  Fentanyl drip for pain control       CV:   On lianet and levophed drips  Titrate to MAP of 65  Echo ordered      Pulmonary:   Continue with mechanical ventilation.   Vent settings adjusted.   ABG ordered  Suction as needed       GI:   GI prophylaxis with pantoprazole   NPO      Endo:   Monitor fingersticks q 6 hrs   Glycemic control with insulin sliding scale as needed      Renal:   Terrell in place in a critical ill pt for accurate urinary monitoring   I and O hourly   Monitor and correct electrolytes      ID:   Will continue Ancef for now.   Blood, urine and sputum cultures ordered.   Trend lactate      Heme:   Repeat labs   DVT prophylaxis with SCD only   No chemical prophylaxis at this time.       Other/Disposition:   Will remain in the ICU.   Code Status: Full code    Case discussed with ICU attending.        81 year old man with a PMHx of CKD, orthostatic hypotension, 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 On workup found to have an acute, comminuted, displaced right femoral intertrochanteric fracture with hematoma. Patient went to the OR on 9/1 and is s/p Intramedullary nailing of right femur. Post op admitted to the ICU for management of acute blood loss anemia, shock, respiratory failure, encephalopathy, elevated lactate and metabolic acidosis.      Plan:     Neuro:  On sedation, will titrate sedation to RASS of 0 to -2  Fentanyl drip for pain control       CV:   On lianet and levophed drips  Titrate to MAP of 65  Echo ordered      Pulmonary:   Continue with mechanical ventilation.   Vent settings adjusted.   ABG ordered  Suction as needed       GI:   GI prophylaxis with pantoprazole   NPO      Endo:   Monitor fingersticks q 6 hrs   Glycemic control with insulin sliding scale as needed      Renal:   Terrell in place in a critical ill pt for accurate urinary monitoring   I and O hourly   Monitor and correct electrolytes      ID:   Will continue Ancef for now.   Blood, urine and sputum cultures ordered.   Trend lactate      Heme:   Repeat labs   DVT prophylaxis with SCD only   No chemical prophylaxis at this time.       Other/Disposition:   Will remain in the ICU.   Code Status: Full code    Case discussed with ICU attending.

## 2021-09-03 NOTE — CHART NOTE - NSCHARTNOTEFT_GEN_A_CORE
Pt this afternoon became hypotensive SBP 50s on levophed 0.1mcg/kg/hr. Has been weaned off neosynephrine and vasopressin.    Levophed increased and neosynephrine resumed.    Examined and found pt with saturated surgical dressing with (dark brown) ooze saturating debbie mixed with some red blood. R thigh edematous and swollen but soft.     Hb 7.6 this afternoon down from 8.1 this morning.     Will transfuse 1 unit pRBC. WBC remains elevated at 17. Blood cx and urine cx from yesterday 9/2 negative.     Sputum cx + pseudomonas     Pt already on zosyn, will continue with zosyn. Follow up sputum cx sensitivities.

## 2021-09-03 NOTE — PROGRESS NOTE ADULT - SUBJECTIVE AND OBJECTIVE BOX
CC: Patient is a 81y old  Male who presents with a chief complaint of Right hip fracture (03 Sep 2021 06:53)      ## HPI:HPI:  82 yo M community ambulator w/PMH of CKD, orthostatic hypotension, CABG more than 12 years ago without issue, HLD, COPD, occasional memory loss/confusion presents to the ED s/p fall. Per daughter pt at baseline, AAOx2 at baseline. Daughter is translating for pt and reports pt got his foot got stuck and fell. Fall Witnessed by grandson. No head-strike or LOC. Pt on ASA 81mg. Denies AC use. Denies fever/chills, cough, CP, SOB, N/V, abdominal pain or headache. Pt unable to ambulate. Pt main complaint is right hip pain     	No fever/chills, No photophobia/eye pain/changes in vision, No ear pain/sore throat/dysphagia, No chest pain/palpitations, no SOB/cough/wheeze/stridor, No abdominal pain, No N/V/D, no dysuria/frequency/discharge, No neck/back pain, no rash, no changes in neurological status/function.  (31 Aug 2021 17:35)      O/N: decreased pressor requirements    ## ROS:  unable to assess ros due to mental status   ## EXAM  ICU Vital Signs Last 24 Hrs  T(C): 36.9 (03 Sep 2021 07:15), Max: 37.3 (03 Sep 2021 05:30)  T(F): 98.4 (03 Sep 2021 07:15), Max: 99.1 (03 Sep 2021 05:30)  HR: 59 (03 Sep 2021 12:08) (52 - 98)  BP: 91/47 (03 Sep 2021 11:30) (45/25 - 183/84)  BP(mean): 60 (03 Sep 2021 11:30) (31 - 136)  ABP: 119/87 (03 Sep 2021 10:30) (89/44 - 201/83)  ABP(mean): 102 (03 Sep 2021 10:30) (59 - 140)  RR: 20 (03 Sep 2021 12:00) (18 - 31)  SpO2: 100% (03 Sep 2021 12:08) (79% - 100%)    CON : NAD  EENT : EOMI, MMM  NECK : Full ROM  RESP : CTAB no increased WOB  CARD : rrr no m/r/g  ABD : S NT ND NABS no rebound  EXT : No edema  NEURO : sedated, ALVARADO  ## Labs:  Lab Results:  CBC  CBC Full  -  ( 03 Sep 2021 03:03 )  WBC Count : 19.57 K/uL  RBC Count : 2.78 M/uL  Hemoglobin : 8.1 g/dL  Hematocrit : 22.3 %  Platelet Count - Automated : 94 K/uL  Mean Cell Volume : 80.2 fl  Mean Cell Hemoglobin : 29.1 pg  Mean Cell Hemoglobin Concentration : 36.3 gm/dL  Auto Neutrophil # : 17.07 K/uL  Auto Lymphocyte # : 0.60 K/uL  Auto Monocyte # : 1.59 K/uL  Auto Eosinophil # : 0.00 K/uL  Auto Basophil # : 0.02 K/uL  Auto Neutrophil % : 87.2 %  Auto Lymphocyte % : 3.1 %  Auto Monocyte % : 8.1 %  Auto Eosinophil % : 0.0 %  Auto Basophil % : 0.1 %    .		Differential:	[] Automated		[] Manual  Chemistry                        8.1    19.57 )-----------( 94       ( 03 Sep 2021 03:03 )             22.3     09-03    140  |  107  |  57<H>  ----------------------------<  107<H>  3.9   |  19<L>  |  3.28<H>    Ca    7.8<L>      03 Sep 2021 03:03  Phos  4.6     09-  Mg     2.4     -03    TPro  4.6<L>  /  Alb  2.0<L>  /  TBili  1.3<H>  /  DBili  x   /  AST  1028<H>  /  ALT  335<H>  /  AlkPhos  44  09-03    LIVER FUNCTIONS - ( 03 Sep 2021 03:03 )  Alb: 2.0 g/dL / Pro: 4.6 gm/dL / ALK PHOS: 44 U/L / ALT: 335 U/L / AST: 1028 U/L / GGT: x           PT/INR - ( 03 Sep 2021 03:03 )   PT: 17.1 sec;   INR: 1.50 ratio         PTT - ( 03 Sep 2021 03:03 )  PTT:31.0 sec  Urinalysis Basic - ( 02 Sep 2021 13:14 )    Color: Yellow / Appearance: Clear / S.020 / pH: x  Gluc: x / Ketone: Negative  / Bili: Negative / Urobili: Negative mg/dL   Blood: x / Protein: 100 mg/dL / Nitrite: Negative   Leuk Esterase: Moderate / RBC: 11-25 /HPF / WBC 11-25   Sq Epi: x / Non Sq Epi: Occasional / Bacteria: Moderate        ABG - ( 03 Sep 2021 11:48 )  pH, Arterial: x     pH, Blood: 7.48  /  pCO2: 26    /  pO2: 49    / HCO3: 19    / Base Excess: -3.4  /  SaO2: 81          Troponin I, Serum: .509 ng/mL *H* (21 @ 03:03)  Troponin I, Serum: .504 ng/mL *H* (21 @ 03:03)  Lactate, Blood: 4.5 mmol/L *HH* (21 @ 03:02)  Lactate, Blood: 5.7 mmol/L *HH* (21 @ 19:27)      Lactate, Blood: 4.5 mmol/L (21 @ 03:02)  Lactate, Blood: 5.7 mmol/L (21 @ 19:27)      MICROBIOLOGY/CULTURES:  Culture Results:   No growth ( @ 08:58)  Culture Results:   No growth to date. ( @ 08:51)  Culture Results:   No growth to date. ( @ 08:51)          ## Medications:  MEDICATIONS  (STANDING):  chlorhexidine 0.12% Liquid 15 milliLiter(s) Oral Mucosa every 12 hours  chlorhexidine 2% Cloths 1 Application(s) Topical <User Schedule>  chlorhexidine 4% Liquid 1 Application(s) Topical <User Schedule>  dexMEDEtomidine Infusion 0.4 MICROgram(s)/kG/Hr (6.47 mL/Hr) IV Continuous <Continuous>  fentaNYL   Infusion. 0.5 MICROgram(s)/kG/Hr (3.24 mL/Hr) IV Continuous <Continuous>  hydrocortisone sodium succinate Injectable 100 milliGRAM(s) IV Push every 8 hours  ketamine Infusion. 0.25 mG/kG/Hr (1.62 mL/Hr) IV Continuous <Continuous>  midazolam Infusion 0.02 mG/kG/Hr (1.29 mL/Hr) IV Continuous <Continuous>  midodrine. 15 milliGRAM(s) Oral three times a day  norepinephrine Infusion 0.05 MICROgram(s)/kG/Min (3.03 mL/Hr) IV Continuous <Continuous>  pantoprazole  Injectable 40 milliGRAM(s) IV Push daily  phenylephrine    Infusion 4 MICROgram(s)/kG/Min (48.5 mL/Hr) IV Continuous <Continuous>  piperacillin/tazobactam IVPB.. 3.375 Gram(s) IV Intermittent every 12 hours  vasopressin Infusion 0.02 Unit(s)/Min (1.2 mL/Hr) IV Continuous <Continuous>    ## O/E:I&O's Summary    02 Sep 2021 07:01  -  03 Sep 2021 07:00  --------------------------------------------------------  IN: 2530.6 mL / OUT: 470 mL / NET: 2060.6 mL        POCUS :   DVT PPX  ## Code status:  Goals of care discussion: [] yes [ ] no  [x] full code  [ ] DNR  [ ] DNI  [ ] CELIA

## 2021-09-03 NOTE — PROGRESS NOTE ADULT - SUBJECTIVE AND OBJECTIVE BOX
[facility] NEPHROLOGY SERVICES, [facility]  NEPHROLOGY AND HYPERTENSION  [address]  [address]  [place] [address]  [phone]    MD ELSA SANTA MD ANDREY GONCHARUK, MD MADHU KORRAPATI, MD YELENA ROSENBERG, MD BINNY KOSHY, MD CHRISTOPHER CAPUTO, MD EDWARD BOVER, MD          Patient events noted  No new events    MEDICATIONS  (STANDING):  chlorhexidine 0.12% Liquid 15 milliLiter(s) Oral Mucosa every 12 hours  chlorhexidine 2% Cloths 1 Application(s) Topical <User Schedule>  chlorhexidine 4% Liquid 1 Application(s) Topical <User Schedule>  dexMEDEtomidine Infusion 0.4 MICROgram(s)/kG/Hr (6.47 mL/Hr) IV Continuous <Continuous>  fentaNYL   Infusion. 0.5 MICROgram(s)/kG/Hr (3.24 mL/Hr) IV Continuous <Continuous>  hydrocortisone sodium succinate Injectable 100 milliGRAM(s) IV Push every 8 hours  ketamine Infusion. 0.25 mG/kG/Hr (1.62 mL/Hr) IV Continuous <Continuous>  midazolam Infusion 0.02 mG/kG/Hr (1.29 mL/Hr) IV Continuous <Continuous>  midodrine. 15 milliGRAM(s) Oral three times a day  norepinephrine Infusion 0.05 MICROgram(s)/kG/Min (3.03 mL/Hr) IV Continuous <Continuous>  pantoprazole  Injectable 40 milliGRAM(s) IV Push daily  phenylephrine    Infusion 4 MICROgram(s)/kG/Min (48.5 mL/Hr) IV Continuous <Continuous>  piperacillin/tazobactam IVPB.. 3.375 Gram(s) IV Intermittent every 12 hours  vasopressin Infusion 0.02 Unit(s)/Min (1.2 mL/Hr) IV Continuous <Continuous>    MEDICATIONS  (PRN):  sodium chloride 0.9% lock flush 10 milliLiter(s) IV Push every 1 hour PRN Pre/post blood products, medications, blood draw, and to maintain line patency      09-02-21 @ 07:01  -  09-03-21 @ 07:00  --------------------------------------------------------  IN: 2530.6 mL / OUT: 470 mL / NET: 2060.6 mL    09-03-21 @ 07:01  -  09-03-21 @ 22:12  --------------------------------------------------------  IN: 966 mL / OUT: 825 mL / NET: 141 mL      PHYSICAL EXAM:      T(C): 35.6 (09-03-21 @ 19:13), Max: 37.3 (09-03-21 @ 05:30)  HR: 63 (09-03-21 @ 21:30) (49 - 86)  BP: 147/77 (09-03-21 @ 21:30) (52/40 - 173/98)  RR: 16 (09-03-21 @ 21:30) (8 - 31)  SpO2: 100% (09-03-21 @ 21:30) (89% - 100%)  Wt(kg): --  Lungs clear  Heart S1S2  Abd soft NT ND  Extremities:   tr edema                                    9.6    21.04 )-----------( 88       ( 03 Sep 2021 20:11 )             27.7     09-03    140  |  109<H>  |  61<H>  ----------------------------<  111<H>  3.7   |  23  |  3.17<H>    Ca    7.5<L>      03 Sep 2021 13:01  Phos  4.8     09-03  Mg     2.5     09-03    TPro  4.4<L>  /  Alb  1.9<L>  /  TBili  1.1  /  DBili  x   /  AST  779<H>  /  ALT  229<H>  /  AlkPhos  46  09-03    ABG - ( 03 Sep 2021 11:48 )  pH, Arterial: x     pH, Blood: 7.48  /  pCO2: 26    /  pO2: 49    / HCO3: 19    / Base Excess: -3.4  /  SaO2: 81                LIVER FUNCTIONS - ( 03 Sep 2021 13:01 )  Alb: 1.9 g/dL / Pro: 4.4 gm/dL / ALK PHOS: 46 U/L / ALT: 229 U/L / AST: 779 U/L / GGT: x           Creatinine Trend: 3.17<--, 3.28<--, 3.32<--, 3.23<--, 1.68<--, 2.99<--  Mode: AC/ CMV (Assist Control/ Continuous Mandatory Ventilation)  RR (machine): 16  TV (machine): 400  FiO2: 40  PEEP: 5  ITime: 1  MAP: 8  PIP: 21          ASSESSMENT:  1.  LAUREN due to ischemic ATN  2.  CKD stage 4 with baseline Cr around 2.8  3.  Lactic acidosis due to shock, improving    PLAN:  Supportive care / hemodynamic support  Monitor daily BMP and fluid balance    Jimbo Hutchinson MD

## 2021-09-03 NOTE — PROGRESS NOTE ADULT - ASSESSMENT
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 Per daughter pt at baseline, AAOx2 at baseline. Daughter is translating for pt and reports pt got his foot got stuck and fell. Fall Witnessed by grandson. No head-strike or LOC. Pt on ASA 81mg. Denies AC use. Denies fever/chills, cough, CP, SOB, N/V, abdominal pain or headache. Pt unable to ambulate. Pt main complaint is right hip pain     Intubated and sedated    R hip fracture, POD 1  s/p R IMN  Management per ortho    Shock, septic v hypovolemic  elevated lactate and procal  cxr reviewed  On midodrine, steroids and pressors  empiric antiobitcs  continue to monitor  blood and urine cultures pending    Acute blood loss anemia  likely from bleeding from fracture, right thigh hematoma  CT pelvis and RLE pending   monitor cbc  transfuse to keep >7    Transaminitis  likly due to shock  trend LFTs    Tachycardia  Likely due to sepsis/shock    LAUREN, CKD4  likely due to rhabdo v shock  IV hydration  continue to monitor    Hyponatremia  resolved    COPD    HLD  on statin     DVT ppx holding

## 2021-09-03 NOTE — CHART NOTE - NSCHARTNOTEFT_GEN_A_CORE
Called to evaluate patient due to saturation of dressings. Patient seen and examined. New dressings with gauze and ABDs were placed by CCU due to saturation, CDI. Incision sites evaluated, staples in place, no active oozing, no erythema or evidence of infection. New Aquacel dressings applied. Thigh is swollen but compartments soft and compressible. DP pulse palpable. Patient remains intubated/sedated on pressors x2. Will continue to monitor.

## 2021-09-03 NOTE — PROGRESS NOTE ADULT - ASSESSMENT
81 year old man with a PMHx of CKD, PCKD, orthostatic hypotension on midodrine, CABG, COPD, p/w fall and right femoral intertrochanteric fracture with hematoma. Pt s/p OR w/ IM nail on 9/1 and tx to ICU in shock, respiratory failure, LAUREN. shock improving    POCUS: no right heart strain.  grossly normal LV function. no IVC dilation    Neuro  cont ketamine/fent for analgesia/sedation  on prn versed  titrate as per sedation scale    Pulm  cont vent support  titrate fio2/peep for sat>90%    CVS  shock improving.  stopping pressors  had some hemorrhagic component w/ good BP response - cbc maintained  likely has component from sedation and from his baseline autonomic instability (on midodrine at home for OH)  maintain map>65  cont stress dose steroids  trop mildly inc from demand ischemia  ekg w/o ischemia    Renal  ckd w/ like LAUREN now  monitor UO and electrolytes      Heme  s/p total 5 prbc  tx 1 plt given plt rapid drop from consumption  when stable for trnsport will send CT A/p and thigh    Ortho  cont f/u ortho reccs regarding postop care  DVT ppx w/ SCD given active bleeding    GOC: Informed daughter at bedside on patient's condition. no change in goal of care

## 2021-09-03 NOTE — PROGRESS NOTE ADULT - SUBJECTIVE AND OBJECTIVE BOX
Orthopedics      Patient seen and examined at bedside. Remains intubated and sedated. Varying pressor requirements overnight with stable blood pressures. Stable H/H on serial blood draws throughout the day yesterday. No acute events overnight.    Vital Signs Last 24 Hrs  T(C): 36.7 (09-02-21 @ 23:30), Max: 37.5 (09-02-21 @ 10:00)  T(F): 98.1 (09-02-21 @ 23:30), Max: 99.5 (09-02-21 @ 10:00)  HR: 70 (09-03-21 @ 05:23) (70 - 131)  BP: 113/58 (09-02-21 @ 23:00) (40/25 - 189/93)  BP(mean): 66 (09-02-21 @ 23:00) (31 - 136)  RR: 30 (09-03-21 @ 05:00) (18 - 31)  SpO2: 99% (09-03-21 @ 05:23) (79% - 100%)                        8.1    19.57 )-----------( 94       ( 03 Sep 2021 03:03 )             22.3     03 Sep 2021 03:03    140    |  107    |  57     ----------------------------<  107    3.9     |  19     |  3.28     Ca    7.8        03 Sep 2021 03:03  Phos  4.6       03 Sep 2021 03:03  Mg     2.4       03 Sep 2021 03:03    TPro  4.6    /  Alb  2.0    /  TBili  1.3    /  DBili  x      /  AST  1028   /  ALT  335    /  AlkPhos  44     03 Sep 2021 03:03    PT/INR - ( 03 Sep 2021 03:03 )   PT: 17.1 sec;   INR: 1.50 ratio         PTT - ( 03 Sep 2021 03:03 )  PTT:31.0 sec    Exam:  Gen: In CCU, requiring pressor support, intubated, unable to participate in exam  Dressings reinforced with ABD's and paper tape   Unable to evaluate sensation and motor secondary to sedation  Does not withdraw to pain RLE  +DP/PT 2+  Unable to evaluate calf TTP.  Thigh is swollen but compartments soft and compressible    A/P:  81yMale Stable POD 2  s/p R IMN    -CCU and medical management appreciated  -Total 5units PRBC/1Plt this admission, continue to trend H/H, Goal Hgb>8.0, recommend 1:1 resuscitation for traumatic anemia   -Patient on pressor support per CCU team, wean as tolerated  -CT RLE ordered by CCU to evaluate for hematoma/active bleed, may perform when stable  -WBAT; PT/OT once medically stable  -Pain control PRN  -DVT ppx held due to severe hypotension requiring pressor support  -Incentive spirometry  -Home medications continued  -Dispo pending PT evaluation  -Will discuss with attending Dr. Kyle and advise if any changes to plan

## 2021-09-04 NOTE — PROGRESS NOTE ADULT - SUBJECTIVE AND OBJECTIVE BOX
[facility] NEPHROLOGY SERVICES, [facility]  NEPHROLOGY AND HYPERTENSION  [address]  [address]  [place] [address]  [phone]    MD ELSA SANTA MD ANDREY GONCHARUK, MD MADHU KORRAPATI, MD YELENA ROSENBERG, MD BINNY KOSHY, MD CHRISTOPHER CAPUTO, MD SUSY LEIJA MD          Patient events noted    MEDICATIONS  (STANDING):  chlorhexidine 0.12% Liquid 15 milliLiter(s) Oral Mucosa every 12 hours  chlorhexidine 2% Cloths 1 Application(s) Topical <User Schedule>  fentaNYL   Infusion. 0.5 MICROgram(s)/kG/Hr (3.24 mL/Hr) IV Continuous <Continuous>  hydrocortisone sodium succinate Injectable 100 milliGRAM(s) IV Push every 8 hours  ketamine Infusion. 0.25 mG/kG/Hr (1.62 mL/Hr) IV Continuous <Continuous>  midazolam Injectable 2 milliGRAM(s) IV Push once  midodrine. 15 milliGRAM(s) Oral three times a day  pantoprazole  Injectable 40 milliGRAM(s) IV Push daily  piperacillin/tazobactam IVPB.. 3.375 Gram(s) IV Intermittent every 12 hours  polyethylene glycol 3350 17 Gram(s) Oral daily  senna Syrup 5 milliLiter(s) Oral daily    MEDICATIONS  (PRN):  sodium chloride 0.9% lock flush 10 milliLiter(s) IV Push every 1 hour PRN Pre/post blood products, medications, blood draw, and to maintain line patency      09-03-21 @ 07:01 - 09-04-21 @ 07:00  --------------------------------------------------------  IN: 1477.8 mL / OUT: 1325 mL / NET: 152.8 mL    09-04-21 @ 07:01 - 09-04-21 @ 20:48  --------------------------------------------------------  IN: 74.8 mL / OUT: 610 mL / NET: -535.2 mL      PHYSICAL EXAM:      T(C): 36.4 (09-04-21 @ 19:05), Max: 37.8 (09-04-21 @ 08:00)  HR: 68 (09-04-21 @ 20:00) (49 - 77)  BP: 186/84 (09-04-21 @ 20:00) (84/50 - 186/84)  RR: 14 (09-04-21 @ 20:00) (14 - 24)  SpO2: 94% (09-04-21 @ 20:00) (76% - 100%)  Wt(kg): --  Lungs clear  Heart S1S2  Abd soft NT ND  Extremities:   tr edema                                    8.9    16.16 )-----------( 81       ( 04 Sep 2021 04:26 )             25.5     09-04    141  |  109<H>  |  66<H>  ----------------------------<  119<H>  3.9   |  24  |  3.29<H>    Ca    7.5<L>      04 Sep 2021 04:26  Phos  6.2     09-04  Mg     2.7     09-04    TPro  4.7<L>  /  Alb  2.1<L>  /  TBili  0.9  /  DBili  x   /  AST  512<H>  /  ALT  134<H>  /  AlkPhos  80  09-04    ABG - ( 03 Sep 2021 23:37 )  pH, Arterial: x     pH, Blood: 7.32  /  pCO2: 40    /  pO2: 121   / HCO3: 20    / Base Excess: -4.9  /  SaO2: 99                LIVER FUNCTIONS - ( 04 Sep 2021 04:26 )  Alb: 2.1 g/dL / Pro: 4.7 gm/dL / ALK PHOS: 80 U/L / ALT: 134 U/L / AST: 512 U/L / GGT: x           Creatinine Trend: 3.29<--, 3.17<--, 3.28<--, 3.32<--, 3.23<--, 1.68<--  Mode: AC/ CMV (Assist Control/ Continuous Mandatory Ventilation)  RR (machine): 16  TV (machine): 400  FiO2: 30  PEEP: 5  ITime: 1  MAP: 9  PIP: 26      Assessment   LAUREN ischemic ATN;   CKD 4      Plan:  Supportive care;   Maintain MAP >60   Will follow    Jimbo Hutchinson MD

## 2021-09-04 NOTE — PROGRESS NOTE ADULT - ASSESSMENT
A/P:  81yMale Stable POD3 s/p R IMN    -CCU and medical management appreciated  -Total 6units PRBC/1Plt this admission, continue to trend H/H, Goal Hgb>8.0, recommend 1:1 resuscitation for traumatic anemia   -Patient weaned from pressor support per CCU team  -CT RLE ordered by CCU to evaluate for hematoma/active bleed, may perform when stable  -Swelling of b/l UE noted. Would recommend elevating b/l UE  -Per CCU plan for possible SBT today   -WBAT; PT/OT once medically stable  -Pain control PRN  -DVT ppx held due to severe hypotension requiring pressor support  -Incentive spirometry  -Home medications continued  -Dispo pending PT evaluation  -Will discuss with attending Dr. Kyle and advise if any changes to plan A/P:  81yMale Stable POD3 s/p R IMN    -CCU and medical management appreciated  -Total 6units PRBC/1Plt this admission, continue to trend H/H, Goal Hgb>8.0, recommend 1:1 resuscitation for traumatic anemia   -Patient weaned from pressor support per CCU team  -CT RLE ordered by CCU to evaluate for hematoma/active bleed, may perform when stable  -Swelling of b/l UE noted. Would recommend elevating b/l UE  -Per CCU plan for possible SBT today   - Blood and urine cx NGTD. Sputum culture +pseudomonas, on zosyn   -WBAT; PT/OT once medically stable  -Pain control PRN  -DVT ppx held due to severe hypotension requiring pressor support  -Incentive spirometry  -Home medications continued  -Dispo pending PT evaluation  -Will discuss with attending Dr. Kyle and advise if any changes to plan A/P:  81yMale Stable POD3 s/p R IMN    -CCU and medical management appreciated  -Total 6units PRBC/1Plt this admission, continue to trend H/H, Goal Hgb>8.0, recommend 1:1 resuscitation for traumatic anemia   -Patient weaned from pressor support per CCU team  -CT RLE obtained yesterday and reviewed  -Swelling of b/l UE noted. Would recommend elevating b/l UE  -Per CCU plan for possible SBT today   - Blood and urine cx NGTD. Sputum culture +pseudomonas, on zosyn   -WBAT; PT/OT once medically stable  -Pain control PRN  -DVT ppx held due to severe hypotension requiring pressor support  -Incentive spirometry  -Home medications continued  -Dispo pending PT evaluation  -Will discuss with attending Dr. Kyle and advise if any changes to plan A/P:  81yMale Stable POD3 s/p R IMN    -CCU and medical management appreciated  -Total 6units PRBC/1Plt this admission, continue to trend H/H, Goal Hgb>8.0, recommend 1:1 resuscitation for traumatic anemia   -Patient weaned from pressor support per CCU team  -CT RLE obtained yesterday and reviewed  -Swelling of b/l UE noted. Would recommend elevating b/l UE  -Per CCU plan for possible SBT today   -Blood and urine cx NGTD. Sputum culture +pseudomonas, on zosyn   -WBAT; PT/OT once medically stable  -Pain control PRN  -DVT ppx held due to severe hypotension requiring pressor support, to be restarted when appropriate via medicine/CCU teams  -Dispo pending PT evaluation, unable to work with patient due to current sedation/status  -Discussed with attending Dr. Kyle who agrees with plan

## 2021-09-04 NOTE — PROGRESS NOTE ADULT - ASSESSMENT
81 year old man with a PMHx of CKD, PCKD, orthostatic hypotension on midodrine, CABG, COPD, p/w fall and right femoral intertrochanteric fracture with hematoma. Pt s/p OR w/ IM nail on 9/1 and tx to ICU in shock, respiratory failure, LAUREN. shock improving        Neuro  cont ketamine attempt to wean for extubation  titrate as per sedation scale    Pulm  cont vent support  titrate fio2/peep for sat>90%  desatted during sbt    CVS  shock improving.  stopping pressors  had some hemorrhagic component w/ good BP response - cbc maintained  likely has component from sedation and from his baseline autonomic instability (on midodrine at home for OH)  maintain map>65  cont stress dose steroids  trop mildly inc from demand ischemia  ekg w/o ischemia    Renal  ckd w/ like LAUREN now  monitor UO and electrolytes      Heme  s/p total 5 prbc  tx 1 plt given plt rapid drop from consumption  ct without obvious hematoma    Ortho  cont f/u ortho reccs regarding postop care  DVT ppx w/ SCD given active bleeding

## 2021-09-04 NOTE — PROGRESS NOTE ADULT - SUBJECTIVE AND OBJECTIVE BOX
Orthopedics      Patient seen and examined at bedside. Remains intubated and sedated. Off of pressors since 00:00 with systolics in the low 100's. Transfused 1U PRBC on 9/3 with adequate response. H/H 8.9/25.5 this morning. No acute events overnight.    Vital Signs Last 24 Hrs  T(C): 35.6 (04 Sep 2021 04:55), Max: 36.9 (03 Sep 2021 07:15)  T(F): 96.1 (04 Sep 2021 04:55), Max: 98.4 (03 Sep 2021 07:15)  HR: 64 (04 Sep 2021 06:45) (49 - 70)  BP: 102/54 (04 Sep 2021 06:30) (52/40 - 173/98)  BP(mean): 69 (04 Sep 2021 06:30) (46 - 116)  RR: 16 (04 Sep 2021 06:45) (8 - 30)  SpO2: 97% (04 Sep 2021 06:45) (89% - 100%)    Labs:    POCT Blood Glucose.: 114 mg/dL (04 Sep 2021 05:20)  POCT Blood Glucose.: 138 mg/dL (04 Sep 2021 01:54)  POCT Blood Glucose.: 124 mg/dL (03 Sep 2021 12:18)                          8.9    16.16 )-----------( 81       ( 04 Sep 2021 04:26 )             25.5             141  |  109<H>  |  66<H>  ----------------------------<  119<H>  3.9   |  24  |  3.29<H>      Calcium, Total Serum: 7.5 mg/dL (21 @ 04:26)      LFTs:             4.7  | 0.9  | 512      ------------------[80      ( 04 Sep 2021 04:26 )  2.1  | x    | 134            Lactate, Blood: 1.4 mmol/L (21 @ 13:01)  Lactate, Blood: 4.5 mmol/L (21 @ 03:02)  Lactate, Blood: 5.7 mmol/L (21 @ 19:27)  Lactate, Blood: 5.0 mmol/L (21 @ 12:18)  Lactate, Blood: 9.3 mmol/L (21 @ 09:31)  Lactate, Blood: 7.6 mmol/L (21 @ 04:50)  Lactate, Blood: 5.2 mmol/L (21 @ 00:05)    ABG - ( 03 Sep 2021 23:37 )  pH: x     /  pCO2: 40    /  pO2: 121   / HCO3: 20    / Base Excess: -4.9  /  SaO2: 99          ABG - ( 03 Sep 2021 11:48 )  pH: x     /  pCO2: 26    /  pO2: 49    / HCO3: 19    / Base Excess: -3.4  /  SaO2: 81          ABG - ( 03 Sep 2021 10:04 )  pH: x     /  pCO2: 23    /  pO2: 84    / HCO3: 19    / Base Excess: -2.7  /  SaO2: 97          Coags:     17.1   ----< 1.50    ( 03 Sep 2021 03:03 )     31.0        CARDIAC MARKERS ( 03 Sep 2021 03:03 )  .509 ng/mL / x     / 2756 U/L / x     / 16.5 ng/mL  CARDIAC MARKERS ( 02 Sep 2021 12:18 )  .285 ng/mL / x     / 1030 U/L / x     / 12.0 ng/mL      Urinalysis Basic - ( 02 Sep 2021 13:14 )    Color: Yellow / Appearance: Clear / S.020 / pH: x  Gluc: x / Ketone: Negative  / Bili: Negative / Urobili: Negative mg/dL   Blood: x / Protein: 100 mg/dL / Nitrite: Negative   Leuk Esterase: Moderate / RBC: 11-25 /HPF / WBC 11-25   Sq Epi: x / Non Sq Epi: Occasional / Bacteria: Moderate        Culture - Sputum (collected 03 Sep 2021 00:38)  Source: .Sputum Sputum  Gram Stain (prelim) (03 Sep 2021 18:09):    Numerous polymorphonuclear leukocytes per low power field    Rare Squamous epithelial cells per low power field    Moderate Gram Variable Rods seen per oil power field    Few Gram positive cocci in pairs seen per oil power field  Preliminary Report (03 Sep 2021 18:09):    Few Pseudomonas aeruginosa    Culture - Urine (collected 02 Sep 2021 08:58)  Source: Catheterized Catheterized  Final Report (03 Sep 2021 05:09):    No growth    Culture - Blood (collected 02 Sep 2021 08:51)  Source: .Blood Blood  Preliminary Report (03 Sep 2021 09:01):    No growth to date.    Culture - Blood (collected 02 Sep 2021 08:51)  Source: .Blood Blood  Preliminary Report (03 Sep 2021 09:01):    No growth to date.      Exam:  Gen: In CCU, off pressors, intubated, unable to participate in exam  Dressings reinforced with ABD's and paper tape   Unable to evaluate sensation and motor secondary to sedation  Does not withdraw to pain RLE  +DP/PT 2+  Unable to evaluate calf TTP.  Thigh is swollen but compartments soft and compressible  Bilateral UE swelling and ecchymosis noted       Orthopedics    Patient seen and examined at bedside. Remains intubated and sedated. Off of pressors since 00:00 with systolics in the low 100's. Transfused 1U PRBC on 9/3 with adequate response. H/H 8.9/25.5 this morning. No acute events overnight. Plan for Spontaneous Breathing Trial today if remains stable.     Vital Signs Last 24 Hrs  T(C): 35.6 (04 Sep 2021 04:55), Max: 36.9 (03 Sep 2021 07:15)  T(F): 96.1 (04 Sep 2021 04:55), Max: 98.4 (03 Sep 2021 07:15)  HR: 64 (04 Sep 2021 06:45) (49 - 70)  BP: 102/54 (04 Sep 2021 06:30) (52/40 - 173/98)  BP(mean): 69 (04 Sep 2021 06:30) (46 - 116)  RR: 16 (04 Sep 2021 06:45) (8 - 30)  SpO2: 97% (04 Sep 2021 06:45) (89% - 100%)    Labs:    POCT Blood Glucose.: 114 mg/dL (04 Sep 2021 05:20)  POCT Blood Glucose.: 138 mg/dL (04 Sep 2021 01:54)  POCT Blood Glucose.: 124 mg/dL (03 Sep 2021 12:18)                          8.9    16.16 )-----------( 81       ( 04 Sep 2021 04:26 )             25.5             141  |  109<H>  |  66<H>  ----------------------------<  119<H>  3.9   |  24  |  3.29<H>      Calcium, Total Serum: 7.5 mg/dL (21 @ 04:26)      LFTs:             4.7  | 0.9  | 512      ------------------[80      ( 04 Sep 2021 04:26 )  2.1  | x    | 134            Lactate, Blood: 1.4 mmol/L (21 @ 13:01)  Lactate, Blood: 4.5 mmol/L (21 @ 03:02)  Lactate, Blood: 5.7 mmol/L (21 @ 19:27)  Lactate, Blood: 5.0 mmol/L (21 @ 12:18)  Lactate, Blood: 9.3 mmol/L (21 @ 09:31)  Lactate, Blood: 7.6 mmol/L (21 @ 04:50)  Lactate, Blood: 5.2 mmol/L (21 @ 00:05)    ABG - ( 03 Sep 2021 23:37 )  pH: x     /  pCO2: 40    /  pO2: 121   / HCO3: 20    / Base Excess: -4.9  /  SaO2: 99          ABG - ( 03 Sep 2021 11:48 )  pH: x     /  pCO2: 26    /  pO2: 49    / HCO3: 19    / Base Excess: -3.4  /  SaO2: 81          ABG - ( 03 Sep 2021 10:04 )  pH: x     /  pCO2: 23    /  pO2: 84    / HCO3: 19    / Base Excess: -2.7  /  SaO2: 97          Coags:     17.1   ----< 1.50    ( 03 Sep 2021 03:03 )     31.0        CARDIAC MARKERS ( 03 Sep 2021 03:03 )  .509 ng/mL / x     / 2756 U/L / x     / 16.5 ng/mL  CARDIAC MARKERS ( 02 Sep 2021 12:18 )  .285 ng/mL / x     / 1030 U/L / x     / 12.0 ng/mL      Urinalysis Basic - ( 02 Sep 2021 13:14 )    Color: Yellow / Appearance: Clear / S.020 / pH: x  Gluc: x / Ketone: Negative  / Bili: Negative / Urobili: Negative mg/dL   Blood: x / Protein: 100 mg/dL / Nitrite: Negative   Leuk Esterase: Moderate / RBC: 11-25 /HPF / WBC 11-25   Sq Epi: x / Non Sq Epi: Occasional / Bacteria: Moderate        Culture - Sputum (collected 03 Sep 2021 00:38)  Source: .Sputum Sputum  Gram Stain (prelim) (03 Sep 2021 18:09):    Numerous polymorphonuclear leukocytes per low power field    Rare Squamous epithelial cells per low power field    Moderate Gram Variable Rods seen per oil power field    Few Gram positive cocci in pairs seen per oil power field  Preliminary Report (03 Sep 2021 18:09):    Few Pseudomonas aeruginosa    Culture - Urine (collected 02 Sep 2021 08:58)  Source: Catheterized Catheterized  Final Report (03 Sep 2021 05:09):    No growth    Culture - Blood (collected 02 Sep 2021 08:51)  Source: .Blood Blood  Preliminary Report (03 Sep 2021 09:01):    No growth to date.    Culture - Blood (collected 02 Sep 2021 08:51)  Source: .Blood Blood  Preliminary Report (03 Sep 2021 09:01):    No growth to date.      Exam:  Gen: In CCU, off pressors, intubated, unable to participate in exam  RLE:   Aquacel Dressings reinforced with ABD's and paper tape   Unable to evaluate sensation and motor secondary to sedation  Does not withdraw to pain RLE  +DP/PT 2+  Unable to evaluate calf TTP.  Thigh is swollen but compartments soft and compressible

## 2021-09-04 NOTE — PROGRESS NOTE ADULT - SUBJECTIVE AND OBJECTIVE BOX
CC: Patient is a 81y old  Male who presents with a chief complaint of Right hip fracture (04 Sep 2021 07:00)      ## HPI:HPI:  80 yo M community ambulator w/PMH of CKD, orthostatic hypotension, CABG more than 12 years ago without issue, HLD, COPD, occasional memory loss/confusion presents to the ED s/p fall. Per daughter pt at baseline, AAOx2 at baseline. Daughter is translating for pt and reports pt got his foot got stuck and fell. Fall Witnessed by grandson. No head-strike or LOC. Pt on ASA 81mg. Denies AC use. Denies fever/chills, cough, CP, SOB, N/V, abdominal pain or headache. Pt unable to ambulate. Pt main complaint is right hip pain     	No fever/chills, No photophobia/eye pain/changes in vision, No ear pain/sore throat/dysphagia, No chest pain/palpitations, no SOB/cough/wheeze/stridor, No abdominal pain, No N/V/D, no dysuria/frequency/discharge, No neck/back pain, no rash, no changes in neurological status/function.  (31 Aug 2021 17:35)      O/N: no acute events    ## ROS:  unable to assess ros due to mental status   ## EXAM  ICU Vital Signs Last 24 Hrs  T(C): 37.8 (04 Sep 2021 08:00), Max: 37.8 (04 Sep 2021 08:00)  T(F): 100.1 (04 Sep 2021 08:00), Max: 100.1 (04 Sep 2021 08:00)  HR: 67 (04 Sep 2021 10:50) (49 - 77)  BP: 94/65 (04 Sep 2021 10:00) (52/40 - 173/98)  BP(mean): 73 (04 Sep 2021 10:00) (46 - 116)  ABP: --  ABP(mean): --  RR: 16 (04 Sep 2021 10:50) (8 - 24)  SpO2: 97% (04 Sep 2021 10:50) (89% - 100%)    CON : NAD  EENT : EOMI, MMM  NECK : Full ROM  RESP : CTAB no increased WOB  CARD : rrr no m/r/g  ABD : S NT ND NABS no rebound  EXT : b/l edema  NEURO : sedated  ## Labs:  Lab Results:  CBC  CBC Full  -  ( 04 Sep 2021 04:26 )  WBC Count : 16.16 K/uL  RBC Count : 3.02 M/uL  Hemoglobin : 8.9 g/dL  Hematocrit : 25.5 %  Platelet Count - Automated : 81 K/uL  Mean Cell Volume : 84.4 fl  Mean Cell Hemoglobin : 29.5 pg  Mean Cell Hemoglobin Concentration : 34.9 gm/dL  Auto Neutrophil # : x  Auto Lymphocyte # : x  Auto Monocyte # : x  Auto Eosinophil # : x  Auto Basophil # : x  Auto Neutrophil % : x  Auto Lymphocyte % : x  Auto Monocyte % : x  Auto Eosinophil % : x  Auto Basophil % : x    .		Differential:	[] Automated		[] Manual  Chemistry                        8.9    16.16 )-----------( 81       ( 04 Sep 2021 04:26 )             25.5     09-04    141  |  109<H>  |  66<H>  ----------------------------<  119<H>  3.9   |  24  |  3.29<H>    Ca    7.5<L>      04 Sep 2021 04:26  Phos  6.2     09-04  Mg     2.7     09-04    TPro  4.7<L>  /  Alb  2.1<L>  /  TBili  0.9  /  DBili  x   /  AST  512<H>  /  ALT  134<H>  /  AlkPhos  80  09-04    LIVER FUNCTIONS - ( 04 Sep 2021 04:26 )  Alb: 2.1 g/dL / Pro: 4.7 gm/dL / ALK PHOS: 80 U/L / ALT: 134 U/L / AST: 512 U/L / GGT: x           PT/INR - ( 03 Sep 2021 03:03 )   PT: 17.1 sec;   INR: 1.50 ratio         PTT - ( 03 Sep 2021 03:03 )  PTT:31.0 sec  Urinalysis Basic - ( 02 Sep 2021 13:14 )    Color: Yellow / Appearance: Clear / S.020 / pH: x  Gluc: x / Ketone: Negative  / Bili: Negative / Urobili: Negative mg/dL   Blood: x / Protein: 100 mg/dL / Nitrite: Negative   Leuk Esterase: Moderate / RBC: 11-25 /HPF / WBC 11-25   Sq Epi: x / Non Sq Epi: Occasional / Bacteria: Moderate        ABG - ( 03 Sep 2021 23:37 )  pH, Arterial: x     pH, Blood: 7.32  /  pCO2: 40    /  pO2: 121   / HCO3: 20    / Base Excess: -4.9  /  SaO2: 99                Lactate, Blood: 1.4 mmol/L (21 @ 13:01)      Lactate, Blood: 1.4 mmol/L (21 @ 13:01)      MICROBIOLOGY/CULTURES:  Culture Results:   Few Pseudomonas aeruginosa ( @ 00:38)  Culture Results:   No growth ( @ 08:58)  Culture Results:   No growth to date. ( @ 08:51)  Culture Results:   No growth to date. ( @ 08:51)      RADIOLOGY RESULTS:  < from: CT Lower Extremity No Cont, Right (21 @ 20:41) >  IMPRESSION:    Status post open reduction internal fixation of a right hip intertrochanteric fracture. Diffuse intramuscular and myofascial edema about the right proximal femur adjacent to the site of fracture and within the right adductor muscles. No well-defined hematoma is demonstrated.    The avulsed right lesser trochanter is displaced anteriorly and appears to abut the adjacent femoral neurovascular structures. This appears slightly more prominent compared to the previous CT.    --- End of Report ---    < end of copied text >        ## Medications:  MEDICATIONS  (STANDING):  chlorhexidine 0.12% Liquid 15 milliLiter(s) Oral Mucosa every 12 hours  chlorhexidine 2% Cloths 1 Application(s) Topical <User Schedule>  [id number]   Infusion. 0.5 MICROgram(s)/kG/Hr (3.24 mL/Hr) IV Continuous <Continuous>  hydrocortisone sodium succinate Injectable 100 milliGRAM(s) IV Push every 8 hours  ketamine Infusion. 0.25 mG/kG/Hr (1.62 mL/Hr) IV Continuous <Continuous>  midodrine. 15 milliGRAM(s) Oral three times a day  pantoprazole  Injectable 40 milliGRAM(s) IV Push daily  piperacillin/tazobactam IVPB.. 3.375 Gram(s) IV Intermittent every 12 hours  polyethylene glycol 3350 17 Gram(s) Oral daily  senna Syrup 5 milliLiter(s) Oral daily    ## O/E:I&O's Summary    03 Sep 2021 07:  -  04 Sep 2021 07:00  --------------------------------------------------------  IN: 1477.8 mL / OUT: 1325 mL / NET: 152.8 mL    04 Sep 2021 07:  -  04 Sep 2021 11:35  --------------------------------------------------------  IN: 37 mL / OUT: 195 mL / NET: -158 mL        POCUS : not indicated  DVT PPX - scd  ## Code status:  Goals of care discussion: [] yes [ X] no  [x] full code  [ ] DNR  [ ] DNI  [ ] CELIA

## 2021-09-05 NOTE — PROGRESS NOTE ADULT - ASSESSMENT
81 year old man with a PMHx of CKD, PCKD, orthostatic hypotension on midodrine, CABG, COPD, p/w fall and right femoral intertrochanteric fracture with hematoma. Pt s/p OR w/ IM nail on 9/1 and tx to ICU in shock, respiratory failure, LAUREN. shock improving        Neuro  hold sedation    Pulm  cont vent support  titrate fio2/peep for sat>90%  doing well on sbt    CVS  shock improving.  stopping pressors  had some hemorrhagic component w/ good BP response - cbc maintained  likely has component from sedation and from his baseline autonomic instability (on midodrine at home for [place])  maintain map>65  decrease steroids to 50 q8  ekg w/o ischemia    Renal  ckd w/ like LAUREN now  monitor UO and electrolytes      Heme  s/p total 5 prbc  tx 1 plt given plt rapid drop from consumption  ct without obvious hematoma    Ortho  cont f/u ortho reccs regarding postop care  DVT ppx w/ SCD given active bleeding    goal to extubate today

## 2021-09-05 NOTE — PROGRESS NOTE ADULT - SUBJECTIVE AND OBJECTIVE BOX
CC: Patient is a 81y old  Male who presents with a chief complaint of Right hip fracture (05 Sep 2021 06:44)      ## HPI:HPI:  82 yo M community ambulator w/PMH of CKD, orthostatic hypotension, CABG more than 12 years ago without issue, HLD, COPD, occasional memory loss/confusion presents to the ED s/p fall. Per daughter pt at baseline, AAOx2 at baseline. Daughter is translating for pt and reports pt got his foot got stuck and fell. Fall Witnessed by grandson. No head-strike or LOC. Pt on ASA 81mg. Denies AC use. Denies fever/chills, cough, CP, SOB, N/V, abdominal pain or headache. Pt unable to ambulate. Pt main complaint is right hip pain     	No fever/chills, No photophobia/eye pain/changes in vision, No ear pain/sore throat/dysphagia, No chest pain/palpitations, no SOB/cough/wheeze/stridor, No abdominal pain, No N/V/D, no dysuria/frequency/discharge, No neck/back pain, no rash, no changes in neurological status/function.  (31 Aug 2021 17:35)      O/N: no events    ## ROS:  unable to assess ros due to mental status   ## EXAM  ICU Vital Signs Last 24 Hrs  T(C): 36.1 (05 Sep 2021 05:01), Max: 36.4 (04 Sep 2021 19:05)  T(F): 97 (05 Sep 2021 05:01), Max: 97.5 (04 Sep 2021 19:05)  HR: 93 (05 Sep 2021 14:00) (62 - 93)  BP: 180/77 (05 Sep 2021 14:00) (114/78 - 206/78)  BP(mean): 106 (05 Sep 2021 14:00) (81 - 139)  ABP: --  ABP(mean): --  RR: 17 (05 Sep 2021 14:00) (11 - 19)  SpO2: 98% (05 Sep 2021 14:00) (76% - 100%)    CON : NAD  EENT : MMM  NECK : Full ROM  RESP : CTAB no increased WOB  CARD : rrr no m/r/g  ABD : S NT ND NABS no rebound  EXT : b/l ue and le edema  NEURO : squeezes hand when commanded   ## Labs:  Lab Results:  CBC  CBC Full  -  ( 05 Sep 2021 04:39 )  WBC Count : 20.02 K/uL  RBC Count : 3.46 M/uL  Hemoglobin : 10.1 g/dL  Hematocrit : 29.7 %  Platelet Count - Automated : 80 K/uL  Mean Cell Volume : 85.8 fl  Mean Cell Hemoglobin : 29.2 pg  Mean Cell Hemoglobin Concentration : 34.0 gm/dL  Auto Neutrophil # : x  Auto Lymphocyte # : x  Auto Monocyte # : x  Auto Eosinophil # : x  Auto Basophil # : x  Auto Neutrophil % : x  Auto Lymphocyte % : x  Auto Monocyte % : x  Auto Eosinophil % : x  Auto Basophil % : x    .		Differential:	[] Automated		[] Manual  Chemistry                        10.1   20.02 )-----------( 80       ( 05 Sep 2021 04:39 )             29.7     09-05    142  |  109<H>  |  80<H>  ----------------------------<  127<H>  3.2<L>   |  23  |  3.55<H>    Ca    7.6<L>      05 Sep 2021 04:39  Phos  5.1     09-05  Mg     2.7     09-04    TPro  5.4<L>  /  Alb  2.0<L>  /  TBili  1.4<H>  /  DBili  x   /  AST  448<H>  /  ALT  116<H>  /  AlkPhos  130<H>  09-05    LIVER FUNCTIONS - ( 05 Sep 2021 04:39 )  Alb: 2.0 g/dL / Pro: 5.4 gm/dL / ALK PHOS: 130 U/L / ALT: 116 U/L / AST: 448 U/L / GGT: x                 ABG - ( 03 Sep 2021 23:37 )  pH, Arterial: x     pH, Blood: 7.32  /  pCO2: 40    /  pO2: 121   / HCO3: 20    / Base Excess: -4.9  /  SaO2: 99                MICROBIOLOGY/CULTURES:  Culture Results:   Few Pseudomonas aeruginosa  Normal Respiratory Prerna present (09-03 @ 00:38)  Culture Results:   No growth (09-02 @ 08:58)  Culture Results:   No growth to date. (09-02 @ 08:51)  Culture Results:   No growth to date. (09-02 @ 08:51)            ## Medications:  MEDICATIONS  (STANDING):  chlorhexidine 0.12% Liquid 15 milliLiter(s) Oral Mucosa every 12 hours  chlorhexidine 2% Cloths 1 Application(s) Topical <User Schedule>  hydrocortisone sodium succinate Injectable 50 milliGRAM(s) IV Push every 8 hours  ketamine Infusion. 0.25 mG/kG/Hr (1.62 mL/Hr) IV Continuous <Continuous>  midodrine. 15 milliGRAM(s) Oral three times a day  pantoprazole  Injectable 40 milliGRAM(s) IV Push daily  piperacillin/tazobactam IVPB.. 3.375 Gram(s) IV Intermittent every 12 hours  polyethylene glycol 3350 17 Gram(s) Oral daily  senna Syrup 5 milliLiter(s) Oral daily    ## O/E:I&O's Summary    04 Sep 2021 07:01  -  05 Sep 2021 07:00  --------------------------------------------------------  IN: 410 mL / OUT: 1670 mL / NET: -1260 mL    05 Sep 2021 07:01  -  05 Sep 2021 14:24  --------------------------------------------------------  IN: 6 mL / OUT: 0 mL / NET: 6 mL        POCUS : not indicated  DVT PPX scd - will restart tomorrow  ## Code status:  Goals of care discussion: [] yes [ ] no  [x] full code  [ ] DNR  [ ] DNI  [ ] CELIA

## 2021-09-05 NOTE — PROGRESS NOTE ADULT - SUBJECTIVE AND OBJECTIVE BOX
[facility] NEPHROLOGY SERVICES, [facility]  NEPHROLOGY AND HYPERTENSION  [address]  [address]  [place] [address]  [phone]    MD ELSA SANTA, MD KELVIN CARRIZALES, MD ЕКАТЕРИНА ARITA, MD TOYIN JEAN, MD SUSY LEIJA MD          Patient events noted    Intubated off sedation  family at bedside      MEDICATIONS  (STANDING):  chlorhexidine 0.12% Liquid 15 milliLiter(s) Oral Mucosa every 12 hours  chlorhexidine 2% Cloths 1 Application(s) Topical <User Schedule>  hydrocortisone sodium succinate Injectable 50 milliGRAM(s) IV Push every 8 hours  ketamine Infusion. 0.25 mG/kG/Hr (1.62 mL/Hr) IV Continuous <Continuous>  midodrine. 15 milliGRAM(s) Oral three times a day  pantoprazole  Injectable 40 milliGRAM(s) IV Push daily  piperacillin/tazobactam IVPB.. 3.375 Gram(s) IV Intermittent every 12 hours  polyethylene glycol 3350 17 Gram(s) Oral daily  senna Syrup 5 milliLiter(s) Oral daily    MEDICATIONS  (PRN):  sodium chloride 0.9% lock flush 10 milliLiter(s) IV Push every 1 hour PRN Pre/post blood products, medications, blood draw, and to maintain line patency      09-04-21 @ 07:01  -  09-05-21 @ 07:00  --------------------------------------------------------  IN: 410 mL / OUT: 1670 mL / NET: -1260 mL    09-05-21 @ 07:01  -  09-05-21 @ 20:03  --------------------------------------------------------  IN: 256 mL / OUT: 900 mL / NET: -644 mL      PHYSICAL EXAM:      T(C): 36.2 (09-05-21 @ 18:45), Max: 36.2 (09-05-21 @ 18:45)  HR: 106 (09-05-21 @ 19:30) (66 - 106)  BP: 147/66 (09-05-21 @ 19:30) (147/66 - 206/78)  RR: 14 (09-05-21 @ 19:30) (11 - 19)  SpO2: 97% (09-05-21 @ 19:30) (95% - 99%)  Wt(kg): --  Lungs clear  Heart S1S2  Abd soft NT ND  Extremities:   tr edema                                    10.1   20.02 )-----------( 80       ( 05 Sep 2021 04:39 )             29.7     09-05    142  |  109<H>  |  80<H>  ----------------------------<  127<H>  3.2<L>   |  23  |  3.55<H>    Ca    7.6<L>      05 Sep 2021 04:39  Phos  5.1     09-05  Mg     2.7     09-04    TPro  5.4<L>  /  Alb  2.0<L>  /  TBili  1.4<H>  /  DBili  x   /  AST  448<H>  /  ALT  116<H>  /  AlkPhos  130<H>  09-05    ABG - ( 03 Sep 2021 23:37 )  pH, Arterial: x     pH, Blood: 7.32  /  pCO2: 40    /  pO2: 121   / HCO3: 20    / Base Excess: -4.9  /  SaO2: 99                LIVER FUNCTIONS - ( 05 Sep 2021 04:39 )  Alb: 2.0 g/dL / Pro: 5.4 gm/dL / ALK PHOS: 130 U/L / ALT: 116 U/L / AST: 448 U/L / GGT: x           Creatinine Trend: 3.55<--, 3.29<--, 3.17<--, 3.28<--, 3.32<--, 3.23<--  Mode: AC/ CMV (Assist Control/ Continuous Mandatory Ventilation)  RR (machine): 16  TV (machine): 400  FiO2: 30  PEEP: 5  ITime: 0.8  MAP: 14  PIP: 21    Trend Bun/Cr  09-05-21 @ 04:39  BUN/CR -  80<H> / 3.55<H>  09-04-21 @ 04:26  BUN/CR -  66<H> / 3.29<H>  09-03-21 @ 13:01  BUN/CR -  61<H> / 3.17<H>  09-03-21 @ 03:03  BUN/CR -  57<H> / 3.28<H>  09-02-21 @ 19:27  BUN/CR -  53<H> / 3.32<H>  09-02-21 @ 09:31  BUN/CR -  53<H> / 3.23<H>  09-02-21 @ 00:05  BUN/CR -  35<H> / 1.68<H>  09-01-21 @ 23:11  BUN/CR -  52<H> / 2.99<H>  09-01-21 @ 06:42  BUN/CR -  53<H> / 2.70<H>  08-31-21 @ 18:41  BUN/CR -  52<H> / 2.56<H>  08-31-21 @ 15:33  BUN/CR -  50<H> / 2.78<H>  07-28-21 @ 15:56  BUN/CR -  56<H> / 2.94<H>    Assessment   LAUREN CKD4; ischemic ATN risk   BP medication adjustments   Stable      Plan:  Plan extubation as per PCCM  Discussed with daughter at bedside     Jimbo Hutchinson MD

## 2021-09-05 NOTE — PROGRESS NOTE ADULT - ASSESSMENT
A/P:  81yMale Stable POD4 s/p R IMN    -ICU and medical management appreciated  -Total 6units PRBC/1Plt this admission, continue to trend H/H, Goal Hgb>8.0, recommend 1:1 resuscitation for traumatic anemia   -Patient weaned from pressor support per CCU team  -CT RLE obtained yesterday and reviewed, no acute findings  -Swelling of b/l UE noted. Would recommend elevating b/l UE  -Per CCU plan for possible SBT today   -Blood and urine cx NGTD. Sputum culture +pseudomonas, on zosyn   -WBAT; PT/OT once medically stable  -Pain control PRN  -DVT ppx held due to severe hypotension requiring pressor support, to be restarted when appropriate via medicine/CCU teams  -Dispo pending PT evaluation, unable to work with patient due to current sedation/status  -Discussed with attending Dr. Kyle who agrees with plan

## 2021-09-05 NOTE — PROGRESS NOTE ADULT - SUBJECTIVE AND OBJECTIVE BOX
Orthopedics    Patient seen and examined at bedside. Remains intubated and off sedation. Transfused 1U PRBC on 9/3 with adequate response. Patient became hypertensive overnight and require hydralazine. No acute events overnight. Plan for Spontaneous Breathing Trial today if remains stable.     ICU Vital Signs Last 24 Hrs  T(C): 36.1 (05 Sep 2021 05:01), Max: 37.8 (04 Sep 2021 08:00)  T(F): 97 (05 Sep 2021 05:01), Max: 100.1 (04 Sep 2021 08:00)  HR: 76 (05 Sep 2021 06:40) (61 - 79)  BP: 150/69 (05 Sep 2021 06:30) (84/50 - 206/78)  BP(mean): 92 (05 Sep 2021 06:30) (60 - 139)  ABP: --  ABP(mean): --  RR: 16 (05 Sep 2021 06:40) (13 - 19)  SpO2: 97% (05 Sep 2021 06:40) (76% - 100%)        Culture - Sputum (collected 03 Sep 2021 00:38)  Source: .Sputum Sputum  Gram Stain (prelim) (03 Sep 2021 18:09):    Numerous polymorphonuclear leukocytes per low power field    Rare Squamous epithelial cells per low power field    Moderate Gram Variable Rods seen per oil power field    Few Gram positive cocci in pairs seen per oil power field  Preliminary Report (03 Sep 2021 18:09):    Few Pseudomonas aeruginosa    Culture - Urine (collected 02 Sep 2021 08:58)  Source: Catheterized Catheterized  Final Report (03 Sep 2021 05:09):    No growth    Culture - Blood (collected 02 Sep 2021 08:51)  Source: .Blood Blood  Preliminary Report (03 Sep 2021 09:01):    No growth to date.    Culture - Blood (collected 02 Sep 2021 08:51)  Source: .Blood Blood  Preliminary Report (03 Sep 2021 09:01):    No growth to date.      Exam:  Gen: In CCU, off pressors, intubated, unable to participate in exam  RLE:   Aquacel Dressings reinforced with ABD's and paper tape , will require change today  Unable to evaluate sensation and motor secondary to sedation  Does not withdraw to pain RLE  +DP/PT 2+  Unable to evaluate calf TTP.  Thigh is swollen but compartments soft and compressible

## 2021-09-06 NOTE — PROGRESS NOTE ADULT - SUBJECTIVE AND OBJECTIVE BOX
[facility] NEPHROLOGY SERVICES, [facility]  NEPHROLOGY AND HYPERTENSION  [address]  [address]  [place] [address]  [phone]    MD ELSA SANTA, MD KELVIN CARRIZALES MD YELENA ROSENBERG, MD TOYIN JEAN, MD SUSY LEIJA MD          Patient events noted  No distress  Intubated    MEDICATIONS  (STANDING):  chlorhexidine 0.12% Liquid 15 milliLiter(s) Oral Mucosa every 12 hours  chlorhexidine 2% Cloths 1 Application(s) Topical <User Schedule>  dexMEDEtomidine Infusion 0.4 MICROgram(s)/kG/Hr (6.47 mL/Hr) IV Continuous <Continuous>  hydrocortisone sodium succinate Injectable 50 milliGRAM(s) IV Push every 8 hours  midodrine. 10 milliGRAM(s) Oral three times a day  pantoprazole  Injectable 40 milliGRAM(s) IV Push daily  piperacillin/tazobactam IVPB.. 3.375 Gram(s) IV Intermittent every 12 hours  polyethylene glycol 3350 17 Gram(s) Oral daily  senna Syrup 5 milliLiter(s) Oral daily    MEDICATIONS  (PRN):  sodium chloride 0.9% lock flush 10 milliLiter(s) IV Push every 1 hour PRN Pre/post blood products, medications, blood draw, and to maintain line patency      09-05-21 @ 07:01  -  09-06-21 @ 07:00  --------------------------------------------------------  IN: 2093.7 mL / OUT: 1700 mL / NET: 393.7 mL    09-06-21 @ 07:01  -  09-06-21 @ 20:28  --------------------------------------------------------  IN: 713.2 mL / OUT: 1600 mL / NET: -886.8 mL      PHYSICAL EXAM:      T(C): 36.1 (09-06-21 @ 19:00), Max: 36.4 (09-05-21 @ 23:30)  HR: 75 (09-06-21 @ 20:00) (73 - 133)  BP: 145/67 (09-06-21 @ 20:00) (64/46 - 195/100)  RR: 11 (09-06-21 @ 20:00) (10 - 24)  SpO2: 99% (09-06-21 @ 20:00) (94% - 100%)  Wt(kg): --  Lungs clear  Heart S1S2  Abd soft NT ND  Extremities:   tr edema                                    11.5   22.35 )-----------( 34       ( 06 Sep 2021 00:23 )             32.7     09-06    142  |  113<H>  |  89<H>  ----------------------------<  170<H>  3.2<L>   |  21<L>  |  3.44<H>    Ca    8.1<L>      06 Sep 2021 00:22  Phos  3.3     09-06  Mg     2.6     09-06    TPro  5.4<L>  /  Alb  1.9<L>  /  TBili  4.5<H>  /  DBili  x   /  AST  403<H>  /  ALT  105<H>  /  AlkPhos  285<H>  09-06      LIVER FUNCTIONS - ( 06 Sep 2021 00:22 )  Alb: 1.9 g/dL / Pro: 5.4 gm/dL / ALK PHOS: 285 U/L / ALT: 105 U/L / AST: 403 U/L / GGT: x           Creatinine Trend: 3.44<--, 3.55<--, 3.29<--, 3.17<--, 3.28<--, 3.32<--  Mode: CPAP with PS  FiO2: 30  PEEP: 5  PS: 5  ITime: 1  MAP: 6  PIP: 10      Assessment   LAUREN CKD 4; stable    Plan:  Weaning protocol  Will follow     Jimbo Hutchinson MD

## 2021-09-06 NOTE — PROGRESS NOTE ADULT - SUBJECTIVE AND OBJECTIVE BOX
24 hr events:  tolerated weaning today  still with lots of this yellow ETT secretions  blood pressure still very labile. Overnight with hypotension SBP 70-80s. Given midodrine 10mg and pt now hypertensive (midodrine held)  no fevers today, no hypothermia  R thigh incision still reported by nursing staff to be oozing from time to time and saturates surgical dressings- which have been changed by ortho    ## ROS:  [x ] unable to obtain due to intubation/sedation      ## Labs:  CBC:               Complete Blood Count + Automated Diff (09.06.21 @ 00:23)    WBC Count: 22.35 K/uL    RBC Count: 3.86 M/uL    Hemoglobin: 11.5 g/dL    Hematocrit: 32.7 %    Mean Cell Volume: 84.7 fl    Mean Cell Hemoglobin: 29.8 pg    Mean Cell Hemoglobin Conc: 35.2 gm/dL    Red Cell Distrib Width: 18.3 %    Platelet Count - Automated: 34: History Verified. K/uL    Auto Neutrophil #: 18.67 K/uL    Auto Lymphocyte #: 0.38 K/uL    Auto Monocyte #: 1.67 K/uL    Auto Eosinophil #: 0.01 K/uL    Auto Basophil #: 0.03 K/uL    Auto Neutrophil %: 83.6 %    Auto Lymphocyte %: 1.7 %    Auto Monocyte %: 7.5 %    Auto Eosinophil %: 0.0 %    Auto Basophil %: 0.1 %    Auto Immature Granulocyte %: 7.1: (Includes meta, myelo and promyelocytes) %    Nucleated RBC: 0 /100 WBCs    Comprehensive Metabolic Panel (09.06.21 @ 00:22)    Sodium, Serum: 142 mmol/L    Potassium, Serum: 3.2: Specimen slightly hemolyzed mmol/L    Chloride, Serum: 113 mmol/L    Carbon Dioxide, Serum: 21 mmol/L    Anion Gap, Serum: 8 mmol/L    Blood Urea Nitrogen, Serum: 89 mg/dL    Creatinine, Serum: 3.44 mg/dL    Glucose, Serum: 170 mg/dL    Calcium, Total Serum: 8.1 mg/dL    Protein Total, Serum: 5.4 gm/dL    Albumin, Serum: 1.9 g/dL    Bilirubin Total, Serum: 4.5 mg/dL    Alkaline Phosphatase, Serum: 285 U/L    Aspartate Aminotransferase (AST/SGOT): 403 U/L    Alanine Aminotransferase (ALT/SGPT): 105 U/L    Culture - Sputum . (09.03.21 @ 00:38)    Specimen Source: .Sputum Sputum    Culture Results: Few Pseudomonas aeruginosa        ## Imaging:  CXR < from: Xray Chest 1 View- PORTABLE-Routine (Xray Chest 1 View- PORTABLE-Routine .) (09.03.21 @ 14:09) >  Nasogastric tube in satisfactory position. Endotracheal tube left internal jugular line reidentified unchanged in position. No pneumothorax or other gross change heart and lungs.          ## Medications:  piperacillin/tazobactam IVPB.. 3.375 Gram(s) IV Intermittent every 12 hours    midodrine. 10 milliGRAM(s) Oral three times a day      hydrocortisone sodium succinate Injectable 50 milliGRAM(s) IV Push every 8 hours      pantoprazole  Injectable 40 milliGRAM(s) IV Push daily  polyethylene glycol 3350 17 Gram(s) Oral daily  senna Syrup 5 milliLiter(s) Oral daily    dexMEDEtomidine Infusion 0.4 MICROgram(s)/kG/Hr IV Continuous <Continuous>      ## Vitals:  T(C): 36.5 (09-06-21 @ 23:49), Max: 36.5 (09-06-21 @ 23:49)  HR: 90  (73 - 117)  BP: 150/78 (64/46 - 195/100)  BP(mean): 100  (54 - 125)  RR: 18  (10 - 22)  SpO2: 99%  (96% - 100%)    Vent: Mode: AC/ CMV (Assist Control/ Continuous Mandatory Ventilation), RR (machine): 16, RR (patient): 18, TV (machine): 400, FiO2: 30, PEEP: 5, PIP: 16        09-05 @ 07:01  -  09-06 @ 07:00  --------------------------------------------------------  IN: 2093.7 mL / OUT: 1700 mL / NET: 393.7 mL    09-06 @ 07:01  -  09-07 @ 03:08  --------------------------------------------------------  IN: 1082.2 mL / OUT: 1775 mL / NET: -692.8 mL          ## P/E:  Gen: lying comfortably in bed in no apparent distress, sedated  HEENT: ETT in place  Resp: scattered rhonchi  CVS: RRR  Abd: soft ND +BS  Ext: bilateral upper and lower extremity edema (pitting)  Neuro: sedated, arouses with family calling pt's name in his native language    CENTRAL LINE: [x ] YES [ ] NO  LOCATION:   DATE INSERTED:  REMOVE: [ ] YES [ ] NO      SANDERS: [x ] YES [ ] NO    DATE INSERTED:  REMOVE:  [ ] YES [ ] NO      A-LINE:  [ ] YES [x ] NO  LOCATION:   DATE INSERTED:  REMOVE:  [ ] YES [ ] NO  EXPLAIN:    GLOBAL ISSUE/BEST PRACTICE:  Analgesia: on fentanyl  Sedation: yes  HOB elevation: yes  Stress ulcer prophylaxis: protonix  VTE prophylaxis: bilateral compression devices  Oral Care: chlorhexidine   Glycemic control: sliding scale coverage as needed  Nutrition: tube feeds    CODE STATUS: [ x] full code  [ ] DNR  [ ] DNI  [ ] MOLST  Goals of care discussion: [x ] yes

## 2021-09-06 NOTE — PROGRESS NOTE ADULT - ASSESSMENT
81M PMH orthostatic hypotension chronically on midodrine TID with baseline BP 90s/40s per daughter, CAD s/p CABG >12 years ago, HLD, COPD, CKD 3 (baseline Cr 2.3-2.9), glaucoma with blindness, occasional memory loss/confusion ?dementia presents to the ED on 8/31 s/p mechanical fall. Admitted with an acute, comminuted, displaced right femoral intertrochanteric fracture with noted hematoma throughout the surrounding musculature. s/p RRT 8/31 for hypotension SBP 50s with noted Hb drop 8.1-> 6.9. Given 250mL IVF bolus and 2units pRBC transfusion. Taken to OR night of 9/1 s/p Intramedullary nailing of right femur with reported 100mL blood loss intraoperatively and intraoperative hypotension per ortho. Post op with recurrent hypotension, hypothermia, AMS. Transferred to ICU for hypotension/shock state. Found to be anemic Hb 6.2, lactate 5.2, Cr 2.9, pH 7.2 -> 7.0, HCO3: 10. Urgently intubated with hemodynamic instability and lethargy.     DX: hemorrhagic shock, septic shock, acute on chronic hypotension, acute blood loss anemia, severe metabolic / lactic acidosis, acute metabolic encephalopathy, acute (type 4) respiratory failure requiring intubation, pseudomonas PNA.     NEURO  sedation vacation as tolerates    CV  hemorrhagic shock + septic shock  off IV vasopressors (previously required levo, lianet, vaso)  midodrine - given at 5AM due to hypotension, now hypertensive  hold midodrine, if BP drops again then can taper down midodrine to 5mg dose      PULM  mechanical ventilation  daily weaning as tolerates  hold on extubation today due to copious amount of ETT secretions      HEME  acute post procedural blood loss anemia  prbc transfusion as needed  H/H has been stable  still with noted oozing from staple sites but dark brown  ortho aware of bleeding and oozing, new dressing change performed by ortho      RENAL  metabolic acidosis  s/p bicarb infusion for severe metabolic acidosis  now off bicarb drip      ID  cont with zosyn for pseudomonas PNA      GEN  full code  DVT prophylaxis with bilateral compression devices due to post operative bleeding  prognosis is extremely guarded.   Critical Care Time: 35 min

## 2021-09-06 NOTE — PROGRESS NOTE ADULT - ASSESSMENT
A/P:  81yMale Stable POD5 s/p R IMN    -ICU and medical management appreciated  -Total 6units PRBC/1Plt this admission, continue to trend H/H, Goal Hgb>8.0, recommend 1:1 resuscitation for traumatic anemia   -Patient weaned from pressor support and today restarted midodrine and given 1L bolus due to low blood pressures, per ICU team  -Swelling of b/l UE noted. Would recommend elevating b/l UE  -Per intubated and on ventilator after CPAP trial on 9/6. Will follow with ICU for plans of SBT   -Blood and urine cx NGTD. Sputum culture +pseudomonas, on zosyn   -WBAT; PT/OT once medically stable  -Pain control PRN  -DVT ppx held due to severe hypotension previously requiring pressor support, to be restarted when appropriate via medicine/ICU teams  -Dispo pending PT evaluation, unable to work with patient due to current medical status/sedation  -Discussed with attending Dr. Kyle who agrees with plan A/P:  81yMale Stable POD5 s/p R IMN    -ICU and medical management appreciated  -Total 6units PRBC/1Plt this admission, continue to trend H/H, Goal Hgb>8.0, recommend 1:1 resuscitation for traumatic anemia   -Patient weaned from pressor support and today restarted midodrine and given 1L bolus due to low blood pressures, per ICU team  -Swelling of b/l UE noted. Would recommend elevating b/l UE  -Per intubated and on ventilator after CPAP trial on 9/6. Will follow with ICU for plans of SBT   -Blood and urine cx NGTD. Sputum culture +pseudomonas, on zosyn   -WBAT; PT/OT once medically stable  -Pain control PRN  -DVT ppx held due to severe hypotension previously requiring pressor support, to be restarted when appropriate via medicine/ICU teams  -Dispo pending PT evaluation, unable to work with patient due to current medical status/sedation  -Will discuss with attending Dr. Kyle and advise if any changes to plan A/P:  81yMale Stable POD5 s/p R IMN    -ICU and medical management appreciated  -Total 6units PRBC/1Plt this admission, continue to trend H/H, Goal Hgb>8.0, recommend 1:1 resuscitation for traumatic anemia   -Patient weaned from pressor support and today restarted midodrine and given 1L bolus due to low blood pressures, per ICU team  -Swelling of b/l UE noted. Would recommend elevating b/l UE  -Intubated and on ventilator after CPAP trial on 9/6. Will follow with ICU for plans of SBT   -Blood and urine cx NGTD. Sputum culture +pseudomonas, on zosyn   -WBAT; PT/OT once medically stable  -Pain control PRN  -DVT ppx held due to severe hypotension previously requiring pressor support, to be restarted when appropriate via medicine/ICU teams  -Dispo pending PT evaluation, unable to work with patient due to current medical status/sedation  -Will discuss with attending Dr. Kyle and advise if any changes to plan

## 2021-09-06 NOTE — PROGRESS NOTE ADULT - SUBJECTIVE AND OBJECTIVE BOX
Orthopedics    Patient seen and examined at bedside. Remains sedated/intubated and on the ventilator after a trial of CPAP on 9/5. Midodrine restarted after low bps early this AM and given 1L bolus with improvement in bps to 100s.     ICU Vital Signs Last 24 Hrs  T(C): 36.2 (06 Sep 2021 03:07), Max: 36.4 (05 Sep 2021 23:30)  T(F): 97.2 (06 Sep 2021 03:07), Max: 97.5 (05 Sep 2021 23:30)  HR: 110 (06 Sep 2021 07:00) (73 - 133)  BP: 144/74 (06 Sep 2021 07:00) (64/46 - 201/100)  BP(mean): 92 (06 Sep 2021 07:00) (54 - 125)  RR: 17 (06 Sep 2021 07:00) (11 - 24)  SpO2: 98% (06 Sep 2021 07:00) (94% - 100%)    Labs:  POCT Blood Glucose.: 166 mg/dL (05 Sep 2021 23:53)                          11.5   22.35 )-----------( 34       ( 06 Sep 2021 00:23 )             32.7       Auto Neutrophil %: 83.6 % (09-06-21 @ 00:23)  Auto Immature Granulocyte %: 7.1 % (09-06-21 @ 00:23)    09-06    142  |  113<H>  |  89<H>  ----------------------------<  170<H>  3.2<L>   |  21<L>  |  3.44<H>      LFTs:             5.4  | 4.5  | 403      ------------------[285     ( 06 Sep 2021 00:22 )  1.9  | x    | 105            Lactate, Blood: 1.4 mmol/L (09-03-21 @ 13:01)    ABG - ( 03 Sep 2021 23:37 )  pH: x     /  pCO2: 40    /  pO2: 121   / HCO3: 20    / Base Excess: -4.9  /  SaO2: 99        ABG - ( 03 Sep 2021 11:48 )  pH: x     /  pCO2: 26    /  pO2: 49    / HCO3: 19    / Base Excess: -3.4  /  SaO2: 81        ABG - ( 03 Sep 2021 10:04 )  pH: x     /  pCO2: 23    /  pO2: 84    / HCO3: 19    / Base Excess: -2.7  /  SaO2: 97          Exam:  Gen: In ICU, off pressors, intubated, unable to participate in exam secondary to sedation  RLE:   Aquacel Dressings in place saturated but not draining, will change  Unable to evaluate sensation and motor secondary   Does not withdraw to pain RLE  +DP/PT 2+  Unable to evaluate calf TTP.  Thigh is swollen but compartments soft and compressible       Orthopedics    Patient seen and examined at bedside. Remains sedated/intubated and on the ventilator after a trial of CPAP on 9/5. Midodrine restarted after low bps early this AM and given 1L bolus with improvement in bps to 100s.     ICU Vital Signs Last 24 Hrs  T(C): 36.2 (06 Sep 2021 03:07), Max: 36.4 (05 Sep 2021 23:30)  T(F): 97.2 (06 Sep 2021 03:07), Max: 97.5 (05 Sep 2021 23:30)  HR: 110 (06 Sep 2021 07:00) (73 - 133)  BP: 144/74 (06 Sep 2021 07:00) (64/46 - 201/100)  BP(mean): 92 (06 Sep 2021 07:00) (54 - 125)  RR: 17 (06 Sep 2021 07:00) (11 - 24)  SpO2: 98% (06 Sep 2021 07:00) (94% - 100%)    Labs:  POCT Blood Glucose.: 166 mg/dL (05 Sep 2021 23:53)                          11.5   22.35 )-----------( 34       ( 06 Sep 2021 00:23 )             32.7       Auto Neutrophil %: 83.6 % (09-06-21 @ 00:23)  Auto Immature Granulocyte %: 7.1 % (09-06-21 @ 00:23)    09-06    142  |  113<H>  |  89<H>  ----------------------------<  170<H>  3.2<L>   |  21<L>  |  3.44<H>      LFTs:             5.4  | 4.5  | 403      ------------------[285     ( 06 Sep 2021 00:22 )  1.9  | x    | 105            Lactate, Blood: 1.4 mmol/L (09-03-21 @ 13:01)    ABG - ( 03 Sep 2021 23:37 )  pH: x     /  pCO2: 40    /  pO2: 121   / HCO3: 20    / Base Excess: -4.9  /  SaO2: 99        ABG - ( 03 Sep 2021 11:48 )  pH: x     /  pCO2: 26    /  pO2: 49    / HCO3: 19    / Base Excess: -3.4  /  SaO2: 81        ABG - ( 03 Sep 2021 10:04 )  pH: x     /  pCO2: 23    /  pO2: 84    / HCO3: 19    / Base Excess: -2.7  /  SaO2: 97          Exam:  Gen: In ICU, off pressors, intubated, unable to participate in exam secondary to sedation  RLE:   Aquacel Dressings in place saturated but not draining, will change  Unable to evaluate sensation and motor secondary to sedation  Does not withdraw to pain RLE  +DP/PT 2+  Unable to evaluate calf TTP.  Thigh is swollen but compartments soft and compressible

## 2021-09-07 NOTE — PROGRESS NOTE ADULT - ASSESSMENT
82 yo M community ambulator w/PMH of CKD, orthostatic hypotension, CABG more than 12 years ago without issue, HLD, COPD, occasional memory loss/confusion presents to the ED s/p fall. Per daughter pt at baseline, AAOx2 at baseline. Daughter is translating for pt and reports pt got his foot got stuck and fell. Fall Witnessed by grandson. No head-strike or LOC. Pt on ASA 81mg. Denies AC use. Denies fever/chills, cough, CP, SOB, N/V, abdominal pain or headache. Pt unable to ambulate. Pt main complaint is right hip pain       R hip fractures/p R IMN  Management per ortho    Acute respiratory failure  Extubated    Shock, septic v hemorrhagic, resolved  Acute on Chronic hypotension on midodrine  off pressors  On midodrine and steroids  empiric antiobitcs  continue to monitor  blood and urine cultures ngtd    Pseudomonas PNA  On Zosyn    Acute blood loss anemia  Stable  likely from bleeding from fracture,   CT pelvis and RLE reviewed, no hematoma  monitor cbc  transfuse to keep >7    Transaminitis  likely due to shock  improving  trend LFTs    Tachycardia, resolved  Likely due to sepsis/shock    LAUREN, CKD4  likely due to rhabdo v shock  improving  s/p bicrab drip  continue to monitor  nephrology following    Hypokalemia  monitor and replete prn    Hyponatremia  resolved    COPD    HLD  on statin     DVT ppx SCDs

## 2021-09-07 NOTE — PROGRESS NOTE ADULT - SUBJECTIVE AND OBJECTIVE BOX
ABDIRASHID QUIROGA  81y  Male    Patient is a 81y old  Male who presents with a chief complaint of Right hip fracture (01 Sep 2021 10:41)      INTERVAL HPI/OVERNIGHT EVENTS: Seen for follow up for medical management. S/p extubation and off pressors    ROS:  Unable to obtain due to clinical status      PAST MEDICAL & SURGICAL HISTORY:  Glaucoma    Blindness of right eye    CAD (coronary artery disease)    HTN (hypertension)    Chronic kidney disease (CKD)    COPD without exacerbation    H/O coronary artery bypass surgery      MEDICATIONS  (STANDING):  atorvastatin Oral Tab/Cap - Peds 40 milliGRAM(s) Oral Once  budesonide 160 MICROgram(s)/formoterol 4.5 MICROgram(s) Inhaler 2 Puff(s) Inhalation two times a day    MEDICATIONS  (PRN):    Allergies    No Known Allergies    Intolerances      SOCIAL HISTORY:    FAMILY HISTORY:  No pertinent family history in first degree relatives      Vital Signs Last 24 Hrs  T(C): 36.7 (31 Aug 2021 15:31), Max: 36.7 (31 Aug 2021 13:48)  T(F): 98 (31 Aug 2021 15:31), Max: 98 (31 Aug 2021 13:48)  HR: 78 (31 Aug 2021 15:31) (64 - 78)  BP: 113/56 (31 Aug 2021 16:42) (92/59 - 120/93)  BP(mean): --  RR: 19 (31 Aug 2021 15:31) (17 - 19)  SpO2: 98% (31 Aug 2021 15:31) (97% - 98%)    PHYSICAL EXAM:    GENERAL: NAD, well-groomed, well-developed  HEAD:  Atraumatic, Normocephalic  EYES: EOMI, PERRLA, conjunctiva and sclera clear  ENMT: Moist mucous membranes  NECK: Supple, No JVD, Normal thyroid  NERVOUS SYSTEM: Not alert, not oriented  CHEST/LUNG: Clear to auscultation bilaterally; No rales, rhonchi, wheezing, or rubs  HEART: Regular rate and rhythm; No murmurs, rubs, or gallops  ABDOMEN: Soft, Nontender, Nondistended; Bowel sounds present  EXTREMITIES: surgical site, c/d.i  LYMPH: No lymphadenopathy noted  SKIN: No rashes or lesions    LABS:  Reviewed      RADIOLOGY & ADDITIONAL STUDIES:  Reviwed

## 2021-09-07 NOTE — PROGRESS NOTE ADULT - ASSESSMENT
81M PMH orthostatic hypotension chronically on midodrine TID with baseline BP 90s/40s per daughter, CAD s/p CABG >12 years ago, HLD, COPD, CKD 3 (baseline Cr 2.3-2.9), glaucoma with blindness, occasional memory loss/confusion ?dementia presents to the ED on 8/31 s/p mechanical fall. Admitted with an acute, comminuted, displaced right femoral intertrochanteric fracture with noted hematoma throughout the surrounding musculature. s/p RRT 8/31 for hypotension SBP 50s with noted Hb drop 8.1-> 6.9. Given 250mL IVF bolus and 2units pRBC transfusion. Taken to OR night of 9/1 s/p Intramedullary nailing of right femur with reported 100mL blood loss intraoperatively and intraoperative hypotension per ortho. Post op with recurrent hypotension, hypothermia, AMS. Transferred to ICU for hypotension/shock state. Found to be anemic Hb 6.2, lactate 5.2, Cr 2.9, pH 7.2 -> 7.0, HCO3: 10. Urgently intubated with hemodynamic instability and lethargy. Weaned and extubated 9/7. BP labile- Hypertensive today post extubation    DX: hemorrhagic shock, septic shock, acute on chronic hypotension, acute blood loss anemia, severe metabolic / lactic acidosis, acute metabolic encephalopathy, acute (type 4) respiratory failure requiring intubation, pseudomonas PNA.     NEURO  monitor mental status  lethargy from residual sedative effects +/- pain medication    CV  hemorrhagic shock + septic shock  off IV vasopressors (previously required levo, lianet, vaso)  midodrine - held this evening with HTN      PULM  weaned and extubated today  monitor respiratory status  suction as needed  zosyn for pseudomonas PNA      HEME  acute post procedural blood loss anemia  prbc transfusion as needed  H/H has been stable  still with noted oozing from staple sites but dark brown no further fresh bleeding  ortho aware of oozing, new dressing change performed by ortho      RENAL  metabolic acidosis resolved  s/p bicarb infusion    ID  cont with zosyn for pseudomonas PNA      GEN  full code  DVT prophylaxis with bilateral compression devices due to post operative bleeding  PT     Critical Care Time: 35 min

## 2021-09-07 NOTE — PROGRESS NOTE ADULT - SUBJECTIVE AND OBJECTIVE BOX
24 hr events:  weaned and extubated today  secretions improved  BP labile; episodes of HTN  still with oozing (browinsh fluid from R thigh) requiring dressing changes    ## ROS:  [x ] unable to obtain due to encephalopathy    ## Labs:  CBC:                           9.2    17.21 )-----------( 23       ( 07 Sep 2021 02:18 )             26.8     09-07    142  |  113<H>  |  110<H>  ----------------------------<  191<H>  3.2<L>   |  23  |  3.21<H>    Ca    8.2<L>      07 Sep 2021 02:18  Phos  3.1     09-07  Mg     2.5     09-07    TPro  5.3<L>  /  Alb  1.5<L>  /  TBili  4.7<H>  /  DBili  x   /  AST  230<H>  /  ALT  79<H>  /  AlkPhos  590<H>  09-07      ## Medications:  piperacillin/tazobactam IVPB.. 3.375 Gram(s) IV Intermittent every 12 hours    midodrine. 10 milliGRAM(s) Oral three times a day      dextrose 40% Gel 15 Gram(s) Oral once  dextrose 50% Injectable 25 Gram(s) IV Push once  dextrose 50% Injectable 12.5 Gram(s) IV Push once  dextrose 50% Injectable 25 Gram(s) IV Push once  glucagon  Injectable 1 milliGRAM(s) IntraMuscular once  hydrocortisone sodium succinate Injectable 50 milliGRAM(s) IV Push every 8 hours  insulin lispro (ADMELOG) corrective regimen sliding scale   SubCutaneous every 6 hours      pantoprazole  Injectable 40 milliGRAM(s) IV Push daily  polyethylene glycol 3350 17 Gram(s) Oral daily  senna Syrup 5 milliLiter(s) Oral daily    fentaNYL    Injectable 25 MICROGram(s) IV Push every 6 hours PRN  oxyCODONE    IR 5 milliGRAM(s) Oral every 6 hours PRN      ## Vitals:  T(C): 36.7 (09-07-21 @ 15:30), Max: 36.7 (09-07-21 @ 07:47)  HR: 91 (09-07-21 @ 15:30) (71 - 97)  BP: 194/86 (09-07-21 @ 15:30) (92/52 - 194/86)  RR: 23 (09-07-21 @ 15:30) (10 - 23)  SpO2: 99% (09-07-21 @ 15:30) (96% - 100%)    Vent: Mode: CPAP with PS, RR (patient): 19, FiO2: 30, PEEP: 5, PS: 5, PIP: 10  ABG:         ## P/E:  Gen: lying comfortably in bed in no apparent distress  HEENT: NC/AT, neck supple  Resp: CTA B/L   CVS: RRR  Abd: soft NT/ND +BS  Ext: right thigh edema but soft, brownish drainage from incision site, staples in place  Neuro: arousable, able to squeeze hands, move feet    CENTRAL LINE: [x ] YES [ ] NO  LOCATION:   DATE INSERTED:  REMOVE: [ ] YES [ ] NO      SANDERS: [x ] YES [ ] NO    DATE INSERTED:  REMOVE:  [ ] YES [ ] NO      A-LINE:  [ ] YES [x ] NO  LOCATION:   DATE INSERTED:  REMOVE:  [ ] YES [ ] NO  EXPLAIN:    GLOBAL ISSUE/BEST PRACTICE:  Analgesia: prn dialudid  Sedation: n/a  HOB elevation: yes  Stress ulcer prophylaxis: protonix  VTE prophylaxis: bilateral compression devices  Oral Care: n/a  Glycemic control: yes  Nutrition: npo post extubation    CODE STATUS: [x ] full code  [ ] DNR  [ ] DNI  [ ] MOLST  Goals of care discussion: [ x] yes

## 2021-09-07 NOTE — PROGRESS NOTE ADULT - SUBJECTIVE AND OBJECTIVE BOX
[facility] NEPHROLOGY SERVICES, [facility]  NEPHROLOGY AND HYPERTENSION  [address]  [address]  [place] [address]  [phone]    MD ELSA SANTA MD ANDREY GONCHARUK, MD MADHU KORRAPATI, MD YELENA ROSENBERG, MD BINNY KOSHY, MD CHRISTOPHER CAPUTO, MD EDWARD BOVER, MD          Patient events noted    MEDICATIONS  (STANDING):  chlorhexidine 2% Cloths 1 Application(s) Topical <User Schedule>  dexMEDEtomidine Infusion 0.4 MICROgram(s)/kG/Hr (6.47 mL/Hr) IV Continuous <Continuous>  dextrose 40% Gel 15 Gram(s) Oral once  dextrose 5%. 1000 milliLiter(s) (50 mL/Hr) IV Continuous <Continuous>  dextrose 5%. 1000 milliLiter(s) (100 mL/Hr) IV Continuous <Continuous>  dextrose 50% Injectable 25 Gram(s) IV Push once  dextrose 50% Injectable 12.5 Gram(s) IV Push once  dextrose 50% Injectable 25 Gram(s) IV Push once  glucagon  Injectable 1 milliGRAM(s) IntraMuscular once  hydrocortisone sodium succinate Injectable 50 milliGRAM(s) IV Push every 8 hours  insulin lispro (ADMELOG) corrective regimen sliding scale   SubCutaneous every 6 hours  midodrine. 10 milliGRAM(s) Oral three times a day  pantoprazole  Injectable 40 milliGRAM(s) IV Push daily  piperacillin/tazobactam IVPB.. 3.375 Gram(s) IV Intermittent every 12 hours  polyethylene glycol 3350 17 Gram(s) Oral daily  senna Syrup 5 milliLiter(s) Oral daily    MEDICATIONS  (PRN):  fentaNYL    Injectable 25 MICROGram(s) IV Push every 6 hours PRN Severe Pain (7 - 10)  oxyCODONE    IR 5 milliGRAM(s) Oral every 6 hours PRN Moderate Pain (4 - 6)  sodium chloride 0.9% lock flush 10 milliLiter(s) IV Push every 1 hour PRN Pre/post blood products, medications, blood draw, and to maintain line patency      09-06-21 @ 07:01  -  09-07-21 @ 07:00  --------------------------------------------------------  IN: 1334.6 mL / OUT: 2400 mL / NET: -1065.4 mL    09-07-21 @ 07:01  -  09-07-21 @ 16:06  --------------------------------------------------------  IN: 315.5 mL / OUT: 125 mL / NET: 190.5 mL      PHYSICAL EXAM:      T(C): 36.7 (09-07-21 @ 15:30), Max: 36.7 (09-07-21 @ 07:47)  HR: 91 (09-07-21 @ 15:30) (71 - 97)  BP: 194/86 (09-07-21 @ 15:30) (92/52 - 194/86)  RR: 23 (09-07-21 @ 15:30) (10 - 23)  SpO2: 99% (09-07-21 @ 15:30) (96% - 100%)  Wt(kg): --  Lungs clear  Heart S1S2  Abd soft NT ND  Extremities:   tr edema                                    9.2    17.21 )-----------( 23       ( 07 Sep 2021 02:18 )             26.8     09-07    142  |  113<H>  |  110<H>  ----------------------------<  191<H>  3.2<L>   |  23  |  3.21<H>    Ca    8.2<L>      07 Sep 2021 02:18  Phos  3.1     09-07  Mg     2.5     09-07    TPro  5.3<L>  /  Alb  1.5<L>  /  TBili  4.7<H>  /  DBili  x   /  AST  230<H>  /  ALT  79<H>  /  AlkPhos  590<H>  09-07      LIVER FUNCTIONS - ( 07 Sep 2021 02:18 )  Alb: 1.5 g/dL / Pro: 5.3 gm/dL / ALK PHOS: 590 U/L / ALT: 79 U/L / AST: 230 U/L / GGT: x           Creatinine Trend: 3.21<--, 3.44<--, 3.55<--, 3.29<--, 3.17<--, 3.28<--  Mode: CPAP with PS  FiO2: 30  PEEP: 5  PS: 5  ITime: 1  MAP: 7  PIP: 10      Assessment   LAUREN CKD 4; stable  Replete K    Plan:  Weaning protocol  Will follow     Jimbo Hutchinson MD

## 2021-09-07 NOTE — PROGRESS NOTE ADULT - SUBJECTIVE AND OBJECTIVE BOX
Orthopedics      Patient seen and examined at bedside. Remains intubated on light sedation, extubation held yesterday for secretions. Blood pressures labile overnight.    Vital Signs Last 24 Hrs  T(C): 36.5 (09-07-21 @ 04:00), Max: 36.5 (09-06-21 @ 23:49)  T(F): 97.7 (09-07-21 @ 04:00), Max: 97.7 (09-06-21 @ 23:49)  HR: 94 (09-07-21 @ 06:00) (73 - 110)  BP: 159/83 (09-07-21 @ 06:00) (109/68 - 195/100)  BP(mean): 104 (09-07-21 @ 06:00) (81 - 125)  RR: 20 (09-07-21 @ 06:00) (10 - 22)  SpO2: 100% (09-07-21 @ 06:00) (96% - 100%)                        9.2    17.21 )-----------( 23       ( 07 Sep 2021 02:18 )             26.8     07 Sep 2021 02:18    142    |  113    |  110    ----------------------------<  191    3.2     |  23     |  3.21     Ca    8.2        07 Sep 2021 02:18  Phos  3.1       07 Sep 2021 02:18  Mg     2.5       07 Sep 2021 02:18    TPro  5.3    /  Alb  1.5    /  TBili  4.7    /  DBili  x      /  AST  230    /  ALT  79     /  AlkPhos  590    07 Sep 2021 02:18        Exam:  Gen: NAD, resting comfortably, intubated, does not respond to commands  RLE:  Dressing saturated  Grossly moving RLE but does not move on command  Sensory unable to be assessed 2' sedation   No pain response with calf palpation   Compartments soft and compressible  2+ Pulses palpable      A/P:  81yMale Stable POD6 s/p R IMN    -Needs dressing change today   -ICU and medical management appreciated  -Total 6units PRBC/1Plt this admission, continue to trend H/H, Goal Hgb>8.0, recommend 1:1 resuscitation for traumatic anemia  -Thrombocytopenic this morning, consider heme/onc input   -Patient weaned from pressor support and on midodrine intermittently  -Swelling of b/l UE noted. Would recommend elevating b/l UE  -Continue weaning trials per ICU  -Blood and urine cx NGTD. Sputum culture +pseudomonas, on zosyn   -WBAT; PT/OT once medically stable  -Pain control PRN  -DVT ppx held due to severe hypotension previously requiring pressor support, to be restarted when appropriate via medicine/ICU teams  -Dispo pending PT evaluation, unable to work with patient due to current medical status/sedation  -Will discuss with attending Dr. Kyle and advise if any changes to plan

## 2021-09-08 NOTE — PROGRESS NOTE ADULT - SUBJECTIVE AND OBJECTIVE BOX
ABDIRASHID QUIROGA  81y  Male    Patient is a 81y old  Male who presents with a chief complaint of Right hip fracture (01 Sep 2021 10:41)      INTERVAL HPI/OVERNIGHT EVENTS: Seen for follow up for medical management. S/p extubation and off pressors.    ROS:  Unable to obtain due to clinical status      PAST MEDICAL & SURGICAL HISTORY:  Glaucoma    Blindness of right eye    CAD (coronary artery disease)    HTN (hypertension)    Chronic kidney disease (CKD)    COPD without exacerbation    H/O coronary artery bypass surgery      MEDICATIONS  (STANDING):  atorvastatin Oral Tab/Cap - Peds 40 milliGRAM(s) Oral Once  budesonide 160 MICROgram(s)/formoterol 4.5 MICROgram(s) Inhaler 2 Puff(s) Inhalation two times a day    MEDICATIONS  (PRN):    Allergies    No Known Allergies    Intolerances      SOCIAL HISTORY:    FAMILY HISTORY:  No pertinent family history in first degree relatives      Vital Signs Last 24 Hrs  T(C): 36.7 (31 Aug 2021 15:31), Max: 36.7 (31 Aug 2021 13:48)  T(F): 98 (31 Aug 2021 15:31), Max: 98 (31 Aug 2021 13:48)  HR: 78 (31 Aug 2021 15:31) (64 - 78)  BP: 113/56 (31 Aug 2021 16:42) (92/59 - 120/93)  BP(mean): --  RR: 19 (31 Aug 2021 15:31) (17 - 19)  SpO2: 98% (31 Aug 2021 15:31) (97% - 98%)    PHYSICAL EXAM:    GENERAL: NAD, well-groomed, well-developed  HEAD:  Atraumatic, Normocephalic  EYES: EOMI, PERRLA, conjunctiva and sclera clear  ENMT: Moist mucous membranes  NECK: Supple, No JVD, Normal thyroid  NERVOUS SYSTEM: Not alert, not oriented  CHEST/LUNG: Clear to auscultation bilaterally; No rales, rhonchi, wheezing, or rubs  HEART: Regular rate and rhythm; No murmurs, rubs, or gallops  ABDOMEN: Soft, Nontender, Nondistended; Bowel sounds present  EXTREMITIES: surgical site, c/d.i  LYMPH: No lymphadenopathy noted  SKIN: No rashes or lesions    LABS:  Reviewed      RADIOLOGY & ADDITIONAL STUDIES:  Reviwed

## 2021-09-08 NOTE — PROGRESS NOTE ADULT - ASSESSMENT
81M PMH orthostatic hypotension chronically on midodrine TID with baseline BP 90s/40s per daughter, CAD s/p CABG >12 years ago, HLD, COPD, CKD 3 (baseline Cr 2.3-2.9), glaucoma with blindness, occasional memory loss/confusion ?dementia presents to the ED on 8/31 s/p mechanical fall. Admitted with an acute, comminuted, displaced right femoral intertrochanteric fracture with noted hematoma throughout the surrounding musculature. s/p RRT 8/31 for hypotension SBP 50s with noted Hb drop 8.1-> 6.9. Given 250mL IVF bolus and 2units pRBC transfusion. Taken to OR night of 9/1 s/p Intramedullary nailing of right femur with reported 100mL blood loss intraoperatively and intraoperative hypotension per ortho. Post op with recurrent hypotension, hypothermia, AMS. Transferred to ICU for hypotension/shock state. Found to be anemic Hb 6.2, lactate 5.2, Cr 2.9, pH 7.2 -> 7.0, HCO3: 10. Urgently intubated with hemodynamic instability and lethargy. Weaned and extubated 9/7. BP labile- Hypertensive post extubation. Course with brief episode of a-fib RVR.     DX: hemorrhagic shock, septic shock, acute on chronic hypotension, acute blood loss anemia, severe metabolic / lactic acidosis, acute metabolic encephalopathy, acute (type 4) respiratory failure requiring intubation, pseudomonas PNA, a-fib RVR    NEURO  monitor mental status  lethargy likely from having received pain meds, sedative meds during intubation should be wearing off  blood gas performed without CO2 elevation causing mental status depression  baseline MS per daughter forgetful AAOx2    CV  hemorrhagic shock + septic shock  off IV vasopressors (previously required levo, lianet, vaso)  midodrine - held with HTN  1 episode of afib RVR after suctioning, treated with cardizem push with resolution  monitor for further events, no AC due to recent bleeding      PULM  weaned and extubated 9/7  monitor respiratory status  chest PT and suctioning as needed  on zosyn for pseudomonas PNA  sounds congested today will also diurese with lasix       HEME  acute post procedural blood loss anemia  prbc transfusion as needed  s/p 6 units pRBC and 1 platelet  goal to keep Hb > 7  still with noted oozing from incision site but dark brown no further fresh bleeding  ortho aware of persistent oozing, new dressing change performed by ortho      RENAL  metabolic acidosis resolved  s/p bicarb infusion  making urine  known CKD with talks of possible dialysis per family but not initiated as of yet    ID  cont with zosyn for pseudomonas PNA  still with leukocytosis    GI  noted LFTs and T bili elevation  leukocytosis  will get abdominal US to evaluate GB and liver  unable to take PO due to lethargy, will insert NGT and start tube feeds      GEN  full code  DVT prophylaxis with bilateral compression devices due to post operative bleeding  PT  monitor in ICU respiratory status with lethargy      Critical Care Time: 40 min 81M PMH orthostatic hypotension chronically on midodrine TID with baseline BP 90s/40s per daughter, CAD s/p CABG >12 years ago, HLD, COPD, CKD 3 (baseline Cr 2.3-2.9), glaucoma with blindness, occasional memory loss/confusion ?dementia presents to the ED on 8/31 s/p mechanical fall. Admitted with an acute, comminuted, displaced right femoral intertrochanteric fracture with noted hematoma throughout the surrounding musculature. s/p RRT 8/31 for hypotension SBP 50s with noted Hb drop 8.1-> 6.9. Given 250mL IVF bolus and 2units pRBC transfusion. Taken to OR night of 9/1 s/p Intramedullary nailing of right femur with reported 100mL blood loss intraoperatively and intraoperative hypotension per ortho. Post op with recurrent hypotension, hypothermia, AMS. Transferred to ICU for hypotension/shock state. Found to be anemic Hb 6.2, lactate 5.2, Cr 2.9, pH 7.2 -> 7.0, HCO3: 10. Urgently intubated with hemodynamic instability and lethargy. Weaned and extubated 9/7. BP labile- Hypertensive post extubation. Course with brief episode of a-fib RVR.     DX: hemorrhagic shock, septic shock, acute on chronic hypotension, acute blood loss anemia, severe metabolic / lactic acidosis, acute metabolic encephalopathy, acute (type 4) respiratory failure requiring intubation, pseudomonas PNA, a-fib RVR    NEURO  monitor mental status  lethargy likely from having received pain meds, sedative meds during intubation should be wearing off  blood gas performed without CO2 elevation causing mental status depression  monitor BUN in setting of renal insufficiency, if BUN continues to rise ?uremia a component of AMS and lethagy  baseline MS per daughter forgetful AAOx2    CV  hemorrhagic shock + septic shock  off IV vasopressors (previously required levo, lianet, vaso)  midodrine - held with HTN  1 episode of afib RVR after suctioning, treated with cardizem push with resolution  monitor for further events, no AC due to recent bleeding      PULM  weaned and extubated 9/7  monitor respiratory status  chest PT and suctioning as needed  on zosyn for pseudomonas PNA  sounds congested today will also diurese with lasix       HEME  acute post procedural blood loss anemia  prbc transfusion as needed  s/p 6 units pRBC and 1 platelet  goal to keep Hb > 7  still with noted oozing from incision site but dark brown no further fresh bleeding  ortho aware of persistent oozing, new dressing change performed by ortho      RENAL  metabolic acidosis resolved  s/p bicarb infusion  making urine  known CKD with talks of possible dialysis per family but not initiated as of yet  renal follow up appreciated    ID  cont with zosyn for pseudomonas PNA  still with leukocytosis    GI  noted LFTs and T bili elevation  leukocytosis  will get abdominal US to evaluate GB and liver  unable to take PO due to lethargy, will insert NGT and start tube feeds      GEN  full code  DVT prophylaxis with bilateral compression devices due to post operative bleeding  PT  monitor in ICU respiratory status with lethargy      Critical Care Time: 40 min

## 2021-09-08 NOTE — PROGRESS NOTE ADULT - ASSESSMENT
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 Per daughter pt at baseline, AAOx2 at baseline. Daughter is translating for pt and reports pt got his foot got stuck and fell. Fall Witnessed by grandson. No head-strike or LOC. Pt on ASA 81mg. Denies AC use. Denies fever/chills, cough, CP, SOB, N/V, abdominal pain or headache. Pt unable to ambulate. Pt main complaint is right hip pain       R hip fractures/p R IMN  Management per ortho    Acute respiratory failure  Extubated    Shock, septic v hemorrhagic, resolved  Acute on Chronic hypotension on midodrine  off pressors  On midodrine and steroids  empiric antiobitcs  continue to monitor  blood and urine cultures ngtd    Pseudomonas PNA  On Zosyn    Acute blood loss anemia  Stable  likely from bleeding from fracture,   CT pelvis and RLE reviewed, no hematoma  monitor cbc  transfuse to keep >7    Transaminitis  likely due to shock  improving  trend LFTs    Tachycardia, resolved  Likely due to sepsis/shock    LAUREN, CKD4  likely due to rhabdo v shock  improving  s/p bicrab drip  continue to monitor  nephrology following    leukocytosis  On hydrocortisone  On Zosyn for pseudomonas PNA    Hypokalemia  monitor and replete prn    Hyponatremia  resolved    COPD    HLD  on statin     DVT ppx SCDs

## 2021-09-08 NOTE — PROGRESS NOTE ADULT - SUBJECTIVE AND OBJECTIVE BOX
Orthopedics      Patient seen and examined at bedside. Patient was extubated yesterday successfully. Blood pressures labile overnight. Patient not interactive on exam    Vital Signs Last 24 Hrs  T(C): 36.6 (08 Sep 2021 04:42), Max: 36.7 (07 Sep 2021 07:47)  T(F): 97.9 (08 Sep 2021 04:42), Max: 98 (07 Sep 2021 07:47)  HR: 100 (08 Sep 2021 06:00) (71 - 154)  BP: 127/83 (08 Sep 2021 06:00) (92/48 - 194/86)  BP(mean): 95 (08 Sep 2021 06:00) (60 - 167)  RR: 30 (08 Sep 2021 06:00) (13 - 30)  SpO2: 95% (08 Sep 2021 06:00) (93% - 100%)    Exam:  Gen: NAD, resting comfortably, minimal movement to stimulation   RLE:  Dressing saturated, to be changed  Grossly moving RLE but does not move on command  Sensory unable to be assessed 2' mental status  No pain response with calf palpation   Compartments soft and compressible  2+ Pulses palpable      A/P:  81yMale Stable POD6 s/p 7 IMN    -Needs dressing change today   -ICU and medical management appreciated  -Total 6units PRBC/1Plt this admission, continue to trend H/H, Goal Hgb>8.0, recommend 1:1 resuscitation for traumatic anemia  -Thrombocytopenic this morning, consider heme/onc input   -Patient blood pressors are labile  -Patient extubated successfully  -Swelling of b/l UE noted. Would recommend elevating b/l UE  -Blood and urine cx NGTD. Sputum culture +pseudomonas, on zosyn   -WBAT; PT/OT once medically stable  -Pain control PRN  -DVT ppx held due to severe hypotension previously requiring pressor support, to be restarted when appropriate via medicine/ICU teams  -Dispo pending PT evaluation, unable to work with patient due to current medical status/sedation  -Will discuss with attending Dr. Kyle and advise if any changes to plan

## 2021-09-08 NOTE — PROGRESS NOTE ADULT - SUBJECTIVE AND OBJECTIVE BOX
24 hr events:  episode of a-fib RVR yesterday night given cardizem with resolution  lethargic today   blood gas without hypercarbia  BP elevated in the 150s - midodrine held    ## ROS:  [x ] unable to obtain due to lethargy      ## Labs:  CBC:                        7.2    24.94 )-----------( 49       ( 08 Sep 2021 18:00 )             20.9     09-08    145  |  115<H>  |  145<H>  ----------------------------<  167<H>  3.6   |  22  |  3.71<H>    Ca    8.3<L>      08 Sep 2021 02:00  Phos  3.4     09-08  Mg     2.6     09-08    TPro  5.0<L>  /  Alb  1.6<L>  /  TBili  5.4<H>  /  DBili  x   /  AST  195<H>  /  ALT  80<H>  /  AlkPhos  808<H>  09-08       ## Medications:  piperacillin/tazobactam IVPB.. 3.375 Gram(s) IV Intermittent every 12 hours    dextrose 40% Gel 15 Gram(s) Oral once  dextrose 50% Injectable 25 Gram(s) IV Push once  dextrose 50% Injectable 12.5 Gram(s) IV Push once  dextrose 50% Injectable 25 Gram(s) IV Push once  glucagon  Injectable 1 milliGRAM(s) IntraMuscular once  hydrocortisone sodium succinate Injectable 25 milliGRAM(s) IV Push every 8 hours  insulin lispro (ADMELOG) corrective regimen sliding scale   SubCutaneous every 6 hours      pantoprazole  Injectable 40 milliGRAM(s) IV Push daily  polyethylene glycol 3350 17 Gram(s) Oral daily  senna Syrup 5 milliLiter(s) Oral daily    fentaNYL    Injectable 25 MICROGram(s) IV Push every 6 hours PRN  oxyCODONE    IR 5 milliGRAM(s) Oral every 6 hours PRN      ## Vitals:  T(C): 36.4 (09-08-21 @ 19:59), Max: 36.6 (09-08-21 @ 04:42)  HR: 99 (09-08-21 @ 21:00) (88 - 142)  BP: 153/91 (09-08-21 @ 21:00) (104/66 - 171/72)  RR: 22 (09-08-21 @ 21:00) (16 - 30)  SpO2: 98% (09-08-21 @ 21:00) (92% - 100%)      ABG: ABG - ( 08 Sep 2021 14:44 )  pH, Arterial:  pH, Blood: 7.42  /  pCO2: 31    /  pO2: 85    / HCO3: 20    / Base Excess: -4.0  /  SaO2: 97            09-07-21 @ 07:01  -  09-08-21 @ 07:00  --------------------------------------------------------  IN: 415.5 mL / OUT: 525 mL / NET: -109.5 mL              ## P/E:  Gen: lying comfortably in bed in no apparent distress, lathargic  HEENT: NC/AT  Resp: bilateral rhonchi  CVS: RRR  Abd: soft NT/ND +BS  Ext: bilateral upper and lower extremity edema. R thigh edematous but soft   Neuro: lethargic but arousable, able to follow simple commands: squeeze hands and move feet      GLOBAL ISSUE/BEST PRACTICE:  Analgesia: yes  Sedation: n/a  HOB elevation: yes  Stress ulcer prophylaxis: protonix  VTE prophylaxis: bilateral compression devices  Oral Care: n/a  Glycemic control: yes  Nutrition: insert NGT and start tube feeds    CODE STATUS: [x ] full code  [ ] DNR  [ ] DNI  [ ] MOLST  Goals of care discussion: [x ] yes

## 2021-09-08 NOTE — PROGRESS NOTE ADULT - SUBJECTIVE AND OBJECTIVE BOX
[facility] NEPHROLOGY SERVICES, [facility]  NEPHROLOGY AND HYPERTENSION  [address]  [address]  [place] [address]  [phone]    MD ELSA SANTA MD ANDREY GONCHARUK, MD MADHU KORRAPATI, MD YELENA ROSENBERG, MD BINNY KOSHY, MD CHRISTOPHER CAPUTO, MD EDWARD BOVER, MD          Patient events noted  No distress  extubated; alert x 0    MEDICATIONS  (STANDING):  chlorhexidine 2% Cloths 1 Application(s) Topical <User Schedule>  dextrose 40% Gel 15 Gram(s) Oral once  dextrose 5%. 1000 milliLiter(s) (50 mL/Hr) IV Continuous <Continuous>  dextrose 5%. 1000 milliLiter(s) (100 mL/Hr) IV Continuous <Continuous>  dextrose 50% Injectable 25 Gram(s) IV Push once  dextrose 50% Injectable 12.5 Gram(s) IV Push once  dextrose 50% Injectable 25 Gram(s) IV Push once  glucagon  Injectable 1 milliGRAM(s) IntraMuscular once  hydrocortisone sodium succinate Injectable 50 milliGRAM(s) IV Push every 8 hours  insulin lispro (ADMELOG) corrective regimen sliding scale   SubCutaneous every 6 hours  midodrine. 2.5 milliGRAM(s) Oral three times a day  pantoprazole  Injectable 40 milliGRAM(s) IV Push daily  piperacillin/tazobactam IVPB.. 3.375 Gram(s) IV Intermittent every 12 hours  polyethylene glycol 3350 17 Gram(s) Oral daily  senna Syrup 5 milliLiter(s) Oral daily  sodium chloride 0.45%. 1000 milliLiter(s) (100 mL/Hr) IV Continuous <Continuous>    MEDICATIONS  (PRN):  fentaNYL    Injectable 25 MICROGram(s) IV Push every 6 hours PRN Severe Pain (7 - 10)  oxyCODONE    IR 5 milliGRAM(s) Oral every 6 hours PRN Moderate Pain (4 - 6)  sodium chloride 0.9% lock flush 10 milliLiter(s) IV Push every 1 hour PRN Pre/post blood products, medications, blood draw, and to maintain line patency      09-07-21 @ 07:01  -  09-08-21 @ 07:00  --------------------------------------------------------  IN: 415.5 mL / OUT: 525 mL / NET: -109.5 mL    09-08-21 @ 07:01  -  09-08-21 @ 21:42  --------------------------------------------------------  IN: 1100 mL / OUT: 455 mL / NET: 645 mL      PHYSICAL EXAM:      T(C): 36.4 (09-08-21 @ 19:59), Max: 36.6 (09-08-21 @ 04:42)  HR: 99 (09-08-21 @ 21:00) (88 - 142)  BP: 153/91 (09-08-21 @ 21:00) (104/66 - 171/72)  RR: 22 (09-08-21 @ 21:00) (16 - 30)  SpO2: 98% (09-08-21 @ 21:00) (92% - 100%)  Wt(kg): --  Lungs clear  Heart S1S2  Abd soft NT ND  Extremities:   tr edema                                    7.2    24.94 )-----------( 49       ( 08 Sep 2021 18:00 )             20.9     09-08    145  |  115<H>  |  145<H>  ----------------------------<  167<H>  3.6   |  22  |  3.71<H>    Ca    8.3<L>      08 Sep 2021 02:00  Phos  3.4     09-08  Mg     2.6     09-08    TPro  5.0<L>  /  Alb  1.6<L>  /  TBili  5.4<H>  /  DBili  x   /  AST  195<H>  /  ALT  80<H>  /  AlkPhos  808<H>  09-08    ABG - ( 08 Sep 2021 14:44 )  pH, Arterial: x     pH, Blood: 7.42  /  pCO2: 31    /  pO2: 85    / HCO3: 20    / Base Excess: -4.0  /  SaO2: 97                LIVER FUNCTIONS - ( 08 Sep 2021 02:00 )  Alb: 1.6 g/dL / Pro: 5.0 gm/dL / ALK PHOS: 808 U/L / ALT: 80 U/L / AST: 195 U/L / GGT: x           Creatinine Trend: 3.71<--, 3.21<--, 3.44<--, 3.55<--, 3.29<--, 3.17<--      Assessment   LAUREN CKD 4; pre renal suspected    Plan:  Resume IVF  NGT in place; enteral feeding   Will follow    Jimbo Hutchinson MD

## 2021-09-09 NOTE — PROCEDURE NOTE - NSTRACHPOSTINTU_RESP_A_CORE
Appropriate capnography/Breath sounds bilateral/Breath sounds equal/Chest excursion noted/Chest X-Ray/Positive end tidal Co2 noted
Appropriate capnography/Breath sounds bilateral/Breath sounds equal/Chest excursion noted

## 2021-09-09 NOTE — PROCEDURE NOTE - NSINDICATIONS_GEN_A_CORE
airway protection/mental status change
feeds
dialysis/CRRT
critical patient/mental status change
arterial puncture to obtain ABG's/blood sampling/monitoring purposes
critical patient/monitoring purposes
critical illness/emergency venous access/volume resuscitation

## 2021-09-09 NOTE — PROGRESS NOTE ADULT - SUBJECTIVE AND OBJECTIVE BOX
NEPHROLOGY PROGRESS NOTE    CHIEF COMPLAINT:  LAUREN/CKD    HPI:  Urgently intubated this AM for airway protection as he had bradley blood in his oropharynx and some nose bleeding.  Urea nitrogen rising quickly.  Creatinine now in 4's.  Fluid balance is +1.2 liters with 1 liter urine over 24 hrs.    EXAM:  T(F): 97 (09-09-21 @ 05:05)  HR: 92 (09-09-21 @ 09:35)  BP: 181/81 (09-09-21 @ 07:00)  RR: 21 (09-09-21 @ 07:15)  SpO2: 99% (09-09-21 @ 09:35)    Sedated, intubated with alot of blood in suction cannister  Normal respiratory effort, lungs clear bilaterally  Heart RRR with no murmur, no peripheral edema         LABS                             7.1    22.89 )-----------( 52       ( 09 Sep 2021 02:30 )             20.7          09-09    146<H>  |  112<H>  |  159<H>  ----------------------------<  156<H>  3.7   |  21<L>  |  4.11<H>    Ca    8.7      09 Sep 2021 02:30  Phos  5.0     09-09  Mg     2.9     09-09    TPro  5.0<L>  /  Alb  1.6<L>  /  TBili  5.4<H>  /  DBili  x   /  AST  195<H>  /  ALT  80<H>  /  AlkPhos  808<H>  09-08           RADIOLOGY  Chest X-ray personally reviewed and shows mild basilar fluid/atelectasis      ASSESSMENT  LAUREN on CKD(4), non oliguric  Acute respiratory failure with hemoptysis and thrombocytopenia    RECOMMEND  In view of worsening azotemia, there is a rationale to initiate dialysis which may improve platelet function;  will discuss with critical care team;  agree with ddavp in the interim       NEPHROLOGY PROGRESS NOTE    CHIEF COMPLAINT:  LAUREN/CKD    HPI:  Urgently intubated this AM for airway protection as he had bradley blood in his oropharynx and some nose bleeding.  Urea nitrogen rising quickly.  Creatinine now in 4's.  Fluid balance is +1.2 liters with 1 liter urine over 24 hrs.    EXAM:  T(F): 97 (09-09-21 @ 05:05)  HR: 92 (09-09-21 @ 09:35)  BP: 181/81 (09-09-21 @ 07:00)  RR: 21 (09-09-21 @ 07:15)  SpO2: 99% (09-09-21 @ 09:35)    Sedated, intubated with alot of blood in suction cannister  Normal respiratory effort, lungs clear bilaterally  Heart RRR with no murmur, no peripheral edema         LABS                             7.1    22.89 )-----------( 52       ( 09 Sep 2021 02:30 )             20.7          09-09    146<H>  |  112<H>  |  159<H>  ----------------------------<  156<H>  3.7   |  21<L>  |  4.11<H>    Ca    8.7      09 Sep 2021 02:30  Phos  5.0     09-09  Mg     2.9     09-09    TPro  5.0<L>  /  Alb  1.6<L>  /  TBili  5.4<H>  /  DBili  x   /  AST  195<H>  /  ALT  80<H>  /  AlkPhos  808<H>  09-08           RADIOLOGY  Chest X-ray personally reviewed and shows mild basilar fluid/atelectasis      ASSESSMENT  LAUREN on CKD(4), non oliguric  Acute respiratory failure with hemoptysis and thrombocytopenia    RECOMMEND  In view of worsening azotemia, there is a rationale to initiate dialysis which may improve platelet function;  dose ddavp in interim  Discussed rationale, risks, benefits of dialysis with his daughter, Clary, who provided informed consent  Will dialyze 2.5 hrs tonight with no fluid removal

## 2021-09-09 NOTE — PROGRESS NOTE ADULT - SUBJECTIVE AND OBJECTIVE BOX
QUIROGAABDIRASHID MACK  81y  Male    Patient is a 81y old  Male who presents with a chief complaint of Right hip fracture (01 Sep 2021 10:41)      INTERVAL HPI/OVERNIGHT EVENTS: Seen for follow up for medical management. Intubated for airway protection.    ROS:  Unable to obtain due to clinical status      PAST MEDICAL & SURGICAL HISTORY:  Glaucoma    Blindness of right eye    CAD (coronary artery disease)    HTN (hypertension)    Chronic kidney disease (CKD)    COPD without exacerbation    H/O coronary artery bypass surgery      MEDICATIONS  (STANDING):  atorvastatin Oral Tab/Cap - Peds 40 milliGRAM(s) Oral Once  budesonide 160 MICROgram(s)/formoterol 4.5 MICROgram(s) Inhaler 2 Puff(s) Inhalation two times a day    MEDICATIONS  (PRN):    Allergies    No Known Allergies    Intolerances      SOCIAL HISTORY:    FAMILY HISTORY:  No pertinent family history in first degree relatives      Vital Signs Last 24 Hrs  T(C): 36.7 (31 Aug 2021 15:31), Max: 36.7 (31 Aug 2021 13:48)  T(F): 98 (31 Aug 2021 15:31), Max: 98 (31 Aug 2021 13:48)  HR: 78 (31 Aug 2021 15:31) (64 - 78)  BP: 113/56 (31 Aug 2021 16:42) (92/59 - 120/93)  BP(mean): --  RR: 19 (31 Aug 2021 15:31) (17 - 19)  SpO2: 98% (31 Aug 2021 15:31) (97% - 98%)    PHYSICAL EXAM:    GENERAL: Intubated  HEAD:  Atraumatic, Normocephalic  EYES: PERRLA, conjunctiva and sclera clear  ENMT: Moist mucous membranes  NECK: Supple, No JVD  NERVOUS SYSTEM: Not alert, not oriented  CHEST/LUNG: Clear to auscultation bilaterally  HEART: Regular rate and rhythm  ABDOMEN: Soft, Nontender, Nondistended; Bowel sounds present  EXTREMITIES: surgical site, c/d.i  LYMPH: No lymphadenopathy noted  SKIN: No rashes or lesions    LABS:  Reviewed      RADIOLOGY & ADDITIONAL STUDIES:  Reviwed

## 2021-09-09 NOTE — PROCEDURE NOTE - PROCEDURE DATE TIME, MLM
02-Sep-2021 00:20
02-Sep-2021 18:50
03-Sep-2021
09-Sep-2021 15:17
02-Sep-2021 01:00
09-Sep-2021 09:37
02-Sep-2021 01:00

## 2021-09-09 NOTE — PROCEDURE NOTE - NSPROCDETAILS_GEN_ALL_CORE
guidewire recovered/lumen(s) aspirated and flushed/sterile dressing applied/sterile technique, catheter placed/ultrasound guidance with use of sterile gel and probe cove
location identified, draped/prepped, sterile technique used, needle inserted/introduced/positive blood return obtained via catheter/connected to a pressurized flush line/hemostasis with direct pressure, dressing applied/Seldinger technique/all materials/supplies accounted for at end of procedure
audible air bolus/placement confirmed by auscultation/orogastric/bowel sounds present to 4 quadrants
patient pre-oxygenated, tube inserted, placement confirmed
guidewire recovered/lumen(s) aspirated and flushed/sterile dressing applied/sterile technique, catheter placed/ultrasound guidance with use of sterile gel and probe cove
patient pre-oxygenated, tube inserted, placement confirmed
location identified, draped/prepped, sterile technique used, needle inserted/introduced/positive blood return obtained via catheter/connected to a pressurized flush line/sutured in place/hemostasis with direct pressure, dressing applied/Seldinger technique/all materials/supplies accounted for at end of procedure

## 2021-09-09 NOTE — PROGRESS NOTE ADULT - SUBJECTIVE AND OBJECTIVE BOX
Orthopedics      Patient seen and examined at bedside. Patient remains extubated and off pressors. Receiving 1U PRBC today. Patient removed NGT overnight and will have it replaced. Non-responsive, minimal movement to painful stimuli.      Vital Signs Last 24 Hrs  T(C): 36.3 (08 Sep 2021 23:26), Max: 36.4 (08 Sep 2021 19:59)  T(F): 97.3 (08 Sep 2021 23:26), Max: 97.5 (08 Sep 2021 19:59)  HR: 106 (09 Sep 2021 05:56) (88 - 122)  BP: 183/76 (09 Sep 2021 05:05) (113/62 - 183/76)  BP(mean): 104 (09 Sep 2021 05:05) (75 - 131)  RR: 18 (09 Sep 2021 05:05) (16 - 29)  SpO2: 99% (09 Sep 2021 05:56) (92% - 100%)    Exam:  Gen: NAD, minimal movement to stimulation  Dressing saturated, to be changed  Grossly moving RLE but does not move on command  Sensory unable to be assessed 2' mental status  No pain response with calf palpation   Compartments soft and compressible  +DP       Orthopedics    Patient seen and examined at bedside. Patient remains extubated and off pressors. Receiving 1U PRBC today. Patient removed NGT overnight and will have it replaced. Non-responsive, minimal movement to painful stimuli.      Vital Signs Last 24 Hrs  T(C): 36.3 (08 Sep 2021 23:26), Max: 36.4 (08 Sep 2021 19:59)  T(F): 97.3 (08 Sep 2021 23:26), Max: 97.5 (08 Sep 2021 19:59)  HR: 106 (09 Sep 2021 05:56) (88 - 122)  BP: 183/76 (09 Sep 2021 05:05) (113/62 - 183/76)  BP(mean): 104 (09 Sep 2021 05:05) (75 - 131)  RR: 18 (09 Sep 2021 05:05) (16 - 29)  SpO2: 99% (09 Sep 2021 05:56) (92% - 100%)    Exam:  Gen: NAD, minimal movement to stimulation  Dressing saturated, to be changed  Grossly moving RLE but does not move on command  Sensory unable to be assessed 2' mental status  Compartments soft and compressible  +DP  No pain response with calf palpation

## 2021-09-09 NOTE — PROGRESS NOTE ADULT - ASSESSMENT
82 yo M community ambulator w/PMH of CKD, orthostatic hypotension, CABG more than 12 years ago without issue, HLD, COPD, occasional memory loss/confusion presents to the ED s/p fall. Per daughter pt at baseline, AAOx2 at baseline. Daughter is translating for pt and reports pt got his foot got stuck and fell. Fall Witnessed by grandson. No head-strike or LOC. Pt on ASA 81mg. Denies AC use. Denies fever/chills, cough, CP, SOB, N/V, abdominal pain or headache. Pt unable to ambulate. Pt main complaint is right hip pain       R hip fractures/p R IMN  Management per ortho    Acute respiratory failure  Extubated  Reintubated 9/9 for airway protection    Shock, septic v hemorrhagic, resolved  Acute on Chronic hypotension on midodrine  off pressors and midodrine  On steroids  off empiric antiobitcs  blood and urine cultures ngtd    Pseudomonas PNA  On Zosyn    Acute blood loss anemia  likely from bleeding from fracture,   CT pelvis and RLE reviewed, no hematoma  monitor cbc  transfuse to keep >7    Thrombocytopenia  tranfused plt    Transaminitis  likely due to shock  improving  trend LFTs    Tachycardia, resolved  Likely due to sepsis/shock    LAUREN, CKD4  likely due to rhabdo v shock  nephrology following  HD per neprho    leukocytosis  On hydrocortisone  was on zosyn for pseumondas pna    Hypokalemia  nephrology following    Hypernatremia  nephrology following  TF with free water    COPD  Not in exacerbation    HLD  on statin     DVT ppx SCDs

## 2021-09-09 NOTE — PROCEDURE NOTE - NSPROCNAME_GEN_A_CORE
Arterial Puncture/Cannulation
Gastric Intubation/Gastric Lavage
Tracheal Intubation
Arterial Puncture/Cannulation
Central Line Insertion
Tracheal Intubation
Central Line Insertion

## 2021-09-09 NOTE — PROCEDURE NOTE - NSPOSTPRCRAD_GEN_A_CORE
post-procedure radiography performed
post-procedure radiography performed
central line located in the superior vena cava/post-procedure radiography performed

## 2021-09-09 NOTE — PROGRESS NOTE ADULT - ASSESSMENT
A/P:  81yMale Stable POD6 s/p 8 IMN    -Needs dressing change today   -ICU and medical management appreciated  -Total 6units PRBC/1Plt this admission, receiving 1 UPRBC this Am. Continue to trend H/H, Goal Hgb>8.0, recommend 1:1 resuscitation for traumatic anemia and as patients platelets are low  -Thrombocytopenic continues, consider heme/onc input   -Patient extubated successfully  -NGT placed by ICU yesterday, removed by patient. Will be replaced today  -Swelling of b/l UE noted. Would recommend elevating b/l UE  -Blood and urine cx NGF. Sputum culture +pseudomonas, on zosyn   -WBAT; PT/OT once medically stable  -Pain control PRN  -DVT ppx held due to severe hypotension previously requiring pressor support, to be restarted when appropriate via medicine/ICU teams  -Dispo pending PT evaluation, unable to work with patient due to current medical status/sedation  -Will discuss with attending Dr. Kyle and advise if any changes to plan   A/P:  81yMale Stable POD6 s/p 8 IMN    -Needs dressing change today   -ICU and medical management appreciated  -Total 6units PRBC/1Plt this admission, receiving 1 UPRBC this Am. Continue to trend H/H, Goal Hgb>8.0, recommend 1:1 resuscitation for traumatic anemia and as patients platelets are low  -Thrombocytopenic continues, consider heme/onc input   -Patient remains extubated   -NGT placed by ICU yesterday, removed by patient. Will be replaced today  -Swelling of b/l UE noted. Would recommend elevating b/l UE  -Blood and urine cx NGF. Sputum culture +pseudomonas, on zosyn   -WBAT; PT/OT once medically stable  -Pain control PRN  -DVT ppx held due to severe hypotension previously requiring pressor support, to be restarted when appropriate via medicine/ICU teams  -Dispo pending PT evaluation, unable to work with patient due to current medical status/sedation  -Will discuss with attending Dr. Kyle and advise if any changes to plan   A/P:  81yMale Stable POD6 s/p 8 IMN    -Dressing to be changed today  -ICU and medical management appreciated  -Total 6units PRBC/1Plt this admission; Receiving 1 additional unit PRBC this morning. Continue to trend H/H, Goal Hgb>8.0, recommend 1:1 resuscitation for traumatic anemia and as patients platelets are low  -Thrombocytopenic continues, consider Heme/Onc consult  -Patient remains extubated   -NGT placed by ICU yesterday, removed by patient. Will be replaced today  -Swelling of b/l UE noted. Would recommend elevating b/l UE  -Blood and urine cx NGF. Sputum culture +pseudomonas, on zosyn   -WBAT; PT/OT once medically stable  -Pain control PRN  -DVT ppx held due to severe hypotension previously requiring pressor support, to be restarted when appropriate via medicine/ICU teams  -Dispo pending PT evaluation, unable to work with patient due to current medical status/sedation  -Will discuss with attending Dr. Kyle and advise if any changes to plan

## 2021-09-09 NOTE — PROCEDURE NOTE - NSSITEPREP_SKIN_A_CORE
chlorhexidine/Adherence to aseptic technique: hand hygiene prior to donning barriers (gown, gloves), don cap and mask, sterile drape over patient
chlorhexidine
chlorhexidine/Adherence to aseptic technique: hand hygiene prior to donning barriers (gown, gloves), don cap and mask, sterile drape over patient
chlorhexidine

## 2021-09-09 NOTE — CHART NOTE - NSCHARTNOTEFT_GEN_A_CORE
Daughter Clary Dillard called and updated on patient condition. Aware patient reintubated and required blood and platelets transfusion.

## 2021-09-09 NOTE — PROGRESS NOTE ADULT - ASSESSMENT
81M w/ orthostatic hypotension (baseline BP90/40 on midrodrine), CAD s/p CABG, HLD< COPD, CKD, glaucoma, dementia. Admitted s/p fall found to have displaced R femoral intratrochanteric fracture w/ hematoma. S/P OR on 9/1 for IMN - post-op pt was hypotensive, hypothermic w/ AMS. Transferred to ICU for further care. Pt was intubated for worsening shock state and metabolic acidosis. Pt eventually extubated on 9/7. Course also complicated by LAUREN on CKD, thrombocytopenia and pseudomonas pna. This morning pt bleeding from nasopharynx so re-intubated for airway protection and coagulopathy corrected. Noted to have rising BUN which may also be contributing to bleeding.     #Neuro - sedation w/ propofol; pain control as needed when awake  #CV - HD stable, BP Has been rising so midodrine stopped and hydrocortisone tapered; monitor for now  #Pulm - re-intubated this morning for bleeding from what appears nasopharynx; will try to wean tomorrow as long as bleeding stops; currently on minimal vent settings otherwise  #ID- on zosyn for pseudomonas in sputum - can d/c after today's dose to complete 7 days  #Renal/metabolic - LAUREN on CKD likely prerenal ATN; renal function continues to worsen particularly BUN and in setting of bleeding and AMS would favor HD - plan for HD catheter placement and initiation of HD, appreciate nephrology input  #GI- noted to have blood coming from OGT when placed - doubt this is GI bleeding but will keep NGT to ILWS for now and if no further bleeding can start TF; continue w/ protonix; RUQ performed in setting of rising LFTs - no acute findings, continue to trend LFTs  #Heme - pt continued to ooze from surgical site, ortho aware; now bleeding from nose likely from trying to place NGT on setting of thrombocytopenia and uremia - s/p 1 pRBC, 1 plts and DDAVP in setting of acte bleeding; thrombocytopenia has not improved and etiology is unclear- will repeat CBC this afternoon and check coags and DIC panel  #Endo - hydrosortisone tapering down, FS acceptable  #Skin - dressing of R hip IMN as per ortho  #PPx - SCDs in setting of continued bleeding and thrombocytopenia  #Dispo- prognosis very guarded; remains in ICU intubated again today; full code for now

## 2021-09-09 NOTE — PROGRESS NOTE ADULT - SUBJECTIVE AND OBJECTIVE BOX
CHIEF COMPLAINT:    Interval Events:    REVIEW OF SYSTEMS:  Constitutional: [ ] fevers [ ] chills [ ] weight loss [ ] weight gain  HEENT: [ ] dry eyes [ ] eye irritation [ ] postnasal drip [ ] nasal congestion  CV: [ ] chest pain [ ] orthopnea [ ] palpitations [ ] murmur  Resp: [ ] cough [ ] shortness of breath [ ] dyspnea [ ] wheezing [ ] sputum [ ] hemoptysis  GI: [ ] nausea [ ] vomiting [ ] diarrhea [ ] constipation [ ] abd pain [ ] dysphagia   : [ ] dysuria [ ] nocturia [ ] hematuria [ ] increased urinary frequency  Musculoskeletal: [ ] back pain [ ] myalgias [ ] arthralgias [ ] fracture  Skin: [ ] rash [ ] itch  Neurological: [ ] headache [ ] dizziness [ ] syncope [ ] weakness [ ] numbness  Hematologic/Lymphatic: [ ] anemia [ ] bleeding problem  Allergic/Immunologic: [ ] itchy eyes [ ] nasal discharge [ ] hives [ ] angioedema  [ ] All other systems negative  [ ] Unable to assess ROS because ________    OBJECTIVE:  ICU Vital Signs Last 24 Hrs  T(C): 36.1 (09 Sep 2021 05:05), Max: 36.4 (08 Sep 2021 19:59)  T(F): 97 (09 Sep 2021 05:05), Max: 97.5 (08 Sep 2021 19:59)  HR: 90 (09 Sep 2021 07:15) (88 - 122)  BP: 181/81 (09 Sep 2021 07:00) (113/62 - 196/113)  BP(mean): 108 (09 Sep 2021 07:00) (75 - 137)  ABP: --  ABP(mean): --  RR: 21 (09 Sep 2021 07:15) (16 - 27)  SpO2: 93% (09 Sep 2021 07:15) (93% - 100%)        09-08 @ 07:01  -  09-09 @ 07:00  --------------------------------------------------------  IN: 2252 mL / OUT: 1015 mL / NET: 1237 mL      CAPILLARY BLOOD GLUCOSE      POCT Blood Glucose.: 178 mg/dL (09 Sep 2021 06:19)      PHYSICAL EXAM:  General:   HEENT:   Neck:   Respiratory:   Cardiovascular:   Abdomen:   Extremities:   Skin:   Neurological:  Psychiatry:    LINES:    HOSPITAL MEDICATIONS:  MEDICATIONS  (STANDING):  chlorhexidine 2% Cloths 1 Application(s) Topical <User Schedule>  dextrose 40% Gel 15 Gram(s) Oral once  dextrose 5%. 1000 milliLiter(s) (50 mL/Hr) IV Continuous <Continuous>  dextrose 5%. 1000 milliLiter(s) (100 mL/Hr) IV Continuous <Continuous>  dextrose 50% Injectable 25 Gram(s) IV Push once  dextrose 50% Injectable 12.5 Gram(s) IV Push once  dextrose 50% Injectable 25 Gram(s) IV Push once  furosemide   Injectable 40 milliGRAM(s) IV Push once  glucagon  Injectable 1 milliGRAM(s) IntraMuscular once  hydrocortisone sodium succinate Injectable 25 milliGRAM(s) IV Push every 8 hours  insulin lispro (ADMELOG) corrective regimen sliding scale   SubCutaneous every 6 hours  pantoprazole  Injectable 40 milliGRAM(s) IV Push daily  piperacillin/tazobactam IVPB.. 3.375 Gram(s) IV Intermittent every 12 hours  polyethylene glycol 3350 17 Gram(s) Oral daily  senna Syrup 5 milliLiter(s) Oral daily  sodium chloride 3%  Inhalation 4 milliLiter(s) Inhalation every 6 hours    MEDICATIONS  (PRN):  fentaNYL    Injectable 25 MICROGram(s) IV Push every 6 hours PRN Severe Pain (7 - 10)  oxyCODONE    IR 5 milliGRAM(s) Oral every 6 hours PRN Moderate Pain (4 - 6)  sodium chloride 0.9% lock flush 10 milliLiter(s) IV Push every 1 hour PRN Pre/post blood products, medications, blood draw, and to maintain line patency      LABS:                        7.1    22.89 )-----------( 52       ( 09 Sep 2021 02:30 )             20.7     Hgb Trend: 7.1<--, 7.2<--, 7.5<--, 7.8<--, 9.2<--  09-09    146<H>  |  112<H>  |  159<H>  ----------------------------<  156<H>  3.7   |  21<L>  |  4.11<H>    Ca    8.7      09 Sep 2021 02:30  Phos  5.0     09-09  Mg     2.9     09-09    TPro  5.0<L>  /  Alb  1.6<L>  /  TBili  5.4<H>  /  DBili  x   /  AST  195<H>  /  ALT  80<H>  /  AlkPhos  808<H>  09-08        Arterial Blood Gas:  09-08 @ 14:44  --/31/85/20/97/-4.0  ABG lactate: --        MICROBIOLOGY:     RADIOLOGY:  [ ] Reviewed and interpreted by me CHIEF COMPLAINT:    Interval Events:  this morning pt noted to be bleeding from nose - overnight he had pulled out NGT and attempts made to replace unsuccesfully - pt appeared to be gurgling and concern that he was aspirating blood - decision made to intubate patient for airway protection and correct any coagulopathy - noted to have large clots in airways but no blood actively coming from trachea     REVIEW OF SYSTEMS:  [ x] Unable to assess ROS because initially confused then intubated/sedated    SUBJECTIVE:  ICU Vital Signs Last 24 Hrs  T(C): 36.1 (09 Sep 2021 05:05), Max: 36.4 (08 Sep 2021 19:59)  T(F): 97 (09 Sep 2021 05:05), Max: 97.5 (08 Sep 2021 19:59)  HR: 90 (09 Sep 2021 07:15) (88 - 122)  BP: 181/81 (09 Sep 2021 07:00) (113/62 - 196/113)  BP(mean): 108 (09 Sep 2021 07:00) (75 - 137)  ABP: --  ABP(mean): --  RR: 21 (09 Sep 2021 07:15) (16 - 27)  SpO2: 93% (09 Sep 2021 07:15) (93% - 100%)        09-08 @ 07:01  -  09-09 @ 07:00  --------------------------------------------------------  IN: 2252 mL / OUT: 1015 mL / NET: 1237 mL      CAPILLARY BLOOD GLUCOSE      POCT Blood Glucose.: 178 mg/dL (09 Sep 2021 06:19)      PHYSICAL EXAM:  General: NAD, chronically ill appearing  HEENT: dry MM w/ blood noted around nares and in mouth   Neck: supple  Respiratory: diffuse rhonchi  Cardiovascular: s1s2 RRR  Abdomen: soft, non tender, non distended  Extremities: warm, no edema or clubbing  Skin: some ecchymoses on upper extremities; dressing over right thigh appears with dried blood  Neurological: moves all extremities  Psychiatry: speaks Latvian    LINES:  J TLC 9/2    HOSPITAL MEDICATIONS:  MEDICATIONS  (STANDING):  chlorhexidine 2% Cloths 1 Application(s) Topical <User Schedule>  dextrose 40% Gel 15 Gram(s) Oral once  dextrose 5%. 1000 milliLiter(s) (50 mL/Hr) IV Continuous <Continuous>  dextrose 5%. 1000 milliLiter(s) (100 mL/Hr) IV Continuous <Continuous>  dextrose 50% Injectable 25 Gram(s) IV Push once  dextrose 50% Injectable 12.5 Gram(s) IV Push once  dextrose 50% Injectable 25 Gram(s) IV Push once  furosemide   Injectable 40 milliGRAM(s) IV Push once  glucagon  Injectable 1 milliGRAM(s) IntraMuscular once  hydrocortisone sodium succinate Injectable 25 milliGRAM(s) IV Push every 8 hours  insulin lispro (ADMELOG) corrective regimen sliding scale   SubCutaneous every 6 hours  pantoprazole  Injectable 40 milliGRAM(s) IV Push daily  piperacillin/tazobactam IVPB.. 3.375 Gram(s) IV Intermittent every 12 hours  polyethylene glycol 3350 17 Gram(s) Oral daily  senna Syrup 5 milliLiter(s) Oral daily  sodium chloride 3%  Inhalation 4 milliLiter(s) Inhalation every 6 hours    MEDICATIONS  (PRN):  fentaNYL    Injectable 25 MICROGram(s) IV Push every 6 hours PRN Severe Pain (7 - 10)  oxyCODONE    IR 5 milliGRAM(s) Oral every 6 hours PRN Moderate Pain (4 - 6)  sodium chloride 0.9% lock flush 10 milliLiter(s) IV Push every 1 hour PRN Pre/post blood products, medications, blood draw, and to maintain line patency      LABS:                        7.1    22.89 )-----------( 52       ( 09 Sep 2021 02:30 )             20.7     Hgb Trend: 7.1<--, 7.2<--, 7.5<--, 7.8<--, 9.2<--  09-09    146<H>  |  112<H>  |  159<H>  ----------------------------<  156<H>  3.7   |  21<L>  |  4.11<H>    Ca    8.7      09 Sep 2021 02:30  Phos  5.0     09-09  Mg     2.9     09-09    TPro  5.0<L>  /  Alb  1.6<L>  /  TBili  5.4<H>  /  DBili  x   /  AST  195<H>  /  ALT  80<H>  /  AlkPhos  808<H>  09-08        Arterial Blood Gas:  09-08 @ 14:44  --/31/85/20/97/-4.0  ABG lactate: --        MICROBIOLOGY:     RADIOLOGY:  [ ] Reviewed and interpreted by me

## 2021-09-10 NOTE — PROGRESS NOTE ADULT - SUBJECTIVE AND OBJECTIVE BOX
Orthopedics      Patient seen and examined at bedside. Intubated/sedated    Vital Signs Last 24 Hrs  T(C): 36.7 (09-10-21 @ 04:00), Max: 36.7 (09-10-21 @ 04:00)  T(F): 98 (09-10-21 @ 04:00), Max: 98 (09-10-21 @ 04:00)  HR: 81 (09-10-21 @ 05:22) (78 - 112)  BP: 157/63 (09-10-21 @ 04:00) (72/34 - 204/90)  BP(mean): 91 (09-10-21 @ 04:00) (45 - 122)  RR: 17 (09-10-21 @ 04:00) (16 - 33)  SpO2: 100% (09-10-21 @ 05:22) (89% - 100%)                        7.6    21.45 )-----------( 74       ( 10 Sep 2021 04:17 )             22.2     10 Sep 2021 04:17    143    |  109    |  122    ----------------------------<  175    3.6     |  25     |  3.38     Ca    8.2        10 Sep 2021 04:17  Phos  5.0       10 Sep 2021 04:17  Mg     2.7       10 Sep 2021 04:17    TPro  4.9    /  Alb  1.6    /  TBili  4.4    /  DBili  x      /  AST  203    /  ALT  95     /  AlkPhos  820    10 Sep 2021 04:17    PT/INR - ( 09 Sep 2021 12:42 )   PT: 13.2 sec;   INR: 1.15 ratio         PTT - ( 09 Sep 2021 12:42 )  PTT:27.5 sec    Exam:  Gen: Inbubated/sedated  RLE:  Dressing c/d/i  Compartments soft and compressible  2+ Pulses palpable      A/P: 81yM POD 9 s/p R IMN  -FU AM labs  -Pain control  -WBAT  -DVT ppx  -Incentive Spirometry  -Elevation to extremity  -Excellent ICU management appreciated  -No acute orthopaedic surgical intervention at this time, please re-consult as needed  -Ortho Stable for DC  -Case discussed with attending who agrees with above plan

## 2021-09-10 NOTE — PROGRESS NOTE ADULT - SUBJECTIVE AND OBJECTIVE BOX
CHIEF COMPLAINT:    Interval Events:    REVIEW OF SYSTEMS:  Constitutional: [ ] fevers [ ] chills [ ] weight loss [ ] weight gain  HEENT: [ ] dry eyes [ ] eye irritation [ ] postnasal drip [ ] nasal congestion  CV: [ ] chest pain [ ] orthopnea [ ] palpitations [ ] murmur  Resp: [ ] cough [ ] shortness of breath [ ] dyspnea [ ] wheezing [ ] sputum [ ] hemoptysis  GI: [ ] nausea [ ] vomiting [ ] diarrhea [ ] constipation [ ] abd pain [ ] dysphagia   : [ ] dysuria [ ] nocturia [ ] hematuria [ ] increased urinary frequency  Musculoskeletal: [ ] back pain [ ] myalgias [ ] arthralgias [ ] fracture  Skin: [ ] rash [ ] itch  Neurological: [ ] headache [ ] dizziness [ ] syncope [ ] weakness [ ] numbness  Hematologic/Lymphatic: [ ] anemia [ ] bleeding problem  Allergic/Immunologic: [ ] itchy eyes [ ] nasal discharge [ ] hives [ ] angioedema  [ ] All other systems negative  [ ] Unable to assess ROS because ________    OBJECTIVE:  ICU Vital Signs Last 24 Hrs  T(C): 36.7 (10 Sep 2021 04:00), Max: 36.7 (10 Sep 2021 04:00)  T(F): 98 (10 Sep 2021 04:00), Max: 98 (10 Sep 2021 04:00)  HR: 79 (10 Sep 2021 07:00) (78 - 112)  BP: 157/62 (10 Sep 2021 07:00) (72/34 - 204/90)  BP(mean): 89 (10 Sep 2021 07:00) (45 - 122)  ABP: --  ABP(mean): --  RR: 19 (10 Sep 2021 07:00) (16 - 22)  SpO2: 100% (10 Sep 2021 07:00) (94% - 100%)    Mode: AC/ CMV (Assist Control/ Continuous Mandatory Ventilation), RR (machine): 18, TV (machine): 500, FiO2: 40, PEEP: 5, ITime: 1, MAP: 9, PIP: 23    09-09 @ 07:01  -  09-10 @ 07:00  --------------------------------------------------------  IN: 761.8 mL / OUT: 865 mL / NET: -103.2 mL      CAPILLARY BLOOD GLUCOSE      POCT Blood Glucose.: 189 mg/dL (10 Sep 2021 05:51)      PHYSICAL EXAM:  General:   HEENT:   Neck:   Respiratory:   Cardiovascular:   Abdomen:   Extremities:   Skin:   Neurological:  Psychiatry:    LINES:    HOSPITAL MEDICATIONS:  MEDICATIONS  (STANDING):  chlorhexidine 0.12% Liquid 15 milliLiter(s) Oral Mucosa every 12 hours  chlorhexidine 2% Cloths 1 Application(s) Topical <User Schedule>  chlorhexidine 4% Liquid 1 Application(s) Topical <User Schedule>  dextrose 40% Gel 15 Gram(s) Oral once  dextrose 5%. 1000 milliLiter(s) (100 mL/Hr) IV Continuous <Continuous>  dextrose 5%. 1000 milliLiter(s) (50 mL/Hr) IV Continuous <Continuous>  dextrose 50% Injectable 25 Gram(s) IV Push once  dextrose 50% Injectable 12.5 Gram(s) IV Push once  dextrose 50% Injectable 25 Gram(s) IV Push once  glucagon  Injectable 1 milliGRAM(s) IntraMuscular once  hydrocortisone sodium succinate Injectable 25 milliGRAM(s) IV Push every 8 hours  insulin lispro (ADMELOG) corrective regimen sliding scale   SubCutaneous every 6 hours  norepinephrine Infusion 0.05 MICROgram(s)/kG/Min (3.03 mL/Hr) IV Continuous <Continuous>  pantoprazole  Injectable 40 milliGRAM(s) IV Push every 12 hours  polyethylene glycol 3350 17 Gram(s) Oral daily  propofol Infusion 10 MICROgram(s)/kG/Min (3.88 mL/Hr) IV Continuous <Continuous>  senna Syrup 5 milliLiter(s) Oral daily    MEDICATIONS  (PRN):  oxyCODONE    IR 5 milliGRAM(s) Oral every 6 hours PRN Moderate Pain (4 - 6)  sodium chloride 0.9% lock flush 10 milliLiter(s) IV Push every 1 hour PRN Pre/post blood products, medications, blood draw, and to maintain line patency      LABS:                        7.6    21.45 )-----------( 74       ( 10 Sep 2021 04:17 )             22.2     Hgb Trend: 7.6<--, 8.1<--, 8.2<--, 7.1<--, 7.2<--  09-10    143  |  109<H>  |  122<H>  ----------------------------<  175<H>  3.6   |  25  |  3.38<H>    Ca    8.2<L>      10 Sep 2021 04:17  Phos  5.0     09-10  Mg     2.7     09-10    TPro  4.9<L>  /  Alb  1.6<L>  /  TBili  4.4<H>  /  DBili  x   /  AST  203<H>  /  ALT  95<H>  /  AlkPhos  820<H>  09-10    PT/INR - ( 09 Sep 2021 12:42 )   PT: 13.2 sec;   INR: 1.15 ratio         PTT - ( 09 Sep 2021 12:42 )  PTT:27.5 sec    Arterial Blood Gas:  09-10 @ 00:25  --/38/<42/23/53/-1.3  ABG lactate: --  Arterial Blood Gas:  09-08 @ 14:44  --/31/85/20/97/-4.0  ABG lactate: --        MICROBIOLOGY:     RADIOLOGY:  [ ] Reviewed and interpreted by me CHIEF COMPLAINT:    Interval Events:  no overnight events  no further bleeding    REVIEW OF SYSTEMS:  [x ] Unable to assess ROS because intubated/sedated    OBJECTIVE:  ICU Vital Signs Last 24 Hrs  T(C): 36.7 (10 Sep 2021 04:00), Max: 36.7 (10 Sep 2021 04:00)  T(F): 98 (10 Sep 2021 04:00), Max: 98 (10 Sep 2021 04:00)  HR: 79 (10 Sep 2021 07:00) (78 - 112)  BP: 157/62 (10 Sep 2021 07:00) (72/34 - 204/90)  BP(mean): 89 (10 Sep 2021 07:00) (45 - 122)  ABP: --  ABP(mean): --  RR: 19 (10 Sep 2021 07:00) (16 - 22)  SpO2: 100% (10 Sep 2021 07:00) (94% - 100%)    Mode: AC/ CMV (Assist Control/ Continuous Mandatory Ventilation), RR (machine): 18, TV (machine): 500, FiO2: 40, PEEP: 5, ITime: 1, MAP: 9, PIP: 23    09-09 @ 07:01  -  09-10 @ 07:00  --------------------------------------------------------  IN: 761.8 mL / OUT: 865 mL / NET: -103.2 mL      CAPILLARY BLOOD GLUCOSE      POCT Blood Glucose.: 189 mg/dL (10 Sep 2021 05:51)      PHYSICAL EXAM:  General: NAD, chronically ill appearing  HEENT: ETT and OGT in place   Neck: supple  Respiratory: CTA b/l  Cardiovascular: s1s2 RRR  Abdomen: soft, non tender, non distended  Extremities: warm, no edema or clubbing  Skin: some ecchymoses on upper extremities; dressing over right thigh appears with dried blood  Neurological: unable to assess    LINES:  [facility] TLC 9/2  [facility] HD catheter 9/9    HOSPITAL MEDICATIONS:  MEDICATIONS  (STANDING):  chlorhexidine 0.12% Liquid 15 milliLiter(s) Oral Mucosa every 12 hours  chlorhexidine 2% Cloths 1 Application(s) Topical <User Schedule>  chlorhexidine 4% Liquid 1 Application(s) Topical <User Schedule>  dextrose 40% Gel 15 Gram(s) Oral once  dextrose 5%. 1000 milliLiter(s) (100 mL/Hr) IV Continuous <Continuous>  dextrose 5%. 1000 milliLiter(s) (50 mL/Hr) IV Continuous <Continuous>  dextrose 50% Injectable 25 Gram(s) IV Push once  dextrose 50% Injectable 12.5 Gram(s) IV Push once  dextrose 50% Injectable 25 Gram(s) IV Push once  glucagon  Injectable 1 milliGRAM(s) IntraMuscular once  hydrocortisone sodium succinate Injectable 25 milliGRAM(s) IV Push every 8 hours  insulin lispro (ADMELOG) corrective regimen sliding scale   SubCutaneous every 6 hours  norepinephrine Infusion 0.05 MICROgram(s)/kG/Min (3.03 mL/Hr) IV Continuous <Continuous>  pantoprazole  Injectable 40 milliGRAM(s) IV Push every 12 hours  polyethylene glycol 3350 17 Gram(s) Oral daily  propofol Infusion 10 MICROgram(s)/kG/Min (3.88 mL/Hr) IV Continuous <Continuous>  senna Syrup 5 milliLiter(s) Oral daily    MEDICATIONS  (PRN):  oxyCODONE    IR 5 milliGRAM(s) Oral every 6 hours PRN Moderate Pain (4 - 6)  sodium chloride 0.9% lock flush 10 milliLiter(s) IV Push every 1 hour PRN Pre/post blood products, medications, blood draw, and to maintain line patency      LABS:                        7.6    21.45 )-----------( 74       ( 10 Sep 2021 04:17 )             22.2     Hgb Trend: 7.6<--, 8.1<--, 8.2<--, 7.1<--, 7.2<--  09-10    143  |  109<H>  |  122<H>  ----------------------------<  175<H>  3.6   |  25  |  3.38<H>    Ca    8.2<L>      10 Sep 2021 04:17  Phos  5.0     09-10  Mg     2.7     09-10    TPro  4.9<L>  /  Alb  1.6<L>  /  TBili  4.4<H>  /  DBili  x   /  AST  203<H>  /  ALT  95<H>  /  AlkPhos  820<H>  09-10    PT/INR - ( 09 Sep 2021 12:42 )   PT: 13.2 sec;   INR: 1.15 ratio         PTT - ( 09 Sep 2021 12:42 )  PTT:27.5 sec    Arterial Blood Gas:  09-10 @ 00:25  --/38/<42/23/53/-1.3  ABG lactate: --  Arterial Blood Gas:  09-08 @ 14:44  --/31/85/20/97/-4.0  ABG lactate: --        MICROBIOLOGY:     RADIOLOGY:  [ ] Reviewed and interpreted by me

## 2021-09-10 NOTE — DIETITIAN NUTRITION RISK NOTIFICATION - TREATMENT: THE FOLLOWING DIET HAS BEEN RECOMMENDED
Diet, NPO with Tube Feed:   Tube Feeding Modality: Orogastric  Vital AF  Total Volume for 24 Hours (mL): 1320  Continuous  Starting Tube Feed Rate {mL per Hour}: 25  Increase Tube Feed Rate by (mL): 10     Every 8 hours  Until Goal Tube Feed Rate (mL per Hour): 55  Tube Feed Duration (in Hours): 24  Tube Feed Start Time: 14:30  Volume Based Feeding Titration:  If the patient has achieved and tolerated the prescribed goal rate and tube feeding has been held within a 24hr period, titrate tube feeding rate based on guidelines, up to a maximum rate of 120mL/hr (09-08-21 @ 19:48) [Active]  Diet, Regular (09-01-21 @ 22:40) [Hold]  Diet, Clear Liquid (09-01-21 @ 22:40) [Hold]

## 2021-09-10 NOTE — PROGRESS NOTE ADULT - ASSESSMENT
81M w/ orthostatic hypotension (baseline BP90/40 on midrodrine), CAD s/p CABG, HLD< COPD, CKD, glaucoma, dementia. Admitted s/p fall found to have displaced R femoral intratrochanteric fracture w/ hematoma. S/P OR on 9/1 for IMN - post-op pt was hypotensive, hypothermic w/ AMS. Transferred to ICU for further care. Pt was intubated for worsening shock state and metabolic acidosis. Pt eventually extubated on 9/7. Course also complicated by LAUREN on CKD, thrombocytopenia and pseudomonas pna. This morning pt bleeding from nasopharynx so re-intubated for airway protection and coagulopathy corrected. Noted to have rising BUN which may also be contributing to bleeding.     #Neuro - propofol turned off for sedation vacation and possible extubation; pain control as needed when awake  #CV - HD stable, BP Has been rising so midodrine stopped and hydrocortisone tapered; monitor for now  #Pulm - re-intubated yesterday for bleeding from what appears nasopharynx; PSV today and may attempt to extubate since no more bleeding noted e  #ID- s/p zosyn for for pseudomonas in sputum - continue to monitor abx  #Renal/metabolic - LAUREN on CKD likely prerenal ATN; received HD x1 yesterday for uremic bleeding, plan for repeat HD session today; making urine otherwise  #GI- no further bleeding from OGT - likely residual from bleeding from nares; tolerating TF; LFTs remain elevated but stable - continue to trend  #Heme - pt continued to ooze from surgical site, ortho aware; and had episode of bleeding from nose yesterday - s/p 1 pRBC, 1 plts and DDAVP - no further bleeding noted  #Endo - hydrosortisone tapering down, FS acceptable  #Skin - dressing of R hip IMN as per ortho  #PPx - SCDs in setting of continued bleeding and thrombocytopenia  #Dispo- prognosis very guarded; remains in ICU intubated; full code for now

## 2021-09-10 NOTE — CHART NOTE - NSCHARTNOTEFT_GEN_A_CORE
Assessment:  Pt seen in ICU, on vent, on OGT feeding.  Pt c right hip fracture, s/p right IMN, acute respiratory failure, extubated, reintubated 9/9 for airway protection, shock, septic v hemorrhagic resolved, acute on chronic hypotension, pseudomonas pneumonia, acute blood loss anemia, thrombocytopenia, transaminitis, LAUREN on CKD; on HD, leukocytosis, COPD,       Factors impacting intake: [ ] none [ ] nausea  [ ] vomiting [ ] diarrhea [ ] constipation  [ ]chewing problems [ ] swallowing issues  [x ] other: OGT feeding     Diet Prescription: Diet, NPO with Tube Feed:   Tube Feeding Modality: Orogastric  Vital AF  Total Volume for 24 Hours (mL): 1320  Continuous  Starting Tube Feed Rate {mL per Hour}: 25  Increase Tube Feed Rate by (mL): 10     Every 8 hours  Until Goal Tube Feed Rate (mL per Hour): 55(Vital AF 1.2 @ 55 ml/hr= 1320 ml, 1584 calories, 99 grams protein, 1070 ml free water, 111% RDIs)  Tube Feed Duration (in Hours): 24  Tube Feed Start Time: 14:30  Volume Based Feeding Titration:  If the patient has achieved and tolerated the prescribed goal rate and tube feeding has been held within a 24hr period, titrate tube feeding rate based on guidelines, up to a maximum rate of 120mL/hr (09-08-21 @ 19:48)  Pt was previously on enteral feeding c Vital AF 1.2 on 09/05->09/07.     Intake: OGT feeding c Vital AF 1.2 infusing @ 30 ml/cm=596 ml, 864 calories, 54 grams protein, 584 ml free water, 60% RDIs  + ~ 183 calories from propofol(09/09).    Current Weight: 09/10, 71.3 kg, 08/31, 64.7 kg, c wt. gain of 6.6 kg   % Weight Change: 10.2%  09/10, 2+ generalized edema, 3+ edema of right leg noted   I/O: 777/880(-103)/indwelling urine catheter: 865 ml    Pertinent Medications: MEDICATIONS  (STANDING):  chlorhexidine 0.12% Liquid 15 milliLiter(s) Oral Mucosa every 12 hours  chlorhexidine 2% Cloths 1 Application(s) Topical <User Schedule>  dextrose 40% Gel 15 Gram(s) Oral once  dextrose 5%. 1000 milliLiter(s) (50 mL/Hr) IV Continuous <Continuous>  dextrose 5%. 1000 milliLiter(s) (100 mL/Hr) IV Continuous <Continuous>  dextrose 50% Injectable 25 Gram(s) IV Push once  dextrose 50% Injectable 12.5 Gram(s) IV Push once  dextrose 50% Injectable 25 Gram(s) IV Push once  glucagon  Injectable 1 milliGRAM(s) IntraMuscular once  hydrocortisone sodium succinate Injectable 25 milliGRAM(s) IV Push every 8 hours  insulin lispro (ADMELOG) corrective regimen sliding scale   SubCutaneous every 6 hours  norepinephrine Infusion 0.05 MICROgram(s)/kG/Min (3.03 mL/Hr) IV Continuous <Continuous>  pantoprazole  Injectable 40 milliGRAM(s) IV Push every 12 hours  polyethylene glycol 3350 17 Gram(s) Oral daily  propofol Infusion 10 MICROgram(s)/kG/Min (3.88 mL/Hr) IV Continuous <Continuous>=166 ml(09/09)= 182.6 calories   senna Syrup 5 milliLiter(s) Oral daily    MEDICATIONS  (PRN):  oxyCODONE    IR 5 milliGRAM(s) Oral every 6 hours PRN Moderate Pain (4 - 6)  sodium chloride 0.9% lock flush 10 milliLiter(s) IV Push every 1 hour PRN Pre/post blood products, medications, blood draw, and to maintain line patency    Pertinent Labs: 09-10 Na143 mmol/L Glu 175 mg/dL<H> K+ 3.6 mmol/L Cr  3.38 mg/dL<H>  mg/dL<H> 09-10 Phos 5.0 mg/dL<H> 09-10 Alb 1.6 g/dL<L>09-10 ALT 95 U/L<H>  U/L<H> Alkaline Phosphatase 820 U/L<H>  09-08-21 @ 09:34 a1c 5.5   CAPILLARY BLOOD GLUCOSE      POCT Blood Glucose.: 270 mg/dL (10 Sep 2021 11:32)  POCT Blood Glucose.: 189 mg/dL (10 Sep 2021 05:51)  POCT Blood Glucose.: 172 mg/dL (10 Sep 2021 00:43)  POCT Blood Glucose.: 141 mg/dL (09 Sep 2021 17:09)    Skin: WDL except ecchymotic, surgical incision    Estimated Needs:   [ ] no change since previous assessment  [x ] recalculated: for HD  IBW: 67.1 kg   Estimated Energy needs: 7617-2572 calories (25-30 Kcal/kg IBW)  Estimated Protein needs: 81-87 grams protein(1.2-1.3 gram protein/kg IBW)  Estimated Fluid needs: 0924-0876 ml (20-25 ml/kg IBW)       Previous Nutrition Diagnosis:   Other inadequate energy intake.     Etiology acute illness, severe shock, respiratory failure, LAUREN.     Signs/Symptoms NPO x 1 day.     Goal/Expected Outcome pt to meet >50-75% nutrition needs via tolerated route; not consistently met     Nutrition Diagnosis is [x ] ongoing  [ ] resolved [ ] not applicable       New Nutrition Diagnosis: [ ] not applicable     Interventions:   Recommend  [ ] Change Diet To:  [ ] Nutrition Supplement  [ x] Nutrition Support; if pt remains on vent, recommend Nepro @ 30 mL/hr increase to goal rate 40 mL/hr x 24 hrs (1728 nadja, 78 gm pro, 701 mL free water, 101% RDIs)   [ ] Other:     Monitoring and Evaluation:   [ ] PO intake [ x ] Tolerance to diet prescription [ x ] weights [ x ] labs[ x ] follow up per protocol  [ ] other: Assessment:  Pt seen in ICU, on vent, on OGT feeding.  Pt c right hip fracture, s/p right IMN, acute respiratory failure, extubated, reintubated 9/9 for airway protection, shock, septic v hemorrhagic resolved, acute on chronic hypotension, pseudomonas pneumonia, acute blood loss anemia, thrombocytopenia, transaminitis, LAUREN on CKD; on HD, leukocytosis, COPD,       Factors impacting intake: [ ] none [ ] nausea  [ ] vomiting [ ] diarrhea [ ] constipation  [ ]chewing problems [ ] swallowing issues  [x ] other: OGT feeding     Diet Prescription: Diet, NPO with Tube Feed:   Tube Feeding Modality: Orogastric  Vital AF  Total Volume for 24 Hours (mL): 1320  Continuous  Starting Tube Feed Rate {mL per Hour}: 25  Increase Tube Feed Rate by (mL): 10     Every 8 hours  Until Goal Tube Feed Rate (mL per Hour): 55(Vital AF 1.2 @ 55 ml/hr= 1320 ml, 1584 calories, 99 grams protein, 1070 ml free water, 111% RDIs)  Tube Feed Duration (in Hours): 24  Tube Feed Start Time: 14:30  Volume Based Feeding Titration:  If the patient has achieved and tolerated the prescribed goal rate and tube feeding has been held within a 24hr period, titrate tube feeding rate based on guidelines, up to a maximum rate of 120mL/hr (09-08-21 @ 19:48)  Pt was previously on enteral feeding c Vital AF 1.2 on 09/05->09/07.  <50% nutrition needs >5 days since adm noted.    Intake: OGT feeding c Vital AF 1.2 infusing @ 30 ml/ci=256 ml, 864 calories, 54 grams protein, 584 ml free water, 60% RDIs  + ~ 183 calories from propofol(09/09).    Current Weight: 09/10, 71.3 kg, 08/31, 64.7 kg, c wt. gain of 6.6 kg   % Weight Change: 10.2%  09/10, 2+ generalized edema, 3+ edema of right leg noted   I/O: 777/880(-103)/indwelling urine catheter: 865 ml    Nutrition focused physical exam conducted; limited exam due to vent status, pt receiving HD. Subcutaneous fat Exam;  [ Moderate  ]  Orbital fat pads region,  [ Unable  ]Buccal fat region,  [ Unable  ]triceps region, [ Unable  ]ribs region.  Muscle Exam; [ Moderate  ]temples region, [ Unable  ]clavicle region, [ Moderate  ]shoulder region, [ Unable   ]Scapula region, [ Unable  ]Interosseous region, [  Unable ]thigh region, [  Unable ]Calf region    Pertinent Medications: MEDICATIONS  (STANDING):  chlorhexidine 0.12% Liquid 15 milliLiter(s) Oral Mucosa every 12 hours  chlorhexidine 2% Cloths 1 Application(s) Topical <User Schedule>  dextrose 40% Gel 15 Gram(s) Oral once  dextrose 5%. 1000 milliLiter(s) (50 mL/Hr) IV Continuous <Continuous>  dextrose 5%. 1000 milliLiter(s) (100 mL/Hr) IV Continuous <Continuous>  dextrose 50% Injectable 25 Gram(s) IV Push once  dextrose 50% Injectable 12.5 Gram(s) IV Push once  dextrose 50% Injectable 25 Gram(s) IV Push once  glucagon  Injectable 1 milliGRAM(s) IntraMuscular once  hydrocortisone sodium succinate Injectable 25 milliGRAM(s) IV Push every 8 hours  insulin lispro (ADMELOG) corrective regimen sliding scale   SubCutaneous every 6 hours  norepinephrine Infusion 0.05 MICROgram(s)/kG/Min (3.03 mL/Hr) IV Continuous <Continuous>  pantoprazole  Injectable 40 milliGRAM(s) IV Push every 12 hours  polyethylene glycol 3350 17 Gram(s) Oral daily  propofol Infusion 10 MICROgram(s)/kG/Min (3.88 mL/Hr) IV Continuous <Continuous>=166 ml(09/09)= 182.6 calories   senna Syrup 5 milliLiter(s) Oral daily    MEDICATIONS  (PRN):  oxyCODONE    IR 5 milliGRAM(s) Oral every 6 hours PRN Moderate Pain (4 - 6)  sodium chloride 0.9% lock flush 10 milliLiter(s) IV Push every 1 hour PRN Pre/post blood products, medications, blood draw, and to maintain line patency    Pertinent Labs: 09-10 Na143 mmol/L Glu 175 mg/dL<H> K+ 3.6 mmol/L Cr  3.38 mg/dL<H>  mg/dL<H> 09-10 Phos 5.0 mg/dL<H> 09-10 Alb 1.6 g/dL<L>09-10 ALT 95 U/L<H>  U/L<H> Alkaline Phosphatase 820 U/L<H>  09-08-21 @ 09:34 a1c 5.5   CAPILLARY BLOOD GLUCOSE      POCT Blood Glucose.: 270 mg/dL (10 Sep 2021 11:32)  POCT Blood Glucose.: 189 mg/dL (10 Sep 2021 05:51)  POCT Blood Glucose.: 172 mg/dL (10 Sep 2021 00:43)  POCT Blood Glucose.: 141 mg/dL (09 Sep 2021 17:09)    Skin: WDL except ecchymotic, surgical incision    Estimated Needs:   [ ] no change since previous assessment  [x ] recalculated: for HD  IBW: 67.1 kg   Estimated Energy needs: 7554-5497 calories (25-30 Kcal/kg IBW)  Estimated Protein needs: 81-87 grams protein(1.2-1.3 gram protein/kg IBW)  Estimated Fluid needs: 0749-8725 ml (20-25 ml/kg IBW)       Previous Nutrition Diagnosis:   Other inadequate energy intake.     Etiology acute illness, severe shock, respiratory failure, LAUREN.     Signs/Symptoms NPO x 1 day.     Goal/Expected Outcome pt to meet >50-75% nutrition needs via tolerated route; not consistently met     Nutrition Diagnosis is [x ] ongoing (see new related diagnosis below) [ ] resolved [ ] not applicable     New Nutrition Diagnosis:   [x ] Malnutrition; severe malnutrition in context of acute illness     Related to: inadequate protein-energy intake in setting of right hip fracture, shock, LAUREN on CKD on HD    As evidenced by: <50% nutrition needs > 5 days, physical findings of moderate muscle/fat loss     Goal: pt to meet >75% protein-energy needs via tolerated route     Interventions:   Recommend  [ ] Change Diet To:  [ ] Nutrition Supplement  [ x] Nutrition Support; if pt remains on vent, recommend Nepro @ 30 ml/hr -> 35 ml/tu=015 ml, 1512 calories, 68 grams protein, 610 ml free water, 89% RDIs  +   Liquid protein supplement 1 pkg via OGT=15 grams protein, 100 calories   +additional calories from propofol noted.   [ ] Other:     Monitoring and Evaluation:   [ ] PO intake [ x ] Tolerance to diet prescription [ x ] weights [ x ] labs; glucose, Triglyceride, renal indices [ x ] follow up per protocol  [ ] other: Assessment:  Pt seen in ICU, on vent, on OGT feeding.  Pt c right hip fracture, s/p right IMN, acute respiratory failure, extubated, reintubated 9/9 for airway protection, shock, septic vs hemorrhagic resolved, acute on chronic hypotension, pseudomonas pneumonia, acute blood loss anemia, thrombocytopenia, transaminitis, LAUREN on CKD; on HD, leukocytosis, COPD.      Factors impacting intake: [ ] none [ ] nausea  [ ] vomiting [ ] diarrhea [ ] constipation  [ ]chewing problems [ ] swallowing issues  [x ] other: OGT feeding     Diet Prescription: Diet, NPO with Tube Feed:   Tube Feeding Modality: Orogastric  Vital AF  Total Volume for 24 Hours (mL): 1320  Continuous  Starting Tube Feed Rate {mL per Hour}: 25  Increase Tube Feed Rate by (mL): 10     Every 8 hours  Until Goal Tube Feed Rate (mL per Hour): 55(Vital AF 1.2 @ 55 ml/hr= 1320 ml, 1584 calories, 99 grams protein, 1070 ml free water, 111% RDIs)  Tube Feed Duration (in Hours): 24  Tube Feed Start Time: 14:30  Volume Based Feeding Titration:  If the patient has achieved and tolerated the prescribed goal rate and tube feeding has been held within a 24hr period, titrate tube feeding rate based on guidelines, up to a maximum rate of 120mL/hr (09-08-21 @ 19:48)  Pt was previously on enteral feeding c Vital AF 1.2 on 09/05->09/07.  <50% nutrition needs >5 days since adm noted.    Intake: OGT feeding c Vital AF 1.2 infusing @ 30 ml/ls=310 ml, 864 calories, 54 grams protein, 584 ml free water, 60% RDIs  + ~ 183 calories from propofol(09/09).    Current Weight: 09/10, 71.3 kg, 08/31, 64.7 kg, c wt. gain of 6.6 kg   % Weight Change: 10.2%  09/10, 2+ generalized edema, 3+ edema of right leg noted   I/O: 777/880(-103)/indwelling urine catheter: 865 ml    Nutrition focused physical exam conducted; limited exam due to vent status, pt receiving HD. Subcutaneous fat Exam;  [ Moderate  ]  Orbital fat pads region,  [ Unable  ]Buccal fat region,  [ Unable  ]triceps region, [ Unable  ]ribs region.  Muscle Exam; [ Moderate  ]temples region, [ Unable  ]clavicle region, [ Moderate  ]shoulder region, [ Unable   ]Scapula region, [ Unable  ]Interosseous region, [  Unable ]thigh region, [  Unable ]Calf region    Pertinent Medications: MEDICATIONS  (STANDING):  chlorhexidine 0.12% Liquid 15 milliLiter(s) Oral Mucosa every 12 hours  chlorhexidine 2% Cloths 1 Application(s) Topical <User Schedule>  dextrose 40% Gel 15 Gram(s) Oral once  dextrose 5%. 1000 milliLiter(s) (50 mL/Hr) IV Continuous <Continuous>  dextrose 5%. 1000 milliLiter(s) (100 mL/Hr) IV Continuous <Continuous>  dextrose 50% Injectable 25 Gram(s) IV Push once  dextrose 50% Injectable 12.5 Gram(s) IV Push once  dextrose 50% Injectable 25 Gram(s) IV Push once  glucagon  Injectable 1 milliGRAM(s) IntraMuscular once  hydrocortisone sodium succinate Injectable 25 milliGRAM(s) IV Push every 8 hours  insulin lispro (ADMELOG) corrective regimen sliding scale   SubCutaneous every 6 hours  norepinephrine Infusion 0.05 MICROgram(s)/kG/Min (3.03 mL/Hr) IV Continuous <Continuous>  pantoprazole  Injectable 40 milliGRAM(s) IV Push every 12 hours  polyethylene glycol 3350 17 Gram(s) Oral daily  propofol Infusion 10 MICROgram(s)/kG/Min (3.88 mL/Hr) IV Continuous <Continuous>=166 ml(09/09)= 182.6 calories   senna Syrup 5 milliLiter(s) Oral daily    MEDICATIONS  (PRN):  oxyCODONE    IR 5 milliGRAM(s) Oral every 6 hours PRN Moderate Pain (4 - 6)  sodium chloride 0.9% lock flush 10 milliLiter(s) IV Push every 1 hour PRN Pre/post blood products, medications, blood draw, and to maintain line patency    Pertinent Labs: 09-10 Na143 mmol/L Glu 175 mg/dL<H> K+ 3.6 mmol/L Cr  3.38 mg/dL<H>  mg/dL<H> 09-10 Phos 5.0 mg/dL<H> 09-10 Alb 1.6 g/dL<L>09-10 ALT 95 U/L<H>  U/L<H> Alkaline Phosphatase 820 U/L<H>  09-08-21 @ 09:34 a1c 5.5   CAPILLARY BLOOD GLUCOSE      POCT Blood Glucose.: 270 mg/dL (10 Sep 2021 11:32)  POCT Blood Glucose.: 189 mg/dL (10 Sep 2021 05:51)  POCT Blood Glucose.: 172 mg/dL (10 Sep 2021 00:43)  POCT Blood Glucose.: 141 mg/dL (09 Sep 2021 17:09)    Skin: WDL except ecchymotic, surgical incision    Estimated Needs:   [ ] no change since previous assessment  [x ] recalculated: for HD  IBW: 67.1 kg   Estimated Energy needs: 6682-7595 calories (25-30 Kcal/kg IBW)  Estimated Protein needs: 81-87 grams protein(1.2-1.3 gram protein/kg IBW)  Estimated Fluid needs: 4703-7203 ml (20-25 ml/kg IBW)       Previous Nutrition Diagnosis:   Other inadequate energy intake.     Etiology acute illness, severe shock, respiratory failure, LAUREN.     Signs/Symptoms NPO x 1 day.     Goal/Expected Outcome pt to meet >50-75% nutrition needs via tolerated route; not consistently met     Nutrition Diagnosis is [x ] ongoing (see new related diagnosis below) [ ] resolved [ ] not applicable     New Nutrition Diagnosis:   [x ] Malnutrition; severe malnutrition in context of acute illness     Related to: inadequate protein-energy intake in setting of right hip fracture, shock, LAUREN on CKD on HD    As evidenced by: <50% nutrition needs > 5 days, physical findings of moderate muscle/fat loss     Goal: pt to meet >75% protein-energy needs via tolerated route     Interventions:   Recommend  [ ] Change Diet To:  [ ] Nutrition Supplement  [ x] Nutrition Support; if pt remains on vent, recommend Nepro @ 30 ml/hr -> 35 ml/dp=758 ml, 1512 calories, 68 grams protein, 610 ml free water, 89% RDIs  +   Liquid protein supplement 1 pkg via OGT=15 grams protein, 100 calories   +additional calories from propofol noted.   [ ] Other:     Monitoring and Evaluation:   [ ] PO intake [ x ] Tolerance to diet prescription [ x ] weights [ x ] labs; glucose, Triglyceride, renal indices [ x ] follow up per protocol  [ ] other:

## 2021-09-10 NOTE — PROGRESS NOTE ADULT - ASSESSMENT
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 Per daughter pt at baseline, AAOx2 at baseline. Daughter is translating for pt and reports pt got his foot got stuck and fell. Fall Witnessed by grandson. No head-strike or LOC. Pt on ASA 81mg. Denies AC use. Denies fever/chills, cough, CP, SOB, N/V, abdominal pain or headache. Pt unable to ambulate. Pt main complaint is right hip pain       R hip fractures/p R IMN  Management per ortho    Acute respiratory failure  Extubated  Reintubated 9/9 for airway protection    Shock, septic v hemorrhagic, resolved  Acute on Chronic hypotension on midodrine  off pressors and midodrine  On steroids  off empiric antiobitcs  blood and urine cultures ngtd    Pseudomonas PNA  On Zosyn    Acute blood loss anemia  likely from bleeding from fracture,   CT pelvis and RLE reviewed, no hematoma  monitor cbc  transfuse to keep >7    Thrombocytopenia  tranfused plt    Transaminitis  likely due to shock  improving  trend LFTs    Tachycardia, resolved  Likely due to sepsis/shock    LAUREN, CKD4  likely due to rhabdo v shock  nephrology following  HD per neprho    leukocytosis  On hydrocortisone  s/p zosyn for pseumondas pna    Hypokalemia  nephrology following    Hypernatremia  nephrology following  TF with free water    COPD  Not in exacerbation    HLD  on statin     DVT ppx SCDs

## 2021-09-10 NOTE — PROGRESS NOTE ADULT - SUBJECTIVE AND OBJECTIVE BOX
[facility] NEPHROLOGY SERVICES, [facility]  NEPHROLOGY AND HYPERTENSION  [address]  [address]  [place] [address]  [phone]    MD ELSA SANTA MD ANDREY GONCHARUK, MD MADHU KORRAPATI, MD YELENA ROSENBERG, MD BINNY KOSHY, MD CHRISTOPHER CAPUTO, MD EDWARD BOVER, MD          Patient events noted  vent dependent    MEDICATIONS  (STANDING):  chlorhexidine 0.12% Liquid 15 milliLiter(s) Oral Mucosa every 12 hours  chlorhexidine 2% Cloths 1 Application(s) Topical <User Schedule>  dextrose 40% Gel 15 Gram(s) Oral once  dextrose 5%. 1000 milliLiter(s) (50 mL/Hr) IV Continuous <Continuous>  dextrose 5%. 1000 milliLiter(s) (100 mL/Hr) IV Continuous <Continuous>  dextrose 50% Injectable 25 Gram(s) IV Push once  dextrose 50% Injectable 12.5 Gram(s) IV Push once  dextrose 50% Injectable 25 Gram(s) IV Push once  glucagon  Injectable 1 milliGRAM(s) IntraMuscular once  insulin lispro (ADMELOG) corrective regimen sliding scale   SubCutaneous every 6 hours  norepinephrine Infusion 0.05 MICROgram(s)/kG/Min (3.03 mL/Hr) IV Continuous <Continuous>  pantoprazole  Injectable 40 milliGRAM(s) IV Push every 12 hours  polyethylene glycol 3350 17 Gram(s) Oral daily  propofol Infusion 10 MICROgram(s)/kG/Min (3.88 mL/Hr) IV Continuous <Continuous>  senna Syrup 5 milliLiter(s) Oral daily    MEDICATIONS  (PRN):  oxyCODONE    IR 5 milliGRAM(s) Oral every 6 hours PRN Moderate Pain (4 - 6)  sodium chloride 0.9% lock flush 10 milliLiter(s) IV Push every 1 hour PRN Pre/post blood products, medications, blood draw, and to maintain line patency      09-09-21 @ 07:01  -  09-10-21 @ 07:00  --------------------------------------------------------  IN: 761.8 mL / OUT: 865 mL / NET: -103.2 mL    09-10-21 @ 07:01  -  09-10-21 @ 23:07  --------------------------------------------------------  IN: 525.6 mL / OUT: 430 mL / NET: 95.6 mL      PHYSICAL EXAM:      T(C): 36.3 (09-10-21 @ 18:45), Max: 37.1 (09-10-21 @ 07:30)  HR: 91 (09-10-21 @ 22:00) (79 - 96)  BP: 175/89 (09-10-21 @ 22:00) (111/62 - 196/98)  RR: 17 (09-10-21 @ 22:00) (14 - 20)  SpO2: 100% (09-10-21 @ 22:00) (98% - 100%)  Wt(kg): --  Lungs clear  Heart S1S2  Abd soft NT ND  Extremities:   tr edema                                    7.6    21.45 )-----------( 74       ( 10 Sep 2021 04:17 )             22.2     09-10    143  |  109<H>  |  122<H>  ----------------------------<  175<H>  3.6   |  25  |  3.38<H>    Ca    8.2<L>      10 Sep 2021 04:17  Phos  5.0     09-10  Mg     2.7     09-10    TPro  4.9<L>  /  Alb  1.6<L>  /  TBili  4.4<H>  /  DBili  x   /  AST  203<H>  /  ALT  95<H>  /  AlkPhos  820<H>  09-10    ABG - ( 10 Sep 2021 00:25 )  pH, Arterial: x     pH, Blood: 7.39  /  pCO2: 38    /  pO2: <42   / HCO3: 23    / Base Excess: -1.3  /  SaO2: 53                LIVER FUNCTIONS - ( 10 Sep 2021 04:17 )  Alb: 1.6 g/dL / Pro: 4.9 gm/dL / ALK PHOS: 820 U/L / ALT: 95 U/L / AST: 203 U/L / GGT: x           Creatinine Trend: 3.38<--, 4.11<--, 3.71<--, 3.21<--, 3.44<--, 3.55<--  Mode: AC/ CMV (Assist Control/ Continuous Mandatory Ventilation)  RR (machine): 18  TV (machine): 500  FiO2: 40  PEEP: 5  ITime: 0.8  MAP: 9  PIP: 18      Assessment   LAUREN CKD 4; respiratory failure;   HD dependent         Plan:  HD for today  UF as tolerated  Serologic screen  Prognosis guarded    Jimbo Hutchinson MD

## 2021-09-11 NOTE — PROGRESS NOTE ADULT - SUBJECTIVE AND OBJECTIVE BOX
Intubated in ICU    Vital Signs Last 24 Hrs  T(C): 37.3 (09-11-21 @ 13:10), Max: 37.3 (09-11-21 @ 07:00)  T(F): 99.1 (09-11-21 @ 13:10), Max: 99.2 (09-11-21 @ 07:00)  HR: 113 (09-11-21 @ 13:20) (78 - 119)  BP: 104/64 (09-11-21 @ 13:20) (59/40 - 196/98)  BP(mean): 75 (09-11-21 @ 13:20) (47 - 122)  RR: 19 (09-11-21 @ 13:20) (14 - 21)  SpO2: 100% (09-11-21 @ 13:20) (94% - 100%)    Lungs coarse BS b/l  Heart S1S2  Abd soft NT ND  Extremities: + edema                        8.1    21.76 )-----------( 72       ( 11 Sep 2021 03:34 )             23.2     11 Sep 2021 03:34    141    |  107    |  80     ----------------------------<  232    3.5     |  27     |  2.44     Ca    7.8        11 Sep 2021 03:34  Phos  4.3       11 Sep 2021 03:34  Mg     2.5       11 Sep 2021 03:34    TPro  5.1    /  Alb  1.6    /  TBili  5.1    /  DBili  x      /  AST  279    /  ALT  139    /  AlkPhos  996    11 Sep 2021 03:34    LIVER FUNCTIONS - ( 11 Sep 2021 03:34 )  Alb: 1.6 g/dL / Pro: 5.1 gm/dL / ALK PHOS: 996 U/L / ALT: 139 U/L / AST: 279 U/L / GGT: x           ALBUTerol    0.083% 2.5 milliGRAM(s) Nebulizer every 6 hours  chlorhexidine 0.12% Liquid 15 milliLiter(s) Oral Mucosa every 12 hours  chlorhexidine 2% Cloths 1 Application(s) Topical <User Schedule>  dextrose 40% Gel 15 Gram(s) Oral once  dextrose 5%. 1000 milliLiter(s) IV Continuous <Continuous>  dextrose 5%. 1000 milliLiter(s) IV Continuous <Continuous>  dextrose 50% Injectable 25 Gram(s) IV Push once  dextrose 50% Injectable 12.5 Gram(s) IV Push once  dextrose 50% Injectable 25 Gram(s) IV Push once  glucagon  Injectable 1 milliGRAM(s) IntraMuscular once  insulin lispro (ADMELOG) corrective regimen sliding scale   SubCutaneous every 6 hours  norepinephrine Infusion 0.05 MICROgram(s)/kG/Min IV Continuous <Continuous>  oxyCODONE    IR 5 milliGRAM(s) Oral every 6 hours PRN  pantoprazole  Injectable 40 milliGRAM(s) IV Push every 12 hours  polyethylene glycol 3350 17 Gram(s) Oral daily  propofol Infusion 10 MICROgram(s)/kG/Min IV Continuous <Continuous>  senna Syrup 5 milliLiter(s) Oral daily  sodium chloride 0.9% lock flush 10 milliLiter(s) IV Push every 1 hour PRN  sodium chloride 3%  Inhalation 4 milliLiter(s) Inhalation every 6 hours    Assessment/Plan:    LAUREN/CKD 4; respiratory failure  HD dependent   HD today  UF as tolerated  Will eval for HD needs daily  Prognosis guarded  D/w ICU team    [phone]

## 2021-09-11 NOTE — PROGRESS NOTE ADULT - SUBJECTIVE AND OBJECTIVE BOX
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 Per daughter pt at baseline, AAOx2 at baseline. Daughter is translating for pt and reports pt got his foot got stuck and fell. Fall Witnessed by grandson. No head-strike or LOC. Pt on ASA 81mg. Denies AC use. Denies fever/chills, cough, CP, SOB, N/V, abdominal pain or headache. Pt unable to ambulate. Pt main complaint is right hip pain     **O/N:**  Remains intubated 18/500/40%/+5    ## ROS: Unobtainable    ## Labs:  ** CBC: **             8.1    21.76 )-----------( 72       ( 11 Sep 2021 03:34 )             23.2     ** Chem:  **  09-11    141  |  107  |  80<H>  ----------------------------<  232<H>  3.5   |  27  |  2.44<H>    Ca    7.8<L>      11 Sep 2021 03:34  Phos  4.3     09-11  Mg     2.5     09-11    TPro  5.1<L>  /  Alb  1.6<L>  /  TBili  5.1<H>  /  DBili  x   /  AST  279<H>  /  ALT  139<H>  /  AlkPhos  996<H>  09-11    POCT Blood Glucose.: 179 mg/dL (11 Sep 2021 17:08)  POCT Blood Glucose.: 169 mg/dL (11 Sep 2021 11:27)  POCT Blood Glucose.: 252 mg/dL (11 Sep 2021 05:14)  POCT Blood Glucose.: 185 mg/dL (11 Sep 2021 00:48)    Culture - Sputum (collected 03 Sep 2021 00:38)  Organism: Pseudomonas aeruginosa (04 Sep 2021 18:04)      -  Amikacin: S <=16      -  Aztreonam: S <=4      -  Cefepime: S <=2      -  Ceftazidime: S <=1      -  Ciprofloxacin: S <=0.25      -  Gentamicin: S 4      -  Imipenem: I 4      -  Levofloxacin: S <=0.5      -  Meropenem: S <=1      -  Piperacillin/Tazobactam: S <=8      -  Tobramycin: S <=2      Method Type: DHARA    Culture - Urine (collected 02 Sep 2021 08:58): No growth  Culture - Blood (collected 02 Sep 2021 08:51): No Growth Final  Culture - Blood (collected 02 Sep 2021 08:51): No Growth Final    ## Meds:  norepinephrine Infusion 0.05 MICROgram(s)/kG/Min IV Continuous <Continuous>  phenylephrine    Infusion 0.5 MICROgram(s)/kG/Min IV Continuous <Continuous>  ALBUTerol    0.083% 2.5 milliGRAM(s) Nebulizer every 6 hours  sodium chloride 3%  Inhalation 4 milliLiter(s) Inhalation every 6 hours  oxyCODONE    IR 5 milliGRAM(s) Oral every 6 hours PRN  propofol Infusion 10 MICROgram(s)/kG/Min IV Continuous <Continuous>  pantoprazole  Injectable 40 milliGRAM(s) IV Push every 12 hours  polyethylene glycol 3350 17 Gram(s) Oral daily  senna Syrup 5 milliLiter(s) Oral daily  insulin lispro (ADMELOG) corrective regimen sliding scale   SubCutaneous every 6 hours    ## O/E:  T(F): 100 (11 Sep 2021 19:50), Max: 100 (11 Sep 2021 19:50)  HR: 133 (11 Sep 2021 22:20) (78 - 133)  BP: 89/79 (11 Sep 2021 22:20) (59/40 - 181/66)  BP(mean): 84 (11 Sep 2021 22:20) (47 - 106)  RR: 25 (11 Sep 2021 22:20) (16 - 25)  SpO2: 97% (11 Sep 2021 21:27) (94% - 100%)  IN: 912.9 mL / OUT: 740 mL / NET: 172.9 mL    Gen: lying in bed intubated  HEENT: PERRL  Resp: mechanical breath sounds B/L  CVS: S1S2 no m/r/g  Abd: soft NT/ND +BS  Ext: no c/c; 1–2+ edema  Neuro: sedated    ## Assessment / Plan:  81M initially s/p fall a/w R femoral fracture with complicated hospital course  - Intubated for procedure, then with bleeding and shock requiring re-intubation on same day  - extubated 9/7, but on 9/9 patient had epistaxis and worry for aspiration, and was re-intubated.  - currently increased secretions, will add hyperSAL and NEBs. Recently s/p Zosyn for Pseudomonas in sputum  - received blood day-before-yesterday Hgb stable  - HD per nephro, getting today  - SCDs for DVT ppx    ## Code status:  Goals of care discussion: [x] yes [ ] no  [x] full code  [ ] DNR  [ ] DNI  [ ] MOLST    I have personally provided 50 minutes of critical care time.

## 2021-09-12 NOTE — PROGRESS NOTE ADULT - SUBJECTIVE AND OBJECTIVE BOX
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 Per daughter pt at baseline, AAOx2 at baseline. Daughter is translating for pt and reports pt got his foot got stuck and fell. Fall Witnessed by grandson. No head-strike or LOC. Pt on ASA 81mg. Denies AC use. Denies fever/chills, cough, CP, SOB, N/V, abdominal pain or headache. Pt unable to ambulate. Pt main complaint is right hip pain     **O/N:**  Remains intubated 18/500/40%/+5    ## ROS: Unobtainable    ## Labs:  ** CBC: **             8.3    31.04 )-----------( 95       ( 12 Sep 2021 04:39 )             25.3     Hemoglobin:  8.3 g/dL *L* (09-12-21 @ 04:39)  8.1 g/dL *L* (09-11-21 @ 03:34)  7.6 g/dL *L* (09-10-21 @ 04:17)  8.1 g/dL *L* (09-09-21 @ 15:39)  8.2 g/dL *L* (09-09-21 @ 12:42)  7.1 g/dL *L* (09-09-21 @ 02:30)      ** Chem:  **  09-12    140  |  109<H>  |  71<H>  ----------------------------<  155<H>  4.2   |  22  |  2.68<H>    Ca    7.5<L>      12 Sep 2021 04:40  Phos  3.4     09-12  Mg     2.3     09-12    TPro  4.8<L>  /  Alb  1.2<L>  /  TBili  4.7<H>  /  DBili  x   /  AST  230<H>  /  ALT  151<H>  /  AlkPhos  996<H>  09-12    POCT Blood Glucose.: 175 mg/dL (12 Sep 2021 17:13)  POCT Blood Glucose.: 198 mg/dL (12 Sep 2021 12:38)  POCT Blood Glucose.: 175 mg/dL (12 Sep 2021 05:29)  POCT Blood Glucose.: 142 mg/dL (11 Sep 2021 23:47)    Culture - Sputum (collected 03 Sep 2021 00:38)  Organism: Pseudomonas aeruginosa (04 Sep 2021 18:04)      -  Amikacin: S <=16      -  Aztreonam: S <=4      -  Cefepime: S <=2      -  Ceftazidime: S <=1      -  Ciprofloxacin: S <=0.25      -  Gentamicin: S 4      -  Imipenem: I 4      -  Levofloxacin: S <=0.5      -  Meropenem: S <=1      -  Piperacillin/Tazobactam: S <=8      -  Tobramycin: S <=2      Method Type: DHARA    Culture - Urine (collected 02 Sep 2021 08:58): No growth  Culture - Blood (collected 02 Sep 2021 08:51): No Growth Final  Culture - Blood (collected 02 Sep 2021 08:51): No Growth Final    ## Meds:  aMIOdarone Infusion 0.5 mG/Min IV Continuous <Continuous>  midodrine 5 milliGRAM(s) Oral every 8 hours  phenylephrine    Infusion 0.5 MICROgram(s)/kG/Min IV Continuous <Continuous>  ALBUTerol    0.083% 2.5 milliGRAM(s) Nebulizer every 6 hours  sodium chloride 3%  Inhalation 4 milliLiter(s) Inhalation every 6 hours  acetaminophen   Tablet .. 650 milliGRAM(s) Oral every 6 hours PRN  oxyCODONE    IR 5 milliGRAM(s) Oral every 6 hours PRN  propofol Infusion 10 MICROgram(s)/kG/Min IV Continuous <Continuous>  pantoprazole  Injectable 40 milliGRAM(s) IV Push every 12 hours  polyethylene glycol 3350 17 Gram(s) Oral daily  senna Syrup 5 milliLiter(s) Oral daily  insulin lispro (ADMELOG) corrective regimen sliding scale   SubCutaneous every 6 hours    ## O/E:  T(F): 101.2 (09-12-21 @ 19:14), Max: 101.2 (09-12-21 @ 16:23)  HR: 84 (09-12-21 @ 22:30) (80 - 139)  BP: 124/63 (09-12-21 @ 22:30) (52/36 - 153/62)  RR: 17 (09-12-21 @ 22:30) (17 - 25)  SpO2: 100% (09-12-21 @ 22:30) (62% - 100%)  IN: 1725.1 mL / OUT: 325 mL / NET: 1400.1 mL    Mode: AC/ CMV (Assist Control/ Continuous Mandatory Ventilation), RR (machine): 18, TV (machine): 500, FiO2: 40, PEEP: 5, ITime: 0.8, MAP: 12, PIP: 24    Gen: lying in bed intubated  HEENT: PERRL  Resp: mechanical breath sounds B/L  CVS: S1S2 no m/r/g  Abd: soft NT/ND +BS  Ext: no c/c; 1+ edema  Neuro: sedated    ## Assessment / Plan:  81M initially s/p fall a/w R femoral fracture with complicated hospital course  - Intubated for procedure, then with bleeding and shock requiring re-intubation on same day  - extubated 9/7, but on 9/9 patient had epistaxis and worry for aspiration, and was re-intubated.  - currently increased secretions, c/w hyperSAL and NEBs. Recently s/p Zosyn for Pseudomonas in sputum. F/U repeat cultures  - c/w amio for recent AF/RVR  - HD per nephro, got yesterday  - SCDs for DVT ppx    ## Code status:  Goals of care discussion: [x] yes [ ] no  [x] full code  [ ] DNR  [ ] DNI  [ ] MOLST    I have personally provided 50 minutes of critical care time.

## 2021-09-12 NOTE — PROGRESS NOTE ADULT - SUBJECTIVE AND OBJECTIVE BOX
Intubated in ICU    Vital Signs Last 24 Hrs  T(C): 37.7 (09-12-21 @ 08:35), Max: 38.3 (09-11-21 @ 23:20)  T(F): 99.8 (09-12-21 @ 08:35), Max: 101 (09-11-21 @ 23:20)  HR: 90 (09-12-21 @ 13:43) (86 - 148)  BP: 127/58 (09-12-21 @ 13:30) (52/36 - 153/62)  BP(mean): 78 (09-12-21 @ 13:30) (42 - 89)  RR: 18 (09-12-21 @ 13:30) (17 - 25)  SpO2: 100% (09-12-21 @ 13:43) (62% - 100%)    I&O's Detail    11 Sep 2021 07:01  -  12 Sep 2021 07:00  --------------------------------------------------------  IN:    Amiodarone: 199.8 mL    Nepro with Carb Steady: 780 mL    Norepinephrine: 242.3 mL    Phenylephrine: 294.8 mL    Propofol: 208.2 mL  Total IN: 1725.1 mL    OUT:    Indwelling Catheter - Urethral (mL): 325 mL    Other (mL): 0 mL  Total OUT: 325 mL    Total NET: 1400.1 mL    12 Sep 2021 07:01  -  12 Sep 2021 14:18  --------------------------------------------------------  IN:    Amiodarone: 33.3 mL    Amiodarone: 100.2 mL    Nepro with Carb Steady: 210 mL    Phenylephrine: 216 mL    Propofol: 69.6 mL  Total IN: 629.1 mL    OUT:  Total OUT: 0 mL    Total NET: 629.1 mL    Lungs coarse BS b/l  Heart S1S2  Abd soft NT ND  Extremities: + edema                                     8.3    31.04 )-----------( 95       ( 12 Sep 2021 04:39 )             25.3     12 Sep 2021 04:40    140    |  109    |  71     ----------------------------<  155    4.2     |  22     |  2.68     Ca    7.5        12 Sep 2021 04:40  Phos  3.4       12 Sep 2021 04:40  Mg     2.3       12 Sep 2021 04:40    TPro  4.8    /  Alb  1.2    /  TBili  4.7    /  DBili  x      /  AST  230    /  ALT  151    /  AlkPhos  996    12 Sep 2021 04:40    LIVER FUNCTIONS - ( 12 Sep 2021 04:40 )  Alb: 1.2 g/dL / Pro: 4.8 gm/dL / ALK PHOS: 996 U/L / ALT: 151 U/L / AST: 230 U/L / GGT: x           ALBUTerol    0.083% 2.5 milliGRAM(s) Nebulizer every 6 hours  aMIOdarone Infusion 1 mG/Min IV Continuous <Continuous>  aMIOdarone Infusion 0.5 mG/Min IV Continuous <Continuous>  chlorhexidine 0.12% Liquid 15 milliLiter(s) Oral Mucosa every 12 hours  chlorhexidine 2% Cloths 1 Application(s) Topical <User Schedule>  dextrose 40% Gel 15 Gram(s) Oral once  dextrose 5%. 1000 milliLiter(s) IV Continuous <Continuous>  dextrose 5%. 1000 milliLiter(s) IV Continuous <Continuous>  dextrose 50% Injectable 25 Gram(s) IV Push once  dextrose 50% Injectable 12.5 Gram(s) IV Push once  dextrose 50% Injectable 25 Gram(s) IV Push once  glucagon  Injectable 1 milliGRAM(s) IntraMuscular once  insulin lispro (ADMELOG) corrective regimen sliding scale   SubCutaneous every 6 hours  midodrine 5 milliGRAM(s) Oral every 8 hours  norepinephrine Infusion 0.05 MICROgram(s)/kG/Min IV Continuous <Continuous>  oxyCODONE    IR 5 milliGRAM(s) Oral every 6 hours PRN  pantoprazole  Injectable 40 milliGRAM(s) IV Push every 12 hours  phenylephrine    Infusion 0.5 MICROgram(s)/kG/Min IV Continuous <Continuous>  polyethylene glycol 3350 17 Gram(s) Oral daily  propofol Infusion 10 MICROgram(s)/kG/Min IV Continuous <Continuous>  senna Syrup 5 milliLiter(s) Oral daily  sodium chloride 0.9% lock flush 10 milliLiter(s) IV Push every 1 hour PRN  sodium chloride 3%  Inhalation 4 milliLiter(s) Inhalation every 6 hours    Assessment/Plan:    LAUREN/CKD 4; respiratory failure  HD dependent   S/p HD yesterday  Tolerated poorly, completed 2.5hr  Will re-eval for HD needs in am  Prognosis is guarded overall    [phone]

## 2021-09-13 NOTE — PROGRESS NOTE ADULT - ASSESSMENT
R hip fracture  POD # 12 s/p R IMN  Management per ortho    Acute respiratory failure  Reintubated 9/9 for airway protection  s/p Zosyn for Pseudomonas in sputum    Shock, septic v hemorrhagic  Acute on Chronic hypotension on midodrine  off antibiotics     Acute blood loss anemia  likely from bleeding from fracture  CT pelvis and RLE reviewed, no hematoma  monitor cbc  transfuse to keep >7    Thrombocytopenia  transfused plt    Transaminitis  likely due to shock  trend LFTs    LAUREN on CKD stage 4  Hypokalemia  Hypernatremia  likely due to rhabdo v shock  nephrology following  HD per nephrology  TF with free water    Chronic COPD  Not in exacerbation

## 2021-09-13 NOTE — PROGRESS NOTE ADULT - SUBJECTIVE AND OBJECTIVE BOX
Patient is a 81y old  Male who presents with a chief complaint of Right hip fracture    INTERVAL HPI/OVERNIGHT EVENTS:  remains intubated    MEDICATIONS  (STANDING):  ALBUTerol    0.083% 2.5 milliGRAM(s) Nebulizer every 6 hours  chlorhexidine 0.12% Liquid 15 milliLiter(s) Oral Mucosa every 12 hours  chlorhexidine 2% Cloths 1 Application(s) Topical <User Schedule>  dextrose 40% Gel 15 Gram(s) Oral once  dextrose 5%. 1000 milliLiter(s) (50 mL/Hr) IV Continuous <Continuous>  dextrose 5%. 1000 milliLiter(s) (100 mL/Hr) IV Continuous <Continuous>  dextrose 50% Injectable 25 Gram(s) IV Push once  dextrose 50% Injectable 12.5 Gram(s) IV Push once  dextrose 50% Injectable 25 Gram(s) IV Push once  glucagon  Injectable 1 milliGRAM(s) IntraMuscular once  insulin lispro (ADMELOG) corrective regimen sliding scale   SubCutaneous every 6 hours  midodrine 5 milliGRAM(s) Oral every 8 hours  pantoprazole  Injectable 40 milliGRAM(s) IV Push every 12 hours  phenylephrine    Infusion 0.5 MICROgram(s)/kG/Min (12.1 mL/Hr) IV Continuous <Continuous>  polyethylene glycol 3350 17 Gram(s) Oral daily  propofol Infusion 10 MICROgram(s)/kG/Min (3.88 mL/Hr) IV Continuous <Continuous>  senna Syrup 5 milliLiter(s) Oral daily  sodium chloride 3%  Inhalation 4 milliLiter(s) Inhalation every 6 hours    MEDICATIONS  (PRN):  acetaminophen   Tablet .. 650 milliGRAM(s) Oral every 6 hours PRN Temp greater or equal to 38C (100.4F)  oxyCODONE    IR 5 milliGRAM(s) Oral every 6 hours PRN Moderate Pain (4 - 6)  sodium chloride 0.9% lock flush 10 milliLiter(s) IV Push every 1 hour PRN Pre/post blood products, medications, blood draw, and to maintain line patency    Allergies:   No Known Allergies    REVIEW OF SYSTEMS:  All other systems reviewed and are negative    Vital Signs Last 24 Hrs  T(C): 37.9 (13 Sep 2021 07:30), Max: 38.4 (12 Sep 2021 16:23)  T(F): 100.2 (13 Sep 2021 07:30), Max: 101.2 (12 Sep 2021 16:23)  HR: 104 (13 Sep 2021 10:30) (80 - 153)  BP: 112/59 (13 Sep 2021 10:30) (82/45 - 166/78)  BP(mean): 73 (13 Sep 2021 10:30) (57 - 97)  RR: 20 (13 Sep 2021 10:30) (14 - 27)  SpO2: 100% (13 Sep 2021 10:30) (95% - 100%)  Daily     Daily Weight in k.4 (13 Sep 2021 02:00)  I&O's Summary    12 Sep 2021 07:01  -  13 Sep 2021 07:00  --------------------------------------------------------  IN: 2009.7 mL / OUT: 150 mL / NET: 1859.7 mL    13 Sep 2021 07:01  -  13 Sep 2021 11:34  --------------------------------------------------------  IN: 155.4 mL / OUT: 75 mL / NET: 80.4 mL    CAPILLARY BLOOD GLUCOSE    POCT Blood Glucose.: 155 mg/dL (13 Sep 2021 11:31)  POCT Blood Glucose.: 170 mg/dL (13 Sep 2021 06:06)  POCT Blood Glucose.: 178 mg/dL (12 Sep 2021 23:11)  POCT Blood Glucose.: 175 mg/dL (12 Sep 2021 17:13)  POCT Blood Glucose.: 198 mg/dL (12 Sep 2021 12:38)    PHYSICAL EXAM:  GENERAL: Intubated  HEAD:  Atraumatic, Normocephalic  EYES: PERRLA, conjunctiva and sclera clear  ENMT: Moist mucous membranes  NECK: Supple, No JVD  NERVOUS SYSTEM: Not alert, not oriented  CHEST/LUNG: Clear to auscultation bilaterally  HEART: Regular rate and rhythm  ABDOMEN: Soft, Nontender, Nondistended; Bowel sounds present  EXTREMITIES: surgical site c/d/i  SKIN: No rashes or lesions    Labs                      7.8    20.17 )-----------( Clotted    ( 13 Sep 2021 04:21 )             23.2         135  |  103  |  94<H>  ----------------------------<  171<H>  4.0   |  22  |  3.60<H>    Ca    7.8<L>      13 Sep 2021 04:21  Phos  4.5       Mg     2.5         TPro  4.7<L>  /  Alb  1.0<L>  /  TBili  5.4<H>  /  DBili  x   /  AST  183<H>  /  ALT  128<H>  /  AlkPhos  952<H>      Culture - Sputum (collected 12 Sep 2021 12:51)  Source: .Sputum Sputum  Gram Stain (12 Sep 2021 22:12):    No polymorphonuclear leukocytes per low power field    No Squamous epithelial cells per low power field    Numerous Gram Negative Rods per oil power field      DVT prophylaxis:  SCD's

## 2021-09-13 NOTE — PROGRESS NOTE ADULT - SUBJECTIVE AND OBJECTIVE BOX
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 Per daughter pt at baseline, AAOx2 at baseline. Daughter is translating for pt and reports pt got his foot got stuck and fell. Fall Witnessed by grandson. No head-strike or LOC. Pt on ASA 81mg. Denies AC use. Denies fever/chills, cough, CP, SOB, N/V, abdominal pain or headache. Pt unable to ambulate. Pt main complaint is right hip pain     **O/N:**  Remains intubated 18/500/40%/+5    ## ROS: Unobtainable    ## Labs:  ** CBC: **             7.8    20.17 )-----------( Clotted    ( 13 Sep 2021 04:21 )             23.2     ** Chem:  ** 09-14  137  |  106  |  110<H>  ----------------------------<  129<H>  4.4   |  22  |  4.37<H>    Ca    8.0<L>      14 Sep 2021 02:32  Phos  5.4     09-14  Mg     2.6     09-14    TPro  4.8<L>  /  Alb  1.0<L>  /  TBili  5.9<H>  /  DBili  x   /  AST  144<H>  /  ALT  97<H>  /  AlkPhos  971<H>  09-14    POCT Blood Glucose.: 132 mg/dL (13 Sep 2021 23:27)  POCT Blood Glucose.: 134 mg/dL (13 Sep 2021 17:02)  POCT Blood Glucose.: 155 mg/dL (13 Sep 2021 11:31)  POCT Blood Glucose.: 170 mg/dL (13 Sep 2021 06:06)    Culture - Sputum (collected 03 Sep 2021 00:38)  Organism: Pseudomonas aeruginosa (04 Sep 2021 18:04)      -  Amikacin: S <=16      -  Aztreonam: S <=4      -  Cefepime: S <=2      -  Ceftazidime: S <=1      -  Ciprofloxacin: S <=0.25      -  Gentamicin: S 4      -  Imipenem: I 4      -  Levofloxacin: S <=0.5      -  Meropenem: S <=1      -  Piperacillin/Tazobactam: S <=8      -  Tobramycin: S <=2      Method Type: DHARA    Culture - Urine (collected 02 Sep 2021 08:58): No growth  Culture - Blood (collected 02 Sep 2021 08:51): No Growth Final  Culture - Blood (collected 02 Sep 2021 08:51): No Growth Final    ## Meds:  aMIOdarone    Tablet 200 milliGRAM(s) Oral daily  midodrine 5 milliGRAM(s) Oral every 8 hours  phenylephrine    Infusion 0.5 MICROgram(s)/kG/Min IV Continuous <Continuous>  ALBUTerol    0.083% 2.5 milliGRAM(s) Nebulizer every 6 hours  sodium chloride 3%  Inhalation 4 milliLiter(s) Inhalation every 6 hours  acetaminophen   Tablet .. 650 milliGRAM(s) Oral every 6 hours PRN  fentaNYL    Injectable 25 MICROGram(s) IV Push every 8 hours PRN  oxyCODONE    IR 5 milliGRAM(s) Oral every 6 hours PRN  pantoprazole  Injectable 40 milliGRAM(s) IV Push every 12 hours  polyethylene glycol 3350 17 Gram(s) Oral daily  senna Syrup 5 milliLiter(s) Oral daily  insulin lispro (ADMELOG) corrective regimen sliding scale   SubCutaneous every 6 hours    ## O/E:  T(F): 100.2 (09-14-21 @ 00:00), Max: 100.2 (09-13-21 @ 07:30)  HR: 99 (09-14-21 @ 03:00) (89 - 153)  BP: 149/47 (09-14-21 @ 03:00) (103/67 - 166/78)  RR: 21 (09-14-21 @ 03:00) (14 - 23)  SpO2: 100% (09-14-21 @ 03:00) (95% - 100%)  IN: 2009.7 mL / OUT: 150 mL / NET: 1859.7 mL    Mode: CPAP with PS, FiO2: 30, PEEP: 5, PS: 10, MAP: 7    Gen: lying in bed intubated  HEENT: PERRL  Resp: mechanical breath sounds B/L  CVS: S1S2 no m/r/g  Abd: soft NT/ND +BS  Ext: no c/c; 1+ edema  Neuro: sedated    ## Assessment / Plan:  81M initially s/p fall a/w R femoral fracture with complicated hospital course  - Intubated for procedure, then with bleeding and shock requiring re-intubation on same day  - extubated 9/7, but on 9/9 patient had epistaxis and worry for aspiration, and was re-intubated.  - still increased secretions, c/w hyperSAL and NEBs. Recently s/p Zosyn for Pseudomonas in sputum. F/U repeat cultures  - keep on PS ATC as tolerated  - daily SAT for possible extubation  - c/w amio for recent AF/RVR  - HD per nephro, per note getting today  - SCDs for DVT ppx    ## Code status:  Goals of care discussion: [x] yes [ ] no  [x] full code  [ ] DNR  [ ] DNI  [ ] MOLST    I have personally provided 50 minutes of critical care time.

## 2021-09-13 NOTE — PROGRESS NOTE ADULT - SUBJECTIVE AND OBJECTIVE BOX
[facility] NEPHROLOGY SERVICES, [facility]  NEPHROLOGY AND HYPERTENSION  [address]  [address]  [place] [address]  [phone]    MD ELSA SANTA MD ANDREY GONCHARUK, MD MADHU KORRAPATI, MD YELENA ROSENBERG, MD BINNY KOSHY, MD CHRISTOPHER CAPUTO, MD EDWARD BOVER, MD          Patient events noted  TAchycardial hypotensive    MEDICATIONS  (STANDING):  ALBUTerol    0.083% 2.5 milliGRAM(s) Nebulizer every 6 hours  chlorhexidine 0.12% Liquid 15 milliLiter(s) Oral Mucosa every 12 hours  chlorhexidine 2% Cloths 1 Application(s) Topical <User Schedule>  dextrose 40% Gel 15 Gram(s) Oral once  dextrose 5%. 1000 milliLiter(s) (50 mL/Hr) IV Continuous <Continuous>  dextrose 5%. 1000 milliLiter(s) (100 mL/Hr) IV Continuous <Continuous>  dextrose 50% Injectable 25 Gram(s) IV Push once  dextrose 50% Injectable 12.5 Gram(s) IV Push once  dextrose 50% Injectable 25 Gram(s) IV Push once  glucagon  Injectable 1 milliGRAM(s) IntraMuscular once  insulin lispro (ADMELOG) corrective regimen sliding scale   SubCutaneous every 6 hours  midodrine 5 milliGRAM(s) Oral every 8 hours  pantoprazole  Injectable 40 milliGRAM(s) IV Push every 12 hours  phenylephrine    Infusion 0.5 MICROgram(s)/kG/Min (12.1 mL/Hr) IV Continuous <Continuous>  polyethylene glycol 3350 17 Gram(s) Oral daily  propofol Infusion 10 MICROgram(s)/kG/Min (3.88 mL/Hr) IV Continuous <Continuous>  senna Syrup 5 milliLiter(s) Oral daily  sodium chloride 3%  Inhalation 4 milliLiter(s) Inhalation every 6 hours    MEDICATIONS  (PRN):  acetaminophen   Tablet .. 650 milliGRAM(s) Oral every 6 hours PRN Temp greater or equal to 38C (100.4F)  oxyCODONE    IR 5 milliGRAM(s) Oral every 6 hours PRN Moderate Pain (4 - 6)  sodium chloride 0.9% lock flush 10 milliLiter(s) IV Push every 1 hour PRN Pre/post blood products, medications, blood draw, and to maintain line patency      09-12-21 @ 07:01  -  09-13-21 @ 07:00  --------------------------------------------------------  IN: 2009.7 mL / OUT: 150 mL / NET: 1859.7 mL    09-13-21 @ 07:01  -  09-13-21 @ 10:02  --------------------------------------------------------  IN: 103.6 mL / OUT: 65 mL / NET: 38.6 mL      PHYSICAL EXAM:      T(C): 37.9 (09-13-21 @ 07:30), Max: 38.4 (09-12-21 @ 16:23)  HR: 139 (09-13-21 @ 09:30) (80 - 139)  BP: 110/71 (09-13-21 @ 09:30) (82/45 - 166/78)  RR: 22 (09-13-21 @ 09:30) (14 - 27)  SpO2: 95% (09-13-21 @ 09:30) (95% - 100%)  Wt(kg): --  Lungs clear  Heart S1S2  Abd soft NT ND  Extremities:   1 -2 edema                                    7.8    20.17 )-----------( Clotted    ( 13 Sep 2021 04:21 )             23.2     09-13    135  |  103  |  94<H>  ----------------------------<  171<H>  4.0   |  22  |  3.60<H>    Ca    7.8<L>      13 Sep 2021 04:21  Phos  4.5     09-13  Mg     2.5     09-13    TPro  4.7<L>  /  Alb  1.0<L>  /  TBili  5.4<H>  /  DBili  x   /  AST  183<H>  /  ALT  128<H>  /  AlkPhos  952<H>  09-13      LIVER FUNCTIONS - ( 13 Sep 2021 04:21 )  Alb: 1.0 g/dL / Pro: 4.7 gm/dL / ALK PHOS: 952 U/L / ALT: 128 U/L / AST: 183 U/L / GGT: x           Creatinine Trend: 3.60<--, 2.68<--, 2.44<--, 3.38<--, 4.11<--, 3.71<--  Mode: CPAP with PS  FiO2: 30  PEEP: 5  PS: 5  MAP: 7      Assessment   LAUREN CKD 4; respiratory failure;   HD dependent   Tachycardia, hypotension        Plan:  HD for today  Will assess for AM  Serologic screen pending   Prognosis guarded    Jimbo Hutchinson MD

## 2021-09-14 NOTE — PROGRESS NOTE ADULT - ASSESSMENT
R hip fracture  POD # 13 s/p R IMN  Management per ortho    Acute respiratory failure  Reintubated 9/9 for airway protection  s/p Zosyn for Pseudomonas in sputum    Shock, septic v hemorrhagic  Acute on Chronic hypotension on midodrine  off antibiotics     Acute blood loss anemia  likely from bleeding from fracture  CT pelvis and RLE reviewed, no hematoma  monitor cbc  transfuse to keep >7    Thrombocytopenia  transfused plt    Transaminitis  likely due to shock  trend LFTs    LAUREN on CKD stage 4  Hypokalemia  Hypernatremia  likely due to rhabdo v shock  nephrology following  HD per nephrology  TF with free water    Chronic COPD  Not in exacerbation

## 2021-09-14 NOTE — CHART NOTE - NSCHARTNOTEFT_GEN_A_CORE
Discussed with patient daughter Clary pt recurrent need for intubation/mechanical ventilation and discussed pt and family wishes regarding further goals of care. For now pt daughter states that she wants for pt to remain intubated for now given his hospital course and baseline, but will have ongoing discussions with staff re plan as time develops further.

## 2021-09-14 NOTE — PROGRESS NOTE ADULT - SUBJECTIVE AND OBJECTIVE BOX
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 Per daughter pt at baseline, AAOx2 at baseline. Daughter is translating for pt and reports pt got his foot got stuck and fell. Fall Witnessed by grandson. No head-strike or LOC. Pt on ASA 81mg. Denies AC use. Denies fever/chills, cough, CP, SOB, N/V, abdominal pain or headache. Pt unable to ambulate. Pt main complaint is right hip pain     **O/N:**  Remains intubated on PS 5/5 ATC    ## ROS: Unobtainable    ## Labs:  ** CBC: **                        8.0    11.79 )-----------( 123      ( 14 Sep 2021 08:51 )             24.3     ** Chem:  **  09-14    137  |  106  |  110<H>  ----------------------------<  129<H>  4.4   |  22  |  4.37<H>    Ca    8.0<L>      14 Sep 2021 02:32  Phos  5.4     09-14  Mg     2.6     09-14    TPro  4.8<L>  /  Alb  1.0<L>  /  TBili  5.9<H>  /  DBili  x   /  AST  144<H>  /  ALT  97<H>  /  AlkPhos  971<H>  09-14    ** Coags: **      CAPILLARY BLOOD GLUCOSE      POCT Blood Glucose.: 127 mg/dL (14 Sep 2021 22:06)  POCT Blood Glucose.: 98 mg/dL (14 Sep 2021 17:07)  POCT Blood Glucose.: 156 mg/dL (14 Sep 2021 11:18)  POCT Blood Glucose.: 134 mg/dL (14 Sep 2021 05:20)      Culture - Sputum (collected 03 Sep 2021 00:38)  Organism: Pseudomonas aeruginosa (04 Sep 2021 18:04)      -  Amikacin: S <=16      -  Aztreonam: S <=4      -  Cefepime: S <=2      -  Ceftazidime: S <=1      -  Ciprofloxacin: S <=0.25      -  Gentamicin: S 4      -  Imipenem: I 4      -  Levofloxacin: S <=0.5      -  Meropenem: S <=1      -  Piperacillin/Tazobactam: S <=8      -  Tobramycin: S <=2      Method Type: DHARA    Culture - Urine (collected 02 Sep 2021 08:58): No growth  Culture - Blood (collected 02 Sep 2021 08:51): No Growth Final  Culture - Blood (collected 02 Sep 2021 08:51): No Growth Final    ## Labs:  ** CBC: **                        8.0    11.79 )-----------( 123      ( 14 Sep 2021 08:51 )             24.3     ** Chem:  **  09-14    137  |  106  |  110<H>  ----------------------------<  129<H>  4.4   |  22  |  4.37<H>    Ca    8.0<L>      14 Sep 2021 02:32  Phos  5.4     09-14  Mg     2.6     09-14    TPro  4.8<L>  /  Alb  1.0<L>  /  TBili  5.9<H>  /  DBili  x   /  AST  144<H>  /  ALT  97<H>  /  AlkPhos  971<H>  09-14    ** Coags: **      CAPILLARY BLOOD GLUCOSE      POCT Blood Glucose.: 127 mg/dL (14 Sep 2021 22:06)  POCT Blood Glucose.: 98 mg/dL (14 Sep 2021 17:07)  POCT Blood Glucose.: 156 mg/dL (14 Sep 2021 11:18)  POCT Blood Glucose.: 134 mg/dL (14 Sep 2021 05:20)      ## O/E:  T(F): 99.9 (09-14-21 @ 19:58), Max: 99.9 (09-14-21 @ 19:58)  HR: 97 (09-15-21 @ 01:00) (92 - 127)  BP: 103/54 (09-15-21 @ 01:00) (78/43 - 149/47)  ABP: --  ABP(mean): --  RR: 24 (09-15-21 @ 01:00) (18 - 27)  SpO2: 100% (09-15-21 @ 01:00) (92% - 100%)    09-13 @ 07:01  -  09-14 @ 07:00  --------------------------------------------------------  IN: 1212.6 mL / OUT: 510 mL / NET: 702.6 mL    09-14 @ 07:01  -  09-15 @ 01:34  --------------------------------------------------------  IN: 1093.5 mL / OUT: 1170 mL / NET: -76.5 mL      Mode: AC/ CMV (Assist Control/ Continuous Mandatory Ventilation), RR (machine): 18, TV (machine): 500, FiO2: 30, PEEP: 5, ITime: 0.8, MAP: 8, PIP: 15  Gen: lying in bed intubated  HEENT: PERRL  Resp: mechanical breath sounds B/L  CVS: S1S2 no m/r/g  Abd: soft NT/ND +BS  Ext: no c/c; 1+ edema  Neuro: sedated    ## Assessment / Plan:  81M initially s/p fall a/w R femoral fracture with complicated hospital course  - Intubated for procedure, then with bleeding and shock requiring re-intubation on same day  - extubated 9/7, but on 9/9 patient had epistaxis and worry for aspiration, and was re-intubated.  - still with some secretions, c/w hyperSAL and NEBs. Recently s/p Zosyn for Pseudomonas in sputum. F/U repeat cultures  - keep on PS ATC as tolerated  - patient appears close to liberation from vent. However, it is unclear if he would remain consistently off vent, given decompensation and two prior failures on attempts to extubation.  - GOC discussions with family re: trache / PEG and vent facility vs palliative wean  - HD per nephro  - SCDs for DVT ppx    ## Code status:  Goals of care discussion: [x] yes [ ] no  [x] full code  [ ] DNR  [ ] DNI  [ ] MOLST    I have personally provided 50 minutes of critical care time.

## 2021-09-14 NOTE — PROGRESS NOTE ADULT - SUBJECTIVE AND OBJECTIVE BOX
[facility] NEPHROLOGY SERVICES, [facility]  NEPHROLOGY AND HYPERTENSION  [address]  [address]  [place] [address]  [phone]    MD ELSA SANTA MD ANDREY GONCHARUK, MD MADHU KORRAPATI, MD YELENA ROSENBERG, MD BINNY KOSHY, MD CHRISTOPHER CAPUTO, MD EDWARD BOVER, MD          Patient events noted  Vent dependent     MEDICATIONS  (STANDING):  ALBUTerol    0.083% 2.5 milliGRAM(s) Nebulizer every 6 hours  aMIOdarone    Tablet 200 milliGRAM(s) Oral daily  chlorhexidine 0.12% Liquid 15 milliLiter(s) Oral Mucosa every 12 hours  chlorhexidine 2% Cloths 1 Application(s) Topical <User Schedule>  dextrose 40% Gel 15 Gram(s) Oral once  dextrose 5%. 1000 milliLiter(s) (50 mL/Hr) IV Continuous <Continuous>  dextrose 5%. 1000 milliLiter(s) (100 mL/Hr) IV Continuous <Continuous>  dextrose 50% Injectable 25 Gram(s) IV Push once  dextrose 50% Injectable 25 Gram(s) IV Push once  dextrose 50% Injectable 12.5 Gram(s) IV Push once  glucagon  Injectable 1 milliGRAM(s) IntraMuscular once  insulin lispro (ADMELOG) corrective regimen sliding scale   SubCutaneous every 6 hours  midodrine 5 milliGRAM(s) Oral every 8 hours  pantoprazole   Suspension 40 milliGRAM(s) Oral daily  petrolatum white Ointment 1 Application(s) Topical every 12 hours  phenylephrine    Infusion 0.5 MICROgram(s)/kG/Min (12.1 mL/Hr) IV Continuous <Continuous>  polyethylene glycol 3350 17 Gram(s) Oral daily  senna Syrup 5 milliLiter(s) Oral daily  sodium chloride 3%  Inhalation 4 milliLiter(s) Inhalation every 6 hours    MEDICATIONS  (PRN):  acetaminophen   Tablet .. 650 milliGRAM(s) Oral every 6 hours PRN Temp greater or equal to 38C (100.4F)  fentaNYL    Injectable 25 MICROGram(s) IV Push every 8 hours PRN Severe Pain (7 - 10)  oxyCODONE    IR 5 milliGRAM(s) Oral every 6 hours PRN Moderate Pain (4 - 6)  sodium chloride 0.9% lock flush 10 milliLiter(s) IV Push every 1 hour PRN Pre/post blood products, medications, blood draw, and to maintain line patency      09-13-21 @ 07:01  -  09-14-21 @ 07:00  --------------------------------------------------------  IN: 1212.6 mL / OUT: 510 mL / NET: 702.6 mL    09-14-21 @ 07:01  -  09-14-21 @ 11:52  --------------------------------------------------------  IN: 111.9 mL / OUT: 75 mL / NET: 36.9 mL      PHYSICAL EXAM:      T(C): 37.5 (09-14-21 @ 07:30), Max: 37.9 (09-13-21 @ 12:30)  HR: 92 (09-14-21 @ 11:38) (92 - 133)  BP: 127/58 (09-14-21 @ 10:30) (82/41 - 149/47)  RR: 20 (09-14-21 @ 10:30) (15 - 23)  SpO2: 100% (09-14-21 @ 11:38) (96% - 100%)  Wt(kg): --  Lungs clear  Heart S1S2  Abd soft NT ND  Extremities:   tr edema                                    8.0    11.79 )-----------( 123      ( 14 Sep 2021 08:51 )             24.3     09-14    137  |  106  |  110<H>  ----------------------------<  129<H>  4.4   |  22  |  4.37<H>    Ca    8.0<L>      14 Sep 2021 02:32  Phos  5.4     09-14  Mg     2.6     09-14    TPro  4.8<L>  /  Alb  1.0<L>  /  TBili  5.9<H>  /  DBili  x   /  AST  144<H>  /  ALT  97<H>  /  AlkPhos  971<H>  09-14      LIVER FUNCTIONS - ( 14 Sep 2021 02:32 )  Alb: 1.0 g/dL / Pro: 4.8 gm/dL / ALK PHOS: 971 U/L / ALT: 97 U/L / AST: 144 U/L / GGT: x           Creatinine Trend: 4.37<--, 3.60<--, 2.68<--, 2.44<--, 3.38<--, 4.11<--  Mode: CPAP with PS  FiO2: 30  PEEP: 5  PS: 5  ITime: 1  MAP: 7  PIP: 10      Assessment   LAUREN CKD 4; respiratory failure;   HD dependent   Tachycardia, hypotension        Plan:  HD for today  Serologic screen pending   Prognosis guarded      Jimbo Hutchinson MD

## 2021-09-14 NOTE — PROGRESS NOTE ADULT - SUBJECTIVE AND OBJECTIVE BOX
Patient is a 81y old  Male who presents with a chief complaint of Right hip fracture    INTERVAL HPI/OVERNIGHT EVENTS:  remains intubated    MEDICATIONS  (STANDING):  ALBUTerol    0.083% 2.5 milliGRAM(s) Nebulizer every 6 hours  aMIOdarone    Tablet 200 milliGRAM(s) Oral daily  chlorhexidine 0.12% Liquid 15 milliLiter(s) Oral Mucosa every 12 hours  chlorhexidine 2% Cloths 1 Application(s) Topical <User Schedule>  dextrose 40% Gel 15 Gram(s) Oral once  dextrose 5%. 1000 milliLiter(s) (50 mL/Hr) IV Continuous <Continuous>  dextrose 5%. 1000 milliLiter(s) (100 mL/Hr) IV Continuous <Continuous>  dextrose 50% Injectable 25 Gram(s) IV Push once  dextrose 50% Injectable 12.5 Gram(s) IV Push once  dextrose 50% Injectable 25 Gram(s) IV Push once  glucagon  Injectable 1 milliGRAM(s) IntraMuscular once  insulin lispro (ADMELOG) corrective regimen sliding scale   SubCutaneous every 6 hours  midodrine 5 milliGRAM(s) Oral every 8 hours  pantoprazole   Suspension 40 milliGRAM(s) Oral daily  petrolatum white Ointment 1 Application(s) Topical every 12 hours  phenylephrine    Infusion 0.5 MICROgram(s)/kG/Min (12.1 mL/Hr) IV Continuous <Continuous>  polyethylene glycol 3350 17 Gram(s) Oral daily  senna Syrup 5 milliLiter(s) Oral daily  sodium chloride 3%  Inhalation 4 milliLiter(s) Inhalation every 6 hours    MEDICATIONS  (PRN):  acetaminophen   Tablet .. 650 milliGRAM(s) Oral every 6 hours PRN Temp greater or equal to 38C (100.4F)  fentaNYL    Injectable 25 MICROGram(s) IV Push every 8 hours PRN Severe Pain (7 - 10)  oxyCODONE    IR 5 milliGRAM(s) Oral every 6 hours PRN Moderate Pain (4 - 6)  sodium chloride 0.9% lock flush 10 milliLiter(s) IV Push every 1 hour PRN Pre/post blood products, medications, blood draw, and to maintain line patency    Allergies:   No Known Allergies    REVIEW OF SYSTEMS:  All other systems reviewed and are negative    Vital Signs Last 24 Hrs  T(C): 37.6 (14 Sep 2021 11:30), Max: 37.9 (13 Sep 2021 12:30)  T(F): 99.6 (14 Sep 2021 11:30), Max: 100.2 (13 Sep 2021 12:30)  HR: 97 (14 Sep 2021 12:08) (92 - 133)  BP: 105/89 (14 Sep 2021 12:00) (82/41 - 149/47)  BP(mean): 96 (14 Sep 2021 12:00) (53 - 115)  RR: 24 (14 Sep 2021 12:00) (15 - 24)  SpO2: 100% (14 Sep 2021 12:08) (96% - 100%)    CAPILLARY BLOOD GLUCOSE    POCT Blood Glucose.: 156 mg/dL (14 Sep 2021 11:18)  POCT Blood Glucose.: 134 mg/dL (14 Sep 2021 05:20)  POCT Blood Glucose.: 132 mg/dL (13 Sep 2021 23:27)  POCT Blood Glucose.: 134 mg/dL (13 Sep 2021 17:02)    PHYSICAL EXAM:  GENERAL: Intubated  HEAD:  Atraumatic, Normocephalic  EYES: PERRLA, conjunctiva and sclera clear  ENMT: Moist mucous membranes  NECK: Supple, No JVD  NERVOUS SYSTEM: Not alert, not oriented  CHEST/LUNG: Clear to auscultation bilaterally  HEART: Regular rate and rhythm  ABDOMEN: Soft, Nontender, Nondistended; Bowel sounds present  EXTREMITIES: surgical site c/d/i  SKIN: No rashes or lesions    Labs                      8.0    11.79 )-----------( 123      ( 14 Sep 2021 08:51 )             24.3     09-14    137  |  106  |  110<H>  ----------------------------<  129<H>  4.4   |  22  |  4.37<H>    Ca    8.0<L>      14 Sep 2021 02:32  Phos  5.4     09-14  Mg     2.6     09-14    TPro  4.8<L>  /  Alb  1.0<L>  /  TBili  5.9<H>  /  DBili  x   /  AST  144<H>  /  ALT  97<H>  /  AlkPhos  971<H>  09-14      Culture - Sputum (collected 12 Sep 2021 12:51)  Source: .Sputum Sputum  Gram Stain (12 Sep 2021 22:12):    No polymorphonuclear leukocytes per low power field    No Squamous epithelial cells per low power field    Numerous Gram Negative Rods per oil power field      DVT prophylaxis:  SCD's

## 2021-09-15 NOTE — PROVIDER CONTACT NOTE (CRITICAL VALUE NOTIFICATION) - NAME OF MD/NP/PA/DO NOTIFIED:
Ju RICO
NP Yvette, Felicitas
JACKY Bermudez
Dr. Gill
MD Ayers
Dr Ayers
ISAIAH Orozco
ISAIAH Ward
JACKY Flowers
Dr. Pena

## 2021-09-15 NOTE — PROVIDER CONTACT NOTE (EICU) - ASSESSMENT
Upon video assessment pt intubated, on phenylephrine.  119/63, HR low 100s, pulse ox 100%
Looked at CXR and OGT seen below dipragm
Pt intubated, HR 90s, receiving blood on levo/lianet.
Pt intubated, sedated on 4mcg/kg/hr of fentanyl and 15mcg/kg/min of propofol. Levophed at 2mcg/kg/min. HR 90s, MAP 50s

## 2021-09-15 NOTE — PROGRESS NOTE ADULT - SUBJECTIVE AND OBJECTIVE BOX
[facility] NEPHROLOGY SERVICES, [facility]  NEPHROLOGY AND HYPERTENSION  [address]  [address]  [place] [address]  [phone]    MD ELSA SANTA MD ANDREY GONCHARUK, MD MADHU KORRAPATI, MD YELENA ROSENBERG, MD BINNY KOSHY, MD CHRISTOPHER CAPUTO, MD EDWARD BOVER, MD          Patient events noted  Vent dependent     MEDICATIONS  (STANDING):  ALBUTerol    0.083% 2.5 milliGRAM(s) Nebulizer every 6 hours  aMIOdarone    Tablet 200 milliGRAM(s) Oral daily  chlorhexidine 0.12% Liquid 15 milliLiter(s) Oral Mucosa every 12 hours  chlorhexidine 2% Cloths 1 Application(s) Topical <User Schedule>  dextrose 40% Gel 15 Gram(s) Oral once  dextrose 5%. 1000 milliLiter(s) (50 mL/Hr) IV Continuous <Continuous>  dextrose 5%. 1000 milliLiter(s) (100 mL/Hr) IV Continuous <Continuous>  dextrose 50% Injectable 25 Gram(s) IV Push once  dextrose 50% Injectable 12.5 Gram(s) IV Push once  dextrose 50% Injectable 25 Gram(s) IV Push once  glucagon  Injectable 1 milliGRAM(s) IntraMuscular once  insulin lispro (ADMELOG) corrective regimen sliding scale   SubCutaneous every 6 hours  midodrine 5 milliGRAM(s) Oral every 8 hours  pantoprazole   Suspension 40 milliGRAM(s) Oral daily  petrolatum white Ointment 1 Application(s) Topical every 12 hours  phenylephrine    Infusion 0.5 MICROgram(s)/kG/Min (12.1 mL/Hr) IV Continuous <Continuous>  polyethylene glycol 3350 17 Gram(s) Oral daily  senna Syrup 5 milliLiter(s) Oral daily  sodium chloride 3%  Inhalation 4 milliLiter(s) Inhalation every 6 hours    MEDICATIONS  (PRN):  acetaminophen   Tablet .. 650 milliGRAM(s) Oral every 6 hours PRN Temp greater or equal to 38C (100.4F)  fentaNYL    Injectable 25 MICROGram(s) IV Push every 8 hours PRN Severe Pain (7 - 10)  sodium chloride 0.9% lock flush 10 milliLiter(s) IV Push every 1 hour PRN Pre/post blood products, medications, blood draw, and to maintain line patency      09-14-21 @ 07:01  -  09-15-21 @ 07:00  --------------------------------------------------------  IN: 1290.5 mL / OUT: 1255 mL / NET: 35.5 mL    09-15-21 @ 07:01  -  09-15-21 @ 11:22  --------------------------------------------------------  IN: 48.8 mL / OUT: 10 mL / NET: 38.8 mL      PHYSICAL EXAM:      T(C): 38.4 (09-15-21 @ 09:00), Max: 38.8 (09-15-21 @ 03:00)  HR: 101 (09-15-21 @ 10:30) (92 - 127)  BP: 92/48 (09-15-21 @ 10:30) (78/43 - 127/62)  RR: 23 (09-15-21 @ 10:30) (17 - 27)  SpO2: 100% (09-15-21 @ 10:30) (92% - 100%)  Wt(kg): --  Lungs clear  Heart S1S2  Abd soft NT ND  Extremities:   2-3 +edema                                    6.6    9.17  )-----------( 150      ( 15 Sep 2021 07:33 )             20.4     09-15    137  |  105  |  61<H>  ----------------------------<  142<H>  4.0   |  25  |  2.85<H>    Ca    7.9<L>      15 Sep 2021 04:37  Phos  3.7     09-15  Mg     2.3     09-15    TPro  4.6<L>  /  Alb  0.9<L>  /  TBili  6.9<H>  /  DBili  x   /  AST  147<H>  /  ALT  87<H>  /  AlkPhos  1085<H>  09-15      LIVER FUNCTIONS - ( 15 Sep 2021 04:37 )  Alb: 0.9 g/dL / Pro: 4.6 gm/dL / ALK PHOS: 1085 U/L / ALT: 87 U/L / AST: 147 U/L / GGT: x           Creatinine Trend: 2.85<--, 4.37<--, 3.60<--, 2.68<--, 2.44<--, 3.38<--  Mode: AC/ CMV (Assist Control/ Continuous Mandatory Ventilation)  RR (machine): 18  TV (machine): 500  FiO2: 30  PEEP: 5  ITime: 0.8  MAP: 5  PIP: 12        Trend Bun/Cr  09-15-21 @ 04:37  BUN/CR -  61<H> / 2.85<H>  09-14-21 @ 02:32  BUN/CR -  110<H> / 4.37<H>  09-13-21 @ 04:21  BUN/CR -  94<H> / 3.60<H>  09-12-21 @ 04:40  BUN/CR -  71<H> / 2.68<H>  09-11-21 @ 03:34  BUN/CR -  80<H> / 2.44<H>  09-10-21 @ 04:17  BUN/CR -  122<H> / 3.38<H>  09-09-21 @ 02:30  BUN/CR -  159<H> / 4.11<H>  09-08-21 @ 02:00  BUN/CR -  145<H> / 3.71<H>  09-07-21 @ 02:18  BUN/CR -  110<H> / 3.21<H>  09-06-21 @ 00:22  BUN/CR -  89<H> / 3.44<H>  09-05-21 @ 04:39  BUN/CR -  80<H> / 3.55<H>  09-04-21 @ 04:26  BUN/CR -  66<H> / 3.29<H>    LAUREN CKD 4; respiratory failure;   Now HD dependent   Tachycardia, hypotension improved   Increasing edema         Plan:  Trial IV diuresis, follow UO trend   Will arrange for HD tomorrow  Prognosis guarded      Jimbo Hutchinson MD

## 2021-09-15 NOTE — PROGRESS NOTE ADULT - SUBJECTIVE AND OBJECTIVE BOX
Patient is a 81y old  Male who presents with a chief complaint of Right hip fracture    INTERVAL HPI/OVERNIGHT EVENTS:  remains intubated, febrile    MEDICATIONS  (STANDING):  ALBUTerol    0.083% 2.5 milliGRAM(s) Nebulizer every 6 hours  aMIOdarone    Tablet 200 milliGRAM(s) Oral daily  chlorhexidine 0.12% Liquid 15 milliLiter(s) Oral Mucosa every 12 hours  chlorhexidine 2% Cloths 1 Application(s) Topical <User Schedule>  dextrose 40% Gel 15 Gram(s) Oral once  dextrose 5%. 1000 milliLiter(s) (50 mL/Hr) IV Continuous <Continuous>  dextrose 5%. 1000 milliLiter(s) (100 mL/Hr) IV Continuous <Continuous>  dextrose 50% Injectable 25 Gram(s) IV Push once  dextrose 50% Injectable 12.5 Gram(s) IV Push once  dextrose 50% Injectable 25 Gram(s) IV Push once  glucagon  Injectable 1 milliGRAM(s) IntraMuscular once  insulin lispro (ADMELOG) corrective regimen sliding scale   SubCutaneous every 6 hours  midodrine 5 milliGRAM(s) Oral every 8 hours  pantoprazole   Suspension 40 milliGRAM(s) Oral daily  petrolatum white Ointment 1 Application(s) Topical every 12 hours  phenylephrine    Infusion 0.5 MICROgram(s)/kG/Min (12.1 mL/Hr) IV Continuous <Continuous>  polyethylene glycol 3350 17 Gram(s) Oral daily  senna Syrup 5 milliLiter(s) Oral daily  sodium chloride 3%  Inhalation 4 milliLiter(s) Inhalation every 6 hours    MEDICATIONS  (PRN):  acetaminophen   Tablet .. 650 milliGRAM(s) Oral every 6 hours PRN Temp greater or equal to 38C (100.4F)  fentaNYL    Injectable 25 MICROGram(s) IV Push every 8 hours PRN Severe Pain (7 - 10)  sodium chloride 0.9% lock flush 10 milliLiter(s) IV Push every 1 hour PRN Pre/post blood products, medications, blood draw, and to maintain line patency    Allergies:   No Known Allergies    REVIEW OF SYSTEMS:  All other systems reviewed and are negative    Vital Signs Last 24 Hrs  T(C): 38.4 (15 Sep 2021 09:00), Max: 38.8 (15 Sep 2021 03:00)  T(F): 101.1 (15 Sep 2021 09:00), Max: 101.9 (15 Sep 2021 03:00)  HR: 103 (15 Sep 2021 10:00) (92 - 127)  BP: 109/82 (15 Sep 2021 09:30) (78/43 - 127/62)  BP(mean): 89 (15 Sep 2021 09:30) (55 - 96)  RR: 21 (15 Sep 2021 10:00) (17 - 27)  SpO2: 100% (15 Sep 2021 10:00) (92% - 100%)    CAPILLARY BLOOD GLUCOSE    POCT Blood Glucose.: 149 mg/dL (15 Sep 2021 06:03)  POCT Blood Glucose.: 127 mg/dL (14 Sep 2021 22:06)  POCT Blood Glucose.: 98 mg/dL (14 Sep 2021 17:07)  POCT Blood Glucose.: 156 mg/dL (14 Sep 2021 11:18)    PHYSICAL EXAM:  GENERAL: Intubated  HEAD:  Atraumatic, Normocephalic  EYES: PERRLA, conjunctiva and sclera clear  ENMT: Moist mucous membranes  NECK: Supple, No JVD  NERVOUS SYSTEM: Not alert, not oriented  CHEST/LUNG: Clear to auscultation bilaterally  HEART: tachycardic  ABDOMEN: Soft, Nontender, Nondistended; Bowel sounds present  EXTREMITIES: surgical site c/d/i  SKIN: No rashes or lesions    Labs                          6.6    9.17  )-----------( 150      ( 15 Sep 2021 07:33 )             20.4     09-15    137  |  105  |  61<H>  ----------------------------<  142<H>  4.0   |  25  |  2.85<H>    Ca    7.9<L>      15 Sep 2021 04:37  Phos  3.7     09-15  Mg     2.3     09-15    TPro  4.6<L>  /  Alb  0.9<L>  /  TBili  6.9<H>  /  DBili  x   /  AST  147<H>  /  ALT  87<H>  /  AlkPhos  1085<H>  09-15    Culture - Sputum (collected 12 Sep 2021 12:51)  Source: .Sputum Sputum  Gram Stain (12 Sep 2021 22:12):    No polymorphonuclear leukocytes per low power field    No Squamous epithelial cells per low power field    Numerous Gram Negative Rods per oil power field  Final Report (14 Sep 2021 14:07):    Numerous Pseudomonas aeruginosa    Normal Respiratory Prerna absent  Organism: Pseudomonas aeruginosa (14 Sep 2021 14:07)  Organism: Pseudomonas aeruginosa (14 Sep 2021 14:06)      DVT prophylaxis:  SCD's      GOC: remains full code

## 2021-09-15 NOTE — PROGRESS NOTE ADULT - ASSESSMENT
81M PMH orthostatic hypotension chronically on midodrine TID with baseline BP 90s/40s per daughter, CAD s/p CABG >12 years ago, HLD, COPD, CKD 3 (baseline Cr 2.3-2.9), glaucoma with blindness, occasional memory loss/confusion ?dementia presents to the ED on 8/31 s/p mechanical fall. Admitted with an acute, comminuted, displaced right femoral intertrochanteric fracture with noted hematoma throughout the surrounding musculature. s/p RRT 8/31 for hypotension SBP 50s with noted Hb drop 8.1-> 6.9. Given 250mL IVF bolus and 2units pRBC transfusion. Taken to OR night of 9/1 s/p Intramedullary nailing of right femur with reported 100mL blood loss intraoperatively and intraoperative hypotension per ortho. Post op with recurrent hypotension, hypothermia, AMS. Transferred to ICU for hypotension/shock state. Found to be anemic Hb 6.2, lactate 5.2, Cr 2.9, pH 7.2 -> 7.0, HCO3: 10. Urgently intubated with hemodynamic instability and lethargy. Found to have pseudomonas PNA with septic shock. Weaned and extubated 9/7. BP labile- Hypertensive post extubation. Course with a-fib RVR. Epistaxis with aspiration requiring re- intubation 9/9, LAUREN on CKD initiated on HD.     DX: hemorrhagic shock, septic shock, acute on chronic hypotension, acute blood loss anemia, severe metabolic / lactic acidosis, acute metabolic encephalopathy, acute (type 4) respiratory failure requiring intubation, pseudomonas PNA, a-fib RVR, epistaxis with aspiration of blood LAUREN on CKD     NEURO  mental status remains poor overall  daily sedation vacation as tolerates      CV  hemorrhagic shock + septic shock  on phenylephrine for BP support  cont midodrine, wean off IV vasopressor as tolerates  a-fib back in sinus rhythm: cont with amiodarone  not on full dose ac due to recent acute bleeding and anemia requiring prbc transfusions still        PULM   intubated for OR 9/1, extubated post op  reintubated 9/2 with hemodynamic instability, severe acidosis  weaned and extubated 9/7  reintubated 9/9 for epistaxis with aspiration, airway protection  s/p course of zosyn for pseudomonas PNA  failed wean and still with copious secretions  tachypneic with labored breathing on SBT      HEME  acute post procedural blood loss anemia  prbc transfusion as needed  s/p 2 units pRBC today for Hb 6.6  in total has received 9 units pRBC and 2 platelets  goal to keep Hb > 7      RENAL  known CKD now with LAUREN on CKD and volume overload  HD per renal   fluid removal with HD as tolerates  renal follow up appreciated    ID  s/p zosyn for pseudomonas PNA  leukocytosis improved  procal overall is downtrending    GI  had initial noted LFTs and T bili elevation  abdominal US to evaluate GB and liver without evidence of acute sidra  LFTs now trending back up, will monitor      GEN  full code  DVT prophylaxis with bilateral compression devices due to anemia and bleeding  currently is deconditioned and remains with encephalopathy  realistically if family wants to remain aggressive with care pt will likely need trach/peg  will cont discussions with family      Critical Care Time: 40 min

## 2021-09-15 NOTE — PROGRESS NOTE ADULT - SUBJECTIVE AND OBJECTIVE BOX
24 hr events:  s/p 2 units pRBC for anemia today  s/p HD yesterday with 1L removal  vomited feeds today- tube feeds held  still with lots of thick ETT secretions  labored breathing, failed weaning    ## ROS:  [x ] unable to obtain due to intubation/encephalopathy      ## Labs:  CBC:                         6.6    9.17  )-----------( 150      ( 15 Sep 2021 07:33 )             20.4     09-15    137  |  105  |  61<H>  ----------------------------<  142<H>  4.0   |  25  |  2.85<H>    Ca    7.9<L>      15 Sep 2021 04:37  Phos  3.7     09-15  Mg     2.3     09-15    TPro  4.6<L>  /  Alb  0.9<L>  /  TBili  6.9<H>  /  DBili  x   /  AST  147<H>  /  ALT  87<H>  /  AlkPhos  1085<H>  09-15    Procalcitonin, Serum (21 @ 09:26)    Procalcitonin, Serum: 14.30 ng/mL    Procalcitonin, Serum (21 @ 09:34)    Procalcitonin, Serum: 6.26 ng/mL    Culture - Sputum . (21 @ 12:51)    Specimen Source: .Sputum Sputum    Culture Results: Numerous Pseudomonas aeruginosa    Culture - Sputum . (21 @ 00:38)    Specimen Source: .Sputum Sputum    Culture Results: Few Pseudomonas aeruginosa      ## Imaging:  CXR < from: Xray Chest 1 View- PORTABLE-Routine (Xray Chest 1 View- PORTABLE-Routine .) (21 @ 08:03) >  Endotracheal tube to mid trachea. Partial clearing, right base.        ## Medications:  aMIOdarone    Tablet 200 milliGRAM(s) Oral daily  midodrine 5 milliGRAM(s) Oral every 8 hours  phenylephrine    Infusion 0.5 MICROgram(s)/kG/Min IV Continuous <Continuous>    ALBUTerol    0.083% 2.5 milliGRAM(s) Nebulizer every 6 hours  sodium chloride 3%  Inhalation 4 milliLiter(s) Inhalation every 6 hours    dextrose 40% Gel 15 Gram(s) Oral once  dextrose 50% Injectable 25 Gram(s) IV Push once  dextrose 50% Injectable 12.5 Gram(s) IV Push once  dextrose 50% Injectable 25 Gram(s) IV Push once  glucagon  Injectable 1 milliGRAM(s) IntraMuscular once  insulin lispro (ADMELOG) corrective regimen sliding scale   SubCutaneous every 6 hours      pantoprazole   Suspension 40 milliGRAM(s) Oral daily  polyethylene glycol 3350 17 Gram(s) Oral daily  senna Syrup 5 milliLiter(s) Oral daily    acetaminophen   Tablet .. 650 milliGRAM(s) Oral every 6 hours PRN  fentaNYL    Injectable 25 MICROGram(s) IV Push every 8 hours PRN      ## Vitals:  T(C): 38.4 (09-15-21 @ 09:00), Max: 38.8 (09-15-21 @ 03:00)  HR: 101 (09-15-21 @ 10:30) (92 - 127)  BP: 92/48 (09-15-21 @ 10:30) (78/43 - 127/62)  RR: 23 (09-15-21 @ 10:30) (17 - 27)  SpO2: 100% (09-15-21 @ 10:30) (92% - 100%)    Vent: Mode: AC/ CMV (Assist Control/ Continuous Mandatory Ventilation), RR (machine): 18, RR (patient): 20, TV (machine): 500, FiO2: 30, PEEP: 5, PIP: 13  AB-14 @ 07:  -  09-15 @ 07:00  --------------------------------------------------------  IN: 1290.5 mL / OUT: 1255 mL / NET: 35.5 mL    09-15 @ 07:  -  16 @ 03:55  --------------------------------------------------------  IN: 861.7 mL / OUT: 136 mL / NET: 725.7 mL          ## P/E:  Gen: lying comfortably in bed in no apparent distress  HEENT: ETT and NGT in place, R IJ HD catheter  Resp: CTA B/L , mechanical breath sounds  CVS: RRR  Abd: soft ND +BS  Ext: bilateral upper and lower extremity edema, weeping edema b/l UE  Neuro: opens eyes, does not follow commands    CENTRAL LINE: [x ] YES [ ] NO  LOCATION: R IJ   DATE INSERTED:  REMOVE: [ ] YES [ x] NO      SANDERS: [x ] YES [ ] NO    DATE INSERTED:  REMOVE:  [ ] YES [ ] NO      A-LINE:  [ ] YES [x ] NO  LOCATION:   DATE INSERTED:  REMOVE:  [ ] YES [ ] NO  EXPLAIN:    GLOBAL ISSUE/BEST PRACTICE:  Analgesia: n/a  Sedation: yes  HOB elevation: yes  Stress ulcer prophylaxis: protonix  VTE prophylaxis: bilateral compression devices  Oral Care: yes  Glycemic control: yes  Nutrition: tube feeds- held due to emesis    CODE STATUS: [ x] full code  [ ] DNR  [ ] DNI  [ ] MOLST  Goals of care discussion: [x ] yes

## 2021-09-15 NOTE — PROGRESS NOTE ADULT - ASSESSMENT
R hip fracture  POD # 14 s/p R IMN  Management per ortho    Acute respiratory failure  Reintubated 9/9 for airway protection  s/p Zosyn for Pseudomonas in sputum    Shock, septic v hemorrhagic  Acute on Chronic hypotension on midodrine  off antibiotics   consider ID consult for new fever    Acute blood loss anemia  likely from bleeding from fracture  monitor cbc  consider blood transfusion    Thrombocytopenia  stable - transfused plt    Transaminitis  likely due to shock  LFTs improving    LAUREN on CKD stage 4  Hypokalemia  Hypernatremia  likely due to rhabdo v shock  nephrology following  HD per nephrology  TF with free water    Chronic COPD  Not in exacerbation       Poor overall prognosis

## 2021-09-15 NOTE — CHART NOTE - NSCHARTNOTEFT_GEN_A_CORE
Assessment:  Pt seen in ICU, on vent, on OGT feeding.  Pt adm c hip fracture, s/p right IMN, acute respiratory failure, septic shock, acute blood loss anemia, thrombocytopenia, transaminitis, LAUREN, CKD, HD dependent, increasing edema, tacchycardia, hypotension, tube feeding c free water per MD's notes.  PMH include COPD, HTN, CAD, CKD, blindness right eye.    Factors impacting intake: [ ] none [ ] nausea  [ ] vomiting [ ] diarrhea [ ] constipation  [ ]chewing problems [ ] swallowing issues  [x ] other: s/p vomiting after being suctioned for secretions    Diet Prescription: Diet, NPO with Tube Feed:   Tube Feeding Modality: Orogastric  Nepro with Carb Steady  Total Volume for 24 Hours (mL): 720  Continuous  Starting Tube Feed Rate {mL per Hour}: 30  Increase Tube Feed Rate by (mL): 5     Every 12 hours  Until Goal Tube Feed Rate (mL per Hour): 30  Tube Feed Duration (in Hours): 24  Tube Feed Start Time: 17:00  Liquid Protein Supplement     Qty per Day:  1 (09-10-21 @ 15:18)    Intake: Nepro restarted, Nepro 30 ml/hr= 720 ml, 1296 calories, 58 grams protein, 526 ml free water     Current Weight: , 73.3 kg, , 64.7 kg, c wt. gain of 8.6 kg   % Weight Change: 13.3%  3+ generalized edema, 3+ edema of arms and legs noted   I/O: 1291/1255(+36), urine indwellin ml     Pertinent Medications: MEDICATIONS  (STANDING):  ALBUTerol    0.083% 2.5 milliGRAM(s) Nebulizer every 6 hours  aMIOdarone    Tablet 200 milliGRAM(s) Oral daily  chlorhexidine 0.12% Liquid 15 milliLiter(s) Oral Mucosa every 12 hours  chlorhexidine 2% Cloths 1 Application(s) Topical <User Schedule>  dextrose 40% Gel 15 Gram(s) Oral once  dextrose 5%. 1000 milliLiter(s) (50 mL/Hr) IV Continuous <Continuous>  dextrose 5%. 1000 milliLiter(s) (100 mL/Hr) IV Continuous <Continuous>  dextrose 50% Injectable 25 Gram(s) IV Push once  dextrose 50% Injectable 12.5 Gram(s) IV Push once  dextrose 50% Injectable 25 Gram(s) IV Push once  glucagon  Injectable 1 milliGRAM(s) IntraMuscular once  insulin lispro (ADMELOG) corrective regimen sliding scale   SubCutaneous every 6 hours  midodrine 5 milliGRAM(s) Oral every 8 hours  pantoprazole   Suspension 40 milliGRAM(s) Oral daily  petrolatum white Ointment 1 Application(s) Topical every 12 hours  phenylephrine    Infusion 0.5 MICROgram(s)/kG/Min (12.1 mL/Hr) IV Continuous <Continuous>  polyethylene glycol 3350 17 Gram(s) Oral daily  senna Syrup 5 milliLiter(s) Oral daily  sodium chloride 3%  Inhalation 4 milliLiter(s) Inhalation every 6 hours    MEDICATIONS  (PRN):  acetaminophen   Tablet .. 650 milliGRAM(s) Oral every 6 hours PRN Temp greater or equal to 38C (100.4F)  fentaNYL    Injectable 25 MICROGram(s) IV Push every 8 hours PRN Severe Pain (7 - 10)  sodium chloride 0.9% lock flush 10 milliLiter(s) IV Push every 1 hour PRN Pre/post blood products, medications, blood draw, and to maintain line patency    Pertinent Labs: 09-15 Na137 mmol/L Glu 142 mg/dL<H> K+ 4.0 mmol/L Cr  2.85 mg/dL<H> BUN 61 mg/dL<H> 09-15 Phos 3.7 mg/dL 09-15 Alb 0.9 g/dL<L>09-15 ALT 87 U/L<H>  U/L<H> Alkaline Phosphatase 1085 U/L<H>  21 @ 09:34 a1c 5.5   CAPILLARY BLOOD GLUCOSE      POCT Blood Glucose.: 103 mg/dL (15 Sep 2021 13:12)  POCT Blood Glucose.: 149 mg/dL (15 Sep 2021 06:03)  POCT Blood Glucose.: 127 mg/dL (14 Sep 2021 22:06)  POCT Blood Glucose.: 98 mg/dL (14 Sep 2021 17:07)    Skin: WDL except ecchymotic     Estimated Needs:   [x ] no change since previous assessment (09/10)  [ ] recalculated:     Previous Nutrition Diagnosis:   [x ] Malnutrition; severe malnutrition in context of acute illness     Related to: inadequate protein-energy intake in setting of right hip fracture, shock, LAUREN on CKD on HD    As evidenced by: <50% nutrition needs > 5 days, physical findings of moderate muscle/fat loss     Goal: pt to meet >75% protein-energy needs via tolerated route; not met consistently       Nutrition Diagnosis is [ x] ongoing  [ ] resolved [ ] not applicable       New Nutrition Diagnosis: [ ] not applicable     Interventions:   Recommend  [ ] Change Diet To:  [ ] Nutrition Supplement  [ x] Nutrition Support; if enteral feeding is tolerated, advance Nepro from 30 ml/hr by 5 ml increments q 12 hours to goal rate 40 mL/hr x 24 hrs (1728 nadja, 78 gm pro, 701 mL free water, 101% RDIs)   [x ] Other: 200 ml water flush q 8 hours     Monitoring and Evaluation:   [ ] PO intake [ x ] Tolerance to diet prescription [ x ] weights [ x ] labs[ x ] follow up per protocol  [ ] other: Assessment:  Pt seen in ICU, on vent, on OGT feeding.  Pt adm c hip fracture, s/p right IMN, acute respiratory failure, septic shock, acute blood loss anemia, thrombocytopenia, transaminitis, LAUREN, CKD, HD dependent, increasing edema, tacchycardia, hypotension, tube feeding c free water per MD's notes.  PMH include COPD, HTN, CAD, CKD, blindness right eye.    Factors impacting intake: [ ] none [ ] nausea  [ ] vomiting [ ] diarrhea [ ] constipation  [ ]chewing problems [ ] swallowing issues  [x ] other: s/p vomiting after being suctioned for secretions    Diet Prescription: Diet, NPO with Tube Feed:   Tube Feeding Modality: Orogastric  Nepro with Carb Steady  Total Volume for 24 Hours (mL): 720  Continuous  Starting Tube Feed Rate {mL per Hour}: 30  Increase Tube Feed Rate by (mL): 5     Every 12 hours  Until Goal Tube Feed Rate (mL per Hour): 30  Tube Feed Duration (in Hours): 24  Tube Feed Start Time: 17:00  Liquid Protein Supplement     Qty per Day:  1 (09-10-21 @ 15:18)    Intake: Nepro restarted, Nepro 30 ml/hr= 720 ml, 1296 calories, 58 grams protein, 526 ml free water     Current Weight: , 73.3 kg, , 64.7 kg, c wt. gain of 8.6 kg   % Weight Change: 13.3%  3+ generalized edema, 3+ edema of arms and legs noted   I/O: 1291/1255(+36), urine indwellin ml     Pertinent Medications: MEDICATIONS  (STANDING):  ALBUTerol    0.083% 2.5 milliGRAM(s) Nebulizer every 6 hours  aMIOdarone    Tablet 200 milliGRAM(s) Oral daily  chlorhexidine 0.12% Liquid 15 milliLiter(s) Oral Mucosa every 12 hours  chlorhexidine 2% Cloths 1 Application(s) Topical <User Schedule>  dextrose 40% Gel 15 Gram(s) Oral once  dextrose 5%. 1000 milliLiter(s) (50 mL/Hr) IV Continuous <Continuous>  dextrose 5%. 1000 milliLiter(s) (100 mL/Hr) IV Continuous <Continuous>  dextrose 50% Injectable 25 Gram(s) IV Push once  dextrose 50% Injectable 12.5 Gram(s) IV Push once  dextrose 50% Injectable 25 Gram(s) IV Push once  glucagon  Injectable 1 milliGRAM(s) IntraMuscular once  insulin lispro (ADMELOG) corrective regimen sliding scale   SubCutaneous every 6 hours  midodrine 5 milliGRAM(s) Oral every 8 hours  pantoprazole   Suspension 40 milliGRAM(s) Oral daily  petrolatum white Ointment 1 Application(s) Topical every 12 hours  phenylephrine    Infusion 0.5 MICROgram(s)/kG/Min (12.1 mL/Hr) IV Continuous <Continuous>  polyethylene glycol 3350 17 Gram(s) Oral daily  senna Syrup 5 milliLiter(s) Oral daily  sodium chloride 3%  Inhalation 4 milliLiter(s) Inhalation every 6 hours    MEDICATIONS  (PRN):  acetaminophen   Tablet .. 650 milliGRAM(s) Oral every 6 hours PRN Temp greater or equal to 38C (100.4F)  fentaNYL    Injectable 25 MICROGram(s) IV Push every 8 hours PRN Severe Pain (7 - 10)  sodium chloride 0.9% lock flush 10 milliLiter(s) IV Push every 1 hour PRN Pre/post blood products, medications, blood draw, and to maintain line patency    Pertinent Labs: 09-15 Na137 mmol/L Glu 142 mg/dL<H> K+ 4.0 mmol/L Cr  2.85 mg/dL<H> BUN 61 mg/dL<H> 09-15 Phos 3.7 mg/dL 09-15 Alb 0.9 g/dL<L>09-15 ALT 87 U/L<H>  U/L<H> Alkaline Phosphatase 1085 U/L<H>  21 @ 09:34 a1c 5.5   CAPILLARY BLOOD GLUCOSE      POCT Blood Glucose.: 103 mg/dL (15 Sep 2021 13:12)  POCT Blood Glucose.: 149 mg/dL (15 Sep 2021 06:03)  POCT Blood Glucose.: 127 mg/dL (14 Sep 2021 22:06)  POCT Blood Glucose.: 98 mg/dL (14 Sep 2021 17:07)    Skin: WDL except ecchymotic     Estimated Needs:   [x ] no change since previous assessment (09/10)  [ ] recalculated:     Previous Nutrition Diagnosis: (09/10)  [x ] Malnutrition; severe malnutrition in context of acute illness     Related to: inadequate protein-energy intake in setting of right hip fracture, shock, LAUREN on CKD on HD    As evidenced by: <50% nutrition needs > 5 days, physical findings of moderate muscle/fat loss     Goal: pt to meet >75% protein-energy needs via tolerated route->OGT at present;  not met consistently       Nutrition Diagnosis is [ x] ongoing  [ ] resolved [ ] not applicable       New Nutrition Diagnosis: [ ] not applicable     Interventions:   Recommend  [ ] Change Diet To:  [ ] Nutrition Supplement  [ x] Nutrition Support; if enteral feeding is tolerated, advance Nepro from 30 ml/hr by 5 ml increments q 12 hours to goal rate 40 mL/hr x 24 hrs (1728 nadja, 78 gm pro, 701 mL free water, 101% RDIs)   [x ] Other: 200 ml water flush q 8 hours     Monitoring and Evaluation:   [ ] PO intake [ x ] Tolerance to diet prescription [ x ] weights [ x ] labs[ x ] follow up per protocol  [ ] other:

## 2021-09-15 NOTE — PROVIDER CONTACT NOTE (CRITICAL VALUE NOTIFICATION) - TEST AND RESULT REPORTED:
Lactate 5.7
Trop .0285 lactate 5.0  CPK 1030
Hgh 6.2 Hct 18.5
Lactate 9.3
Hgb - 7.1  Hct - 20.7
H/h 6.7 and 20.7
Hem 6.6 hct 20.4
H+H 6.9, 21.1
h/h 7.2hct 20.9
lactate 4.5

## 2021-09-16 NOTE — CONSULT NOTE ADULT - ASSESSMENT
81M Admitted S/P Fall with an acute, comminuted, displaced right femoral intertrochanteric fracture.  He was taken to OR by Orthopedics on  9/1 s/p Intramedullary nailing of right femur. Post op with recurrent hypotension, hypothermia, AMS. Transferred to ICU for hypotension/ hemorrhagic shock where he was intubated.   Has failed multiple weaning trials including episode of Epistaxis concerning for aspiration  PMH of CKD, orthostatic hypotension, CABG more than 12 years ago without issue, HLD, COPD, occasional memory loss/confusion     Surgical Team called for prolonged intubation and Dysphagia, Currently hypotensive, tachycardic requiring pressor support Post dialysis. PEEP 5, FIO2 30    - Keep NPO/hold Feedings after midnight, will reevaluate in am for possible Tracheostomy and PEG tube placement if Optimized and off pressor report  Discussed with Dr Cheney

## 2021-09-16 NOTE — PROGRESS NOTE ADULT - CONVERSATION DETAILS
Spoke to daughter over the phone today regarding pt's condition, clinical progress, overall deconditioned and debilitated state    Pt has been intubated 3 times over a course of a week and has had a bout of PNA, septic and hemorrhagic shock, acute on chronic renal failure now on HD.     His weaning has not been consistent and at the present time with decreased mental status concerns for his ability to protect his airway remains.    overall goals of care discussed with daughter. we discussed intubation/peg nursing facility vs the option of comfort measures.    daughter would like all care to remain aggressive and wants trach/peg if pt is still dependent on mechanical ventilation.

## 2021-09-16 NOTE — PROGRESS NOTE ADULT - ASSESSMENT
81M PMH orthostatic hypotension chronically on midodrine TID with baseline BP 90s/40s per daughter, CAD s/p CABG >12 years ago, HLD, COPD, CKD 3 (baseline Cr 2.3-2.9), glaucoma with blindness, occasional memory loss/confusion ?dementia presents to the ED on 8/31 s/p mechanical fall. Admitted with an acute, comminuted, displaced right femoral intertrochanteric fracture with noted hematoma throughout the surrounding musculature. s/p RRT 8/31 for hypotension SBP 50s with noted Hb drop 8.1-> 6.9. Given 250mL IVF bolus and 2units pRBC transfusion. Taken to OR night of 9/1 s/p Intramedullary nailing of right femur with reported 100mL blood loss intraoperatively and intraoperative hypotension per ortho. Post op with recurrent hypotension, hypothermia, AMS. Transferred to ICU for hypotension/shock state. Found to be anemic Hb 6.2, lactate 5.2, Cr 2.9, pH 7.2 -> 7.0, HCO3: 10. Urgently intubated with hemodynamic instability and lethargy. Found to have pseudomonas PNA with septic shock. Weaned and extubated 9/7. BP labile- Hypertensive post extubation. Course with a-fib RVR. Epistaxis with aspiration requiring re- intubation 9/9, LAUREN on CKD initiated on HD.     DX: hemorrhagic shock, septic shock, acute on chronic hypotension, acute blood loss anemia, severe metabolic / lactic acidosis, acute metabolic encephalopathy, acute (type 4) respiratory failure requiring intubation, pseudomonas PNA, a-fib RVR, epistaxis with aspiration of blood LAUREN on CKD     NEURO  mental status remains poor overall but slightly better today as he was more responsive  off sedation      CV  hemorrhagic shock + septic shock  on phenylephrine for BP support  cont midodrine, will icrease midodrine dose to 10mg q8 hrs  wean off IV vasopressor as tolerates  a-fib now back in sinus rhythm: cont with amiodarone  not on full dose ac due to recent acute bleeding and anemia requiring prbc transfusions        PULM   intubated for OR 9/1, extubated post op  reintubated 9/2 with hemodynamic instability, severe acidosis  weaned and extubated 9/7  reintubated 9/9 for epistaxis with aspiration, airway protection  s/p course of zosyn for pseudomonas PNA  repeat sputum cx from 9/12 still with pseudomonas after treatment with zosyn ?colonization at this time  failed wean yesterday but tolerating some weaning today, still with copious secretions  tachypneic with labored breathing on SBT at times      HEME  acute post procedural blood loss anemia on chronic anemia  prbc transfusion as needed  s/p 2 units pRBC 9/15 for Hb 6.6  in total has received 9 units pRBC and 2 platelets  goal to keep Hb > 7      RENAL  known CKD now with LAUREN on CKD and volume overload  HD per renal   fluid removal with HD as tolerates  did not tolerate HD today due to hypotension  renal follow up appreciated    ID  s/p zosyn for pseudomonas PNA  leukocytosis improved  procal overall is downtrending    GI  had initial noted LFTs and T bili elevation  initial abdominal US to evaluate GB and liver without evidence of acute sidra  LFTs now trending back up, repeat abdominal US without GB pathology: likely due to intermittent episodes of hypotension with ischemic injury?      GEN  full code  DVT prophylaxis with bilateral compression devices due to anemia and bleeding  currently is very deconditioned and remains with encephalopathy  family wants to remain aggressive with care and in agreement for trach/peg  will get surgical consult     Critical Care Time: 50 min

## 2021-09-16 NOTE — PROGRESS NOTE ADULT - SUBJECTIVE AND OBJECTIVE BOX
NEPHROLOGY PROGRESS NOTE    CHIEF COMPLAINT:  LAUREN    HPI:  Vented...BP 90's on Wali 0.11, urine output poor.      EXAM:  T(F): 98.3 (09-16-21 @ 07:00)  HR: 99 (09-16-21 @ 12:22)  BP: 97/45 (09-16-21 @ 11:00)  RR: 22 (09-16-21 @ 11:00)  SpO2: 100% (09-16-21 @ 12:22)    Poorly responsive  Normal respiratory effort, lungs clear bilaterally  Heart RRR with no murmur, 1+ peripheral edema         LABS                             9.6    11.82 )-----------( 122      ( 16 Sep 2021 02:20 )             28.7          09-16    137  |  103  |  70<H>  ----------------------------<  121<H>  4.0   |  23  |  3.49<H>    Ca    8.3<L>      16 Sep 2021 02:20  Phos  4.9     09-16  Mg     2.4     09-16    TPro  4.8<L>  /  Alb  0.9<L>  /  TBili  9.2<H>  /  DBili  x   /  AST  126<H>  /  ALT  73  /  AlkPhos  1033<H>  09-16           ASSESSMENT  LAUREN on CKD(4) - oliguric  Respiratory failure  Hypotension on low dose single pressor    PLAN  Hemodialysis ordered today with fluid removal as permitted by BP

## 2021-09-16 NOTE — PROGRESS NOTE ADULT - SUBJECTIVE AND OBJECTIVE BOX
24 hr events:  s/p 2 units pRBC yesterday for Hb 6.6  remains on phenylephrine and midodrine for BP support  did not tolerate HD session today due to hypotension  remains lethargic despite being off sedation    ## ROS:  [ x] unable to obtain due to intubation      ## Labs:  CBC:                        9.6    11.82 )-----------( 122      ( 16 Sep 2021 02:20 )             28.7     09-16    137  |  103  |  70<H>  ----------------------------<  121<H>  4.0   |  23  |  3.49<H>    Ca    8.3<L>      16 Sep 2021 02:20  Phos  4.9     09-16  Mg     2.4     09-16    TPro  4.8<L>  /  Alb  0.9<L>  /  TBili  9.2<H>  /  DBili  x   /  AST  126<H>  /  ALT  73  /  AlkPhos  1033<H>  09-16    Culture - Sputum . (09.12.21 @ 12:51)    Specimen Source: .Sputum Sputum    Culture Results: Numerous Pseudomonas aeruginosa      ## Imaging:  abdominal US < from: US Abdomen Limited (09.16.21 @ 11:37) >  Liver: Coarsened echotexture.  Bile ducts: Normal caliber. Common bile duct measures 5 mm.  Gallbladder: Within normal limits.  Pancreas: Visualized portions are within normal limits.  Right kidney: 10.9 cm. No hydronephrosis. Multiple cysts, largest 4.2 cm.  Ascites: None.  IVC: Visualized portions are within normal limits.    IMPRESSION:    No cholelithiasis or biliary ductal dilatation.      ## Medications:    aMIOdarone    Tablet 200 milliGRAM(s) Oral daily  midodrine 10 milliGRAM(s) Oral every 8 hours  phenylephrine    Infusion 0.5 MICROgram(s)/kG/Min IV Continuous <Continuous>      dextrose 40% Gel 15 Gram(s) Oral once  dextrose 50% Injectable 25 Gram(s) IV Push once  dextrose 50% Injectable 12.5 Gram(s) IV Push once  dextrose 50% Injectable 25 Gram(s) IV Push once  glucagon  Injectable 1 milliGRAM(s) IntraMuscular once  insulin lispro (ADMELOG) corrective regimen sliding scale   SubCutaneous every 6 hours      pantoprazole   Suspension 40 milliGRAM(s) Oral daily  polyethylene glycol 3350 17 Gram(s) Oral daily  senna Syrup 5 milliLiter(s) Oral daily    acetaminophen   Tablet .. 650 milliGRAM(s) Oral every 6 hours PRN  fentaNYL    Injectable 25 MICROGram(s) IV Push every 8 hours PRN      ## Vitals:  T(C): 37.3 (16 Sep 2021 17:13), Max: 37.4 (16 Sep 2021 04:00)  T(F): 99.2 (16 Sep 2021 17:13), Max: 99.3 (16 Sep 2021 04:00)  HR: 88 (16 Sep 2021 16:30) (85 - 127)  BP: 120/56 (16 Sep 2021 15:00) (71/40 - 154/69)  BP(mean): 72 (16 Sep 2021 15:00) (60 - 87)  RR: 21 (16 Sep 2021 15:00) (17 - 26)  SpO2: 100% (16 Sep 2021 16:30) (99% - 100%)    Vent: Mode: AC/ CMV (Assist Control/ Continuous Mandatory Ventilation), RR (machine): 18, RR (patient): 22, TV (machine): 500, FiO2: 30, PEEP: 5, PIP: 12      ## P/E:  Gen: lying comfortably in bed in no apparent distress, generalzed anasarca, generalized weakness  HEENT: ETT and NGT in place  Resp: scattered rhonchi  CVS: RRR  Abd: soft NT/ND +BS  Ext: generalized weeping edema bilateral UE, bl LE pitting edema  Neuro: lethargic but arousable, attempting to follow commands    CENTRAL LINE: [x ] YES [ ] NO  LOCATION:   DATE INSERTED:  REMOVE: [ ] YES [x ] NO      SANDERS: [x ] YES [ ] NO    DATE INSERTED:  REMOVE:  [ x] YES [ ] NO      A-LINE:  [ ] x ] NO  LOCATION:   DATE INSERTED:  REMOVE:  [ ] YES [ ] NO  EXPLAIN:    GLOBAL ISSUE/BEST PRACTICE:  Analgesia: n/a  Sedation: n/a  HOB elevation: yes  Stress ulcer prophylaxis: protonix  VTE prophylaxis: bilateral compression devices  Oral Care: yes  Glycemic control: yes  Nutrition: tube feeds    CODE STATUS: [x ] full code  [ ] DNR  [ ] DNI  [ ] MOLST  Goals of care [x]

## 2021-09-16 NOTE — CONSULT NOTE ADULT - SUBJECTIVE AND OBJECTIVE BOX
Chief Complaint:  Patient is a 81y old  Male who presents with a chief complaint of Right hip fracture (16 Sep 2021 12:27)      HPI:  82 yo M community ambulator w/PMH of CKD, orthostatic hypotension, CABG more than 12 years ago without issue, HLD, COPD, occasional memory loss/confusion presents to the ED s/p fall. Per daughter pt at baseline, AAOx2 at baseline. Daughter is translating for pt and reports pt got his foot got stuck and fell. Fall Witnessed by grandson. No head-strike or LOC. Pt on ASA 81mg. Denies AC use. Denies fever/chills, cough, CP, SOB, N/V, abdominal pain or headache. Pt unable to ambulate. Pt main complaint is right hip pain     PMH/PSH:PAST MEDICAL & SURGICAL HISTORY:  Glaucoma    Blindness of right eye    CAD (coronary artery disease)    HTN (hypertension)    Chronic kidney disease (CKD)    COPD without exacerbation    H/O coronary artery bypass surgery        Allergies:  No Known Allergies      Medications:  acetaminophen   Tablet .. 650 milliGRAM(s) Oral every 6 hours PRN  aMIOdarone    Tablet 200 milliGRAM(s) Oral daily  chlorhexidine 0.12% Liquid 15 milliLiter(s) Oral Mucosa every 12 hours  chlorhexidine 2% Cloths 1 Application(s) Topical <User Schedule>  dextrose 40% Gel 15 Gram(s) Oral once  dextrose 5%. 1000 milliLiter(s) IV Continuous <Continuous>  dextrose 5%. 1000 milliLiter(s) IV Continuous <Continuous>  dextrose 50% Injectable 25 Gram(s) IV Push once  dextrose 50% Injectable 12.5 Gram(s) IV Push once  dextrose 50% Injectable 25 Gram(s) IV Push once  fentaNYL    Injectable 25 MICROGram(s) IV Push every 8 hours PRN  glucagon  Injectable 1 milliGRAM(s) IntraMuscular once  insulin lispro (ADMELOG) corrective regimen sliding scale   SubCutaneous every 6 hours  midodrine 10 milliGRAM(s) Oral every 8 hours  pantoprazole   Suspension 40 milliGRAM(s) Oral daily  petrolatum white Ointment 1 Application(s) Topical every 12 hours  phenylephrine    Infusion 0.5 MICROgram(s)/kG/Min IV Continuous <Continuous>  polyethylene glycol 3350 17 Gram(s) Oral daily  senna Syrup 5 milliLiter(s) Oral daily  sodium chloride 0.9% lock flush 10 milliLiter(s) IV Push every 1 hour PRN      REVIEW OF SYSTEMS:  All other review of systems is negative unless indicated above.    Relevant Family History:   FAMILY HISTORY:  No pertinent family history in first degree relatives        Relevant Social History:  Denies ETOH or tobacco history    Physical Exam:    Vital Signs:  Vital Signs Last 24 Hrs  T(C): 37.3 (16 Sep 2021 17:13), Max: 37.4 (16 Sep 2021 04:00)  T(F): 99.2 (16 Sep 2021 17:13), Max: 99.3 (16 Sep 2021 04:00)  HR: 88 (16 Sep 2021 16:30) (85 - 127)  BP: 120/56 (16 Sep 2021 15:00) (71/40 - 154/69)  BP(mean): 72 (16 Sep 2021 15:00) (60 - 87)  RR: 21 (16 Sep 2021 15:00) (17 - 26)  SpO2: 100% (16 Sep 2021 16:30) (99% - 100%)  Daily     Daily Weight in k.1 (16 Sep 2021 14:25)    Constitutional: ill appearing  HEENT: NC/AT, PERRL, EOMI, anicteric sclera, no nasal discharge; uvula midline, Oral GT, orally intubated  Neck: supple; no JVD or thyromegaly  Respiratory: Coarse BS STEPHANI on mech vent support  Cardiac: +S1/S2; Tachycardic  Gastrointestinal: soft, NT/ND; no rebound or guarding; +BS   Extremities: , no clubbing or cyanosis; no peripheral edema  Musculoskeletal:  no joint swelling, tenderness or erythema  Vascular: 2+ radial, femoral, DP/PT pulses B/L  Skin: warm, dry and intact; no rashes, wounds, or scars  Neurologic: Sedated    Laboratory:                          9.6    11.82 )-----------( 122      ( 16 Sep 2021 02:20 )             28.7     09-16    137  |  103  |  70<H>  ----------------------------<  121<H>  4.0   |  23  |  3.49<H>    Ca    8.3<L>      16 Sep 2021 02:20  Phos  4.9     09-16  Mg     2.4     09-16    TPro  4.8<L>  /  Alb  0.9<L>  /  TBili  9.2<H>  /  DBili  x   /  AST  126<H>  /  ALT  73  /  AlkPhos  1033<H>      LIVER FUNCTIONS - ( 16 Sep 2021 02:20 )  Alb: 0.9 g/dL / Pro: 4.8 gm/dL / ALK PHOS: 1033 U/L / ALT: 73 U/L / AST: 126 U/L / GGT: x                   Intake and Output    09-15-21 @ 07:  -  21 @ 07:00  --------------------------------------------------------  IN: 1401.8 mL / OUT: 211 mL / NET: 1190.8 mL    21 @ 07:01  -  21 @ 17:29  --------------------------------------------------------  IN: 94.8 mL / OUT: 35 mL / NET: 59.8 mL

## 2021-09-17 NOTE — PROGRESS NOTE ADULT - SUBJECTIVE AND OBJECTIVE BOX
INTERVAL HPI/OVERNIGHT EVENTS:   HPI:  80 yo M community ambulator w/PMH of CKD, orthostatic hypotension, CABG more than 12 years ago without issue, HLD, COPD, occasional memory loss/confusion presents to the ED s/p fall. Per daughter pt at baseline, AAOx2 at baseline. Daughter is translating for pt and reports pt got his foot got stuck and fell. Fall Witnessed by grandson. No head-strike or LOC. Pt on ASA 81mg. Denies AC use. Denies fever/chills, cough, CP, SOB, N/V, abdominal pain or headache. Pt unable to ambulate. Pt main complaint is right hip pain     	No fever/chills, No photophobia/eye pain/changes in vision, No ear pain/sore throat/dysphagia, No chest pain/palpitations, no SOB/cough/wheeze/stridor, No abdominal pain, No N/V/D, no dysuria/frequency/discharge, No neck/back pain, no rash, no changes in neurological status/function.  (31 Aug 2021 17:35)         PAST MEDICAL & SURGICAL HISTORY:  Glaucoma    Blindness of right eye    CAD (coronary artery disease)    HTN (hypertension)    Chronic kidney disease (CKD)    COPD without exacerbation    H/O coronary artery bypass surgery        R     ICU Vital Signs Last 24 Hrs  T(C): 37.5 (17 Sep 2021 08:00), Max: 37.9 (17 Sep 2021 05:00)  T(F): 99.5 (17 Sep 2021 08:00), Max: 100.2 (17 Sep 2021 05:00)  HR: 87 (17 Sep 2021 09:45) (86 - 127)  BP: 95/45 (17 Sep 2021 09:00) (71/40 - 124/52)  BP(mean): 60 (17 Sep 2021 09:00) (57 - 102)  ABP: --  ABP(mean): --  RR: 19 (17 Sep 2021 09:00) (17 - 26)  SpO2: 100% (17 Sep 2021 09:45) (99% - 100%)          I&O's Detail    16 Sep 2021 07:01  -  17 Sep 2021 07:00  --------------------------------------------------------  IN:    Nepro with Carb Steady: 510 mL    Phenylephrine: 155 mL  Total IN: 665 mL    OUT:    Indwelling Catheter - Urethral (mL): 35 mL  Total OUT: 35 mL    Total NET: 630 mL      17 Sep 2021 07:01  -  17 Sep 2021 10:36  --------------------------------------------------------  IN:    Phenylephrine: 20.4 mL  Total IN: 20.4 mL    OUT:  Total OUT: 0 mL    Total NET: 20.4 mL          Mode: CPAP with PS  FiO2: 30  PEEP: 5  PS: 5  MAP: 7    CAPILLARY BLOOD GLUCOSE      POCT Blood Glucose.: 83 mg/dL (17 Sep 2021 05:18)  POCT Blood Glucose.: 104 mg/dL (17 Sep 2021 00:32)  POCT Blood Glucose.: 104 mg/dL (16 Sep 2021 17:23)  POCT Blood Glucose.: 123 mg/dL (16 Sep 2021 11:23)    PHYSICAL EXAM:    GENERAL: intaubed, sedated cachectic  Lungs b/l air entry  CVs S1 S2  ABD NT/ND  ext no edema    LABS:                        9.2    13.02 )-----------( 127      ( 17 Sep 2021 02:56 )             27.2      09-17    136  |  102  |  79<H>  ----------------------------<  102<H>  3.8   |  23  |  3.71<H>    Ca    8.1<L>      17 Sep 2021 02:56  Phos  4.7     09-17  Mg     2.4     09-17    TPro  4.6<L>  /  Alb  0.8<L>  /  TBili  9.7<H>  /  DBili  x   /  AST  121<H>  /  ALT  62  /  AlkPhos  989<H>  09-17            RADIOLOGY & ADDITIONAL STUDIES:      Assessment and Plan:    CRITICAL CARE TIME SPENT:

## 2021-09-17 NOTE — PROGRESS NOTE ADULT - SUBJECTIVE AND OBJECTIVE BOX
Spoke at length with patient's daughter who is the health care proxy regarding scheduled trach and peg procedure today.  Risks, benefits, and alternatives discussed.  Daughter is refusing to proceed with surgery at this time.  Discussed with both Dr. Cheney and ICU attending covering today.  Suggest family meeting in near future to discuss ultimate goals and expectations. Please re-consult if necessary.

## 2021-09-17 NOTE — PROGRESS NOTE ADULT - SUBJECTIVE AND OBJECTIVE BOX
[facility] NEPHROLOGY SERVICES, [facility]  NEPHROLOGY AND HYPERTENSION  [address]  [address]  [place] [address]  [phone]    MD ELSA SANTA MD ANDREY GONCHARUK, MD MADHU KORRAPATI, MD YELENA ROSENBERG, MD BINNY KOSHY, MD CHRISTOPHER CAPUTO, MD EDWARD BOVER, MD          Patient events noted    MEDICATIONS  (STANDING):  aMIOdarone    Tablet 200 milliGRAM(s) Oral daily  chlorhexidine 0.12% Liquid 15 milliLiter(s) Oral Mucosa every 12 hours  chlorhexidine 2% Cloths 1 Application(s) Topical <User Schedule>  dextrose 40% Gel 15 Gram(s) Oral once  dextrose 5%. 1000 milliLiter(s) (50 mL/Hr) IV Continuous <Continuous>  dextrose 5%. 1000 milliLiter(s) (100 mL/Hr) IV Continuous <Continuous>  dextrose 50% Injectable 25 Gram(s) IV Push once  dextrose 50% Injectable 12.5 Gram(s) IV Push once  dextrose 50% Injectable 25 Gram(s) IV Push once  glucagon  Injectable 1 milliGRAM(s) IntraMuscular once  insulin lispro (ADMELOG) corrective regimen sliding scale   SubCutaneous every 6 hours  midodrine 10 milliGRAM(s) Oral every 8 hours  pantoprazole   Suspension 40 milliGRAM(s) Oral daily  petrolatum white Ointment 1 Application(s) Topical every 12 hours  phenylephrine    Infusion 0.5 MICROgram(s)/kG/Min (12.1 mL/Hr) IV Continuous <Continuous>  polyethylene glycol 3350 17 Gram(s) Oral daily  senna Syrup 5 milliLiter(s) Oral daily    MEDICATIONS  (PRN):  acetaminophen   Tablet .. 650 milliGRAM(s) Oral every 6 hours PRN Temp greater or equal to 38C (100.4F)  fentaNYL    Injectable 25 MICROGram(s) IV Push every 8 hours PRN Severe Pain (7 - 10)  sodium chloride 0.9% lock flush 10 milliLiter(s) IV Push every 1 hour PRN Pre/post blood products, medications, blood draw, and to maintain line patency      09-16-21 @ 07:01  -  09-17-21 @ 07:00  --------------------------------------------------------  IN: 665 mL / OUT: 35 mL / NET: 630 mL    09-17-21 @ 07:01  -  09-17-21 @ 17:33  --------------------------------------------------------  IN: 191.5 mL / OUT: 0 mL / NET: 191.5 mL      PHYSICAL EXAM:      T(C): 37.7 (09-17-21 @ 15:48), Max: 37.9 (09-17-21 @ 05:00)  HR: 91 (09-17-21 @ 17:14) (86 - 102)  BP: 111/46 (09-17-21 @ 17:00) (82/44 - 124/52)  RR: 20 (09-17-21 @ 17:00) (16 - 24)  SpO2: 100% (09-17-21 @ 17:14) (99% - 100%)  Wt(kg): --  Lungs clear  Heart S1S2  Abd soft NT ND  Extremities:   tr edema                                    9.2    13.02 )-----------( 127      ( 17 Sep 2021 02:56 )             27.2     09-17    136  |  102  |  79<H>  ----------------------------<  102<H>  3.8   |  23  |  3.71<H>    Ca    8.1<L>      17 Sep 2021 02:56  Phos  4.7     09-17  Mg     2.4     09-17    TPro  4.6<L>  /  Alb  0.8<L>  /  TBili  9.7<H>  /  DBili  x   /  AST  121<H>  /  ALT  62  /  AlkPhos  989<H>  09-17      LIVER FUNCTIONS - ( 17 Sep 2021 02:56 )  Alb: 0.8 g/dL / Pro: 4.6 gm/dL / ALK PHOS: 989 U/L / ALT: 62 U/L / AST: 121 U/L / GGT: x           Creatinine Trend: 3.71<--, 3.49<--, 2.85<--, 4.37<--, 3.60<--, 2.68<--  Mode: CPAP with PS  FiO2: 30  PEEP: 5  PS: 5  MAP: 7          ASSESSMENT  LAUREN on CKD(4) - oliguric  Respiratory failure  Hypotension on low dose single pressor    PLAN  Hd for tomorrow; pending hemodynamic status  Overall prognosis is poor.               Jimbo Hutchinson MD

## 2021-09-18 NOTE — PROGRESS NOTE ADULT - PROBLEM SELECTOR PROBLEM 1
Acute respiratory failure with hypoxia
Acute respiratory failure with hypoxia
Closed fracture of right hip

## 2021-09-18 NOTE — PROGRESS NOTE ADULT - SUBJECTIVE AND OBJECTIVE BOX
[facility] NEPHROLOGY SERVICES, [facility]  NEPHROLOGY AND HYPERTENSION  [address]  [address]  [place] [address]  [phone]    MD ELSA SANTA MD ANDREY GONCHARUK, MD MADHU KORRAPATI, MD YELENA ROSENBERG, MD BINNY KOSHY, MD CHRISTOPHER CAPUTO, MD EDWARD BOVER, MD          Patient events noted  Daughter at bedside    MEDICATIONS  (STANDING):  aMIOdarone    Tablet 200 milliGRAM(s) Oral daily  chlorhexidine 0.12% Liquid 15 milliLiter(s) Oral Mucosa every 12 hours  chlorhexidine 2% Cloths 1 Application(s) Topical daily  dextrose 40% Gel 15 Gram(s) Oral once  dextrose 5%. 1000 milliLiter(s) (50 mL/Hr) IV Continuous <Continuous>  dextrose 5%. 1000 milliLiter(s) (100 mL/Hr) IV Continuous <Continuous>  dextrose 50% Injectable 25 Gram(s) IV Push once  dextrose 50% Injectable 12.5 Gram(s) IV Push once  dextrose 50% Injectable 25 Gram(s) IV Push once  fentaNYL   Infusion. 0.5 MICROgram(s)/kG/Hr (3.24 mL/Hr) IV Continuous <Continuous>  glucagon  Injectable 1 milliGRAM(s) IntraMuscular once  insulin lispro (ADMELOG) corrective regimen sliding scale   SubCutaneous every 6 hours  midodrine 10 milliGRAM(s) Oral every 8 hours  norepinephrine Infusion 0.05 MICROgram(s)/kG/Min (6.07 mL/Hr) IV Continuous <Continuous>  pantoprazole   Suspension 40 milliGRAM(s) Oral daily  petrolatum white Ointment 1 Application(s) Topical every 12 hours  phenylephrine    Infusion 0.5 MICROgram(s)/kG/Min (12.1 mL/Hr) IV Continuous <Continuous>  polyethylene glycol 3350 17 Gram(s) Oral daily  senna Syrup 5 milliLiter(s) Oral daily    MEDICATIONS  (PRN):  acetaminophen   Tablet .. 650 milliGRAM(s) Oral every 6 hours PRN Temp greater or equal to 38C (100.4F)  fentaNYL    Injectable 25 MICROGram(s) IV Push every 8 hours PRN Severe Pain (7 - 10)  sodium chloride 0.9% lock flush 10 milliLiter(s) IV Push every 1 hour PRN Pre/post blood products, medications, blood draw, and to maintain line patency      09-17-21 @ 07:01  -  09-18-21 @ 07:00  --------------------------------------------------------  IN: 1160.3 mL / OUT: 0 mL / NET: 1160.3 mL    09-18-21 @ 07:01  -  09-18-21 @ 21:29  --------------------------------------------------------  IN: 3069.7 mL / OUT: 0 mL / NET: 3069.7 mL      PHYSICAL EXAM:      T(C): 36.4 (09-18-21 @ 16:30), Max: 37.8 (09-18-21 @ 08:00)  HR: 104 (09-18-21 @ 21:06) (77 - 114)  BP: 75/44 (09-18-21 @ 20:30) (64/33 - 163/59)  RR: 24 (09-18-21 @ 20:46) (17 - 44)  SpO2: 95% (09-18-21 @ 21:06) (93% - 100%)  Wt(kg): --  Lungs clear  Heart S1S2  Abd soft NT ND  Extremities:   tr edema                                    8.8    12.41 )-----------( 134      ( 18 Sep 2021 03:38 )             26.1     09-18    133<L>  |  100  |  91<H>  ----------------------------<  87  3.9   |  21<L>  |  4.46<H>    Ca    8.1<L>      18 Sep 2021 03:38  Phos  5.6     09-18  Mg     2.3     09-18    TPro  4.4<L>  /  Alb  0.7<L>  /  TBili  11.3<H>  /  DBili  x   /  AST  110<H>  /  ALT  48  /  AlkPhos  963<H>  09-18      LIVER FUNCTIONS - ( 18 Sep 2021 03:38 )  Alb: 0.7 g/dL / Pro: 4.4 gm/dL / ALK PHOS: 963 U/L / ALT: 48 U/L / AST: 110 U/L / GGT: x           Creatinine Trend: 4.46<--, 3.71<--, 3.49<--, 2.85<--, 4.37<--, 3.60<--  Mode: AC/ CMV (Assist Control/ Continuous Mandatory Ventilation)  RR (machine): 18  TV (machine): 500  FiO2: 30  PEEP: 5  ITime: 0.8  MAP: 11  PIP: 23      Assessment   LAUREN CKD 4-5 ; HD dependent  Doing poorly    Plan:  Supportive care   No pressor escalationon    Jimbo Hutchinson MD

## 2021-09-18 NOTE — PROCEDURAL SAFETY CHECKLIST WITH OR WITHOUT SEDATION - NSPREPROCFT_GEN_ALL_CORE
Central  line insertion
Arterial Line
Central Line
Insertion of Dialysis Catheter
left radial arterial line insertion

## 2021-09-18 NOTE — PROVIDER CONTACT NOTE (EICU) - SITUATION
Pt s/p intubation
CXR request by ICU bedside MD for central line placement evaluation
bedside team requesting Fentanyl and Rocuronium order for intubated pt prior to planned procedure
Request for order
Pt on levophed 8mg in 250mL, now s/p central line placement. Request for 16mg in 250mL.

## 2021-09-18 NOTE — PROCEDURAL SAFETY CHECKLIST WITH OR WITHOUT SEDATION - NSPX2BRECORDED_GEN_ALL_CORE
Right Radial Arterial line
Triple Lumen Cental Line to Left IJ
Right Internal Jugular Dialysis Catheter
left internal jugular central line

## 2021-09-18 NOTE — CHART NOTE - NSCHARTNOTEFT_GEN_A_CORE
Spoke with daughter kimber rice at bedside. She stated that she wants her father not to suffer. Does not want any further aggressive measures. therefore moriah place DNR order in chart.     Hannah Mays, NP-C  critical care

## 2021-09-18 NOTE — PROGRESS NOTE ADULT - PROBLEM SELECTOR PLAN 2
sepsis worsening, Restart ABX, attempt to keep MAP  > 65  Overall prognosis is poor can use TLC check CXr

## 2021-09-18 NOTE — PROGRESS NOTE ADULT - SUBJECTIVE AND OBJECTIVE BOX
INTERVAL HPI/OVERNIGHT EVENTS:   HPI:  80 yo M community ambulator w/PMH of CKD, orthostatic hypotension, CABG more than 12 years ago without issue, HLD, COPD, occasional memory loss/confusion presents to the ED s/p fall. Per daughter pt at baseline, AAOx2 at baseline. Daughter is translating for pt and reports pt got his foot got stuck and fell. Fall Witnessed by grandson. No head-strike or LOC. Pt on ASA 81mg. Denies AC use. Denies fever/chills, cough, CP, SOB, N/V, abdominal pain or headache. Pt unable to ambulate. Pt main complaint is right hip pain     	No fever/chills, No photophobia/eye pain/changes in vision, No ear pain/sore throat/dysphagia, No chest pain/palpitations, no SOB/cough/wheeze/stridor, No abdominal pain, No N/V/D, no dysuria/frequency/discharge, No neck/back pain, no rash, no changes in neurological status/function.  (31 Aug 2021 17:35)    Pressor reqmts increasing now on 2 pressors, RIJ TLC placed urgently by me         PAST MEDICAL & SURGICAL HISTORY:  Glaucoma    Blindness of right eye    CAD (coronary artery disease)    HTN (hypertension)    Chronic kidney disease (CKD)    COPD without exacerbation    H/O coronary artery bypass surgery               ICU Vital Signs Last 24 Hrs  T(C): 37.6 (18 Sep 2021 12:16), Max: 38.1 (17 Sep 2021 19:12)  T(F): 99.6 (18 Sep 2021 12:16), Max: 100.6 (17 Sep 2021 19:12)  HR: 107 (18 Sep 2021 12:30) (77 - 114)  BP: 97/45 (18 Sep 2021 12:30) (64/33 - 163/59)  BP(mean): 62 (18 Sep 2021 12:30) (43 - 95)  ABP: --  ABP(mean): --  RR: 21 (18 Sep 2021 12:30) (16 - 30)  SpO2: 99% (18 Sep 2021 12:30) (93% - 100%)          I&O's Detail    17 Sep 2021 07:01  -  18 Sep 2021 07:00  --------------------------------------------------------  IN:    Enteral Tube Flush: 200 mL    IV PiggyBack: 50 mL    Nepro with Carb Steady: 560 mL    Phenylephrine: 350.3 mL  Total IN: 1160.3 mL    OUT:    Voided (mL): 0 mL  Total OUT: 0 mL    Total NET: 1160.3 mL      18 Sep 2021 07:01  -  18 Sep 2021 13:06  --------------------------------------------------------  IN:    Nepro with Carb Steady: 200 mL    Phenylephrine: 513.1 mL  Total IN: 713.1 mL    OUT:    FentaNYL: 0 mL  Total OUT: 0 mL    Total NET: 713.1 mL          Mode: AC/ CMV (Assist Control/ Continuous Mandatory Ventilation)  RR (machine): 18  TV (machine): 500  FiO2: 30  PEEP: 5  ITime: 1  MAP: 9  PIP: 21    CAPILLARY BLOOD GLUCOSE      POCT Blood Glucose.: 62 mg/dL (18 Sep 2021 11:46)  POCT Blood Glucose.: 88 mg/dL (18 Sep 2021 06:33)  POCT Blood Glucose.: 88 mg/dL (18 Sep 2021 00:10)  POCT Blood Glucose.: 99 mg/dL (18 Sep 2021 00:08)  POCT Blood Glucose.: 94 mg/dL (17 Sep 2021 17:21)  POCT Blood Glucose.: 76 mg/dL (17 Sep 2021 14:22)    PHYSICAL EXAM:    GENERAL:  intubated./seated  Lungs Rhonchi B/L  CVS S1 S2  ABD Nt/ND  ext edema  LYMPH: No lymphadenopathy noted        LABS:                        8.8    12.41 )-----------( 134      ( 18 Sep 2021 03:38 )             26.1      09-18    133<L>  |  100  |  91<H>  ----------------------------<  87  3.9   |  21<L>  |  4.46<H>    Ca    8.1<L>      18 Sep 2021 03:38  Phos  5.6     09-18  Mg     2.3     09-18    TPro  4.4<L>  /  Alb  0.7<L>  /  TBili  11.3<H>  /  DBili  x   /  AST  110<H>  /  ALT  48  /  AlkPhos  963<H>  09-18            RADIOLOGY & ADDITIONAL STUDIES:< from: US Abdomen Limited (09.16.21 @ 11:37) >  IMPRESSION:    No cholelithiasis or biliary ductal dilatation.        < end of copied text >        Assessment and Plan:    CRITICAL CARE TIME SPENT:

## 2021-09-18 NOTE — PROGRESS NOTE ADULT - SUBJECTIVE AND OBJECTIVE BOX
INTERVAL HPI/OVERNIGHT EVENTS:   HPI:  82 yo M community ambulator w/PMH of CKD, orthostatic hypotension, CABG more than 12 years ago without issue, HLD, COPD, occasional memory loss/confusion presents to the ED s/p fall. Per daughter pt at baseline, AAOx2 at baseline. Daughter is translating for pt and reports pt got his foot got stuck and fell. Fall Witnessed by grandson. No head-strike or LOC. Pt on ASA 81mg. Denies AC use. Denies fever/chills, cough, CP, SOB, N/V, abdominal pain or headache. Pt unable to ambulate. Pt main complaint is right hip pain     	No fever/chills, No photophobia/eye pain/changes in vision, No ear pain/sore throat/dysphagia, No chest pain/palpitations, no SOB/cough/wheeze/stridor, No abdominal pain, No N/V/D, no dysuria/frequency/discharge, No neck/back pain, no rash, no changes in neurological status/function.  (31 Aug 2021 17:35)      pressor reqmts incresing, now on levo and lianet, trahc na dPEG cancelled yesterday due to hemodynamic insatbility           PAST MEDICAL & SURGICAL HISTORY:  Glaucoma    Blindness of right eye    CAD (coronary artery disease)    HTN (hypertension)    Chronic kidney disease (CKD)    COPD without exacerbation    H/O coronary artery bypass surgery              ICU Vital Signs Last 24 Hrs  T(C): 37.8 (18 Sep 2021 08:00), Max: 38.1 (17 Sep 2021 19:12)  T(F): 100 (18 Sep 2021 08:00), Max: 100.6 (17 Sep 2021 19:12)  HR: 97 (18 Sep 2021 10:25) (77 - 114)  BP: 80/46 (18 Sep 2021 10:23) (64/33 - 127/79)  BP(mean): 58 (18 Sep 2021 10:23) (43 - 95)  ABP: --  ABP(mean): --  RR: 29 (18 Sep 2021 10:25) (16 - 30)  SpO2: 98% (18 Sep 2021 10:25) (93% - 100%)          I&O's Detail    17 Sep 2021 07:01  -  18 Sep 2021 07:00  --------------------------------------------------------  IN:    Enteral Tube Flush: 200 mL    IV PiggyBack: 50 mL    Nepro with Carb Steady: 560 mL    Phenylephrine: 350.3 mL  Total IN: 1160.3 mL    OUT:    Voided (mL): 0 mL  Total OUT: 0 mL    Total NET: 1160.3 mL      18 Sep 2021 07:01  -  18 Sep 2021 10:26  --------------------------------------------------------  IN:    Nepro with Carb Steady: 40 mL    Phenylephrine: 29.1 mL  Total IN: 69.1 mL    OUT:  Total OUT: 0 mL    Total NET: 69.1 mL          Mode: AC/ CMV (Assist Control/ Continuous Mandatory Ventilation)  RR (machine): 18  TV (machine): 500  FiO2: 30  PEEP: 5  ITime: 0.8  MAP: 7  PIP: 16    CAPILLARY BLOOD GLUCOSE      POCT Blood Glucose.: 88 mg/dL (18 Sep 2021 06:33)  POCT Blood Glucose.: 88 mg/dL (18 Sep 2021 00:10)  POCT Blood Glucose.: 99 mg/dL (18 Sep 2021 00:08)  POCT Blood Glucose.: 94 mg/dL (17 Sep 2021 17:21)  POCT Blood Glucose.: 76 mg/dL (17 Sep 2021 14:22)  POCT Blood Glucose.: 68 mg/dL (17 Sep 2021 12:23)  POCT Blood Glucose.: 64 mg/dL (17 Sep 2021 12:20)  POCT Blood Glucose.: 62 mg/dL (17 Sep 2021 11:49)  POCT Blood Glucose.: 62 mg/dL (17 Sep 2021 11:34)    PHYSICAL EXAM:    GENERAL:  intubated/sedated  HEENt No JVd  Lungs B/L air entry  CVS S1 S2 RRR  ABD NT/ND  EXt no edema      LABS:                        8.8    12.41 )-----------( 134      ( 18 Sep 2021 03:38 )             26.1      09-18    133<L>  |  100  |  91<H>  ----------------------------<  87  3.9   |  21<L>  |  4.46<H>    Ca    8.1<L>      18 Sep 2021 03:38  Phos  5.6     09-18  Mg     2.3     09-18    TPro  4.4<L>  /  Alb  0.7<L>  /  TBili  11.3<H>  /  DBili  x   /  AST  110<H>  /  ALT  48  /  AlkPhos  963<H>  09-18            RADIOLOGY & ADDITIONAL STUDIES:      Assessment and Plan:    CRITICAL CARE TIME SPENT:

## 2021-09-18 NOTE — CHART NOTE - NSCHARTNOTESELECT_GEN_ALL_CORE
Event Note
Orthopedics/Event Note
Orthopedics/Event Note
Event Note
GOC/Event Note
Nutrition Services
Nutrition Services
POCUS/Event Note

## 2021-09-18 NOTE — PROGRESS NOTE ADULT - PROBLEM SELECTOR PLAN 1
restar ABX- vanco, merropenem, pan CX, bilis increased but sono without dilated ducts. Will place TLC. did not tolearte HD. overall prgnosis is poor. will d/w family.
- Trach PEG today  -=watch off ABX  -lethargic and not following commands off sedation  did not require any blood last nigh

## 2021-09-18 NOTE — PROVIDER CONTACT NOTE (EICU) - REASON
Order request
order request by bedside MD ( eLert)
orders request by bedside team
requesting checking CXR for OGT placement

## 2021-09-18 NOTE — PROCEDURAL SAFETY CHECKLIST WITH OR WITHOUT SEDATION - NSPROCEDPERFORMDFREE_GEN_ALL_CORE
Central Line
left radial arterial line insertion
insertion of right Internal Jugular Dialysis Catheter
Arterial Line placement by Dr. Gill
Central line insertion

## 2021-09-18 NOTE — PROGRESS NOTE ADULT - PROVIDER SPECIALTY LIST ADULT
Critical Care
Hospitalist
Nephrology
Orthopedics
Orthopedics
Anesthesia
Critical Care
Hospitalist
Hospitalist
Nephrology
Orthopedics
Orthopedics
Critical Care
Critical Care
Hospitalist
Hospitalist
Nephrology
Orthopedics
Critical Care
Hospitalist
Nephrology
Nephrology
Orthopedics
Orthopedics
Critical Care

## 2021-09-18 NOTE — PROVIDER CONTACT NOTE (EICU) - ACTION/TREATMENT ORDERED:
Ok to use OGT - order placed
order for urgent CXR placed as requested by bedside  ICU team
orders placed as per bedside ICU MD request:  -Fentanyl 50 mcg bolus followed by Fentanyl infusion  -Rocuronium 50 mg bolus
E-alerted for hypotension SBP Of 80-85s likely due to precedex, pt was previously on midodrine now off, known to have an hx of orthostatic hypotension for which he is on midodrine at home, d/w bedside PA in agreement to restart midodrine 10mg po q8h w/ hold parameters for HR <60 or SBP >120
Levophed reordered at 16mg in 250mL concentration as requested.
Request for repeat CBC as prior specimen reported with insufficient blood content. Stat CBC order placed as requested, to be followed up by the bedside team.
Stat Fentanyl drip ordered as requested, 50mcg IVP x1 ordered for sedation.

## 2021-09-18 NOTE — PROGRESS NOTE ADULT - REASON FOR ADMISSION
Right hip fracture

## 2021-09-19 NOTE — DISCHARGE NOTE FOR THE EXPIRED PATIENT - HOSPITAL COURSE
Mr. Webber was an 81 year old male with a PMH of CKD 3, orthostatic hypotension, CAD s/p CABG, HLD, COPD, occasional memory loss/confusion presents to the ED s/p fall on daily asa. At that time pt sustained a right intertrochanteric hip fx with ABLA with large surrounding hematoma, causing hemorrhagic shock. Pt taken to OR on 9/1 for right IMN and was c/b intraop hypotension. Post-operative pt noted with continued worsening hemodynamic state and blood loss prompting continued multiple blood product transfusions, vasopressor support, and subsequent reintubation. Hospital course further complicated by acute on chronic renal failure requiring HD, encephalopathy, afib w rvr, continued anemia requiring blood transfusions, pseudomonas pneumonia, and inability to liberate pt  mechanical ventilation with multiple failed attempts requiring emergent intubation. Pt daughter Clary acting and HCP and updated throughout the process. Discussion regarding trach/peg ongoing at this time. Despite multiple interventions and tx of infection, pt noted with worsening septic shock with inability to tolerated HD sessions, despite vasopressor/midodrine support and escalating vasopressor requirements. Given the extensive complicated hospital course and poor prognosis, GOC discussion was had and pt daughter Clary on 9/18, and pt daughter stated she did not want him to further suffer prompting DNR status. Early in the AM hours of 9/19 at 0325, pt noted to be asystole on the monitor despite all interventions. Clary was at the bedside. Exam revealed unresponsive pt with fixed/dilated pupils, no s1/s2 and no chest rise noted. Pt declared at that time. RN called Vanessa (see reference number above), who stated pt would be evaluated for brain bank. Condolences offered to pt and family.

## 2021-09-19 NOTE — DISCHARGE NOTE FOR THE EXPIRED PATIENT - NS PATIENT DEATH CRITERIA
Unresponsive, Asystole, Pupils fixed/dilated, no s1/s1, no chest rise/Patient is dead based on Cardiopulmonary criteria including absent breath sounds, pulselessness and/or asystole

## 2021-09-22 DIAGNOSIS — E87.70 FLUID OVERLOAD, UNSPECIFIED: ICD-10-CM

## 2021-09-22 DIAGNOSIS — R04.0 EPISTAXIS: ICD-10-CM

## 2021-09-22 DIAGNOSIS — N18.4 CHRONIC KIDNEY DISEASE, STAGE 4 (SEVERE): ICD-10-CM

## 2021-09-22 DIAGNOSIS — Z95.1 PRESENCE OF AORTOCORONARY BYPASS GRAFT: ICD-10-CM

## 2021-09-22 DIAGNOSIS — D69.6 THROMBOCYTOPENIA, UNSPECIFIED: ICD-10-CM

## 2021-09-22 DIAGNOSIS — I95.1 ORTHOSTATIC HYPOTENSION: ICD-10-CM

## 2021-09-22 DIAGNOSIS — I48.91 UNSPECIFIED ATRIAL FIBRILLATION: ICD-10-CM

## 2021-09-22 DIAGNOSIS — D68.9 COAGULATION DEFECT, UNSPECIFIED: ICD-10-CM

## 2021-09-22 DIAGNOSIS — R11.10 VOMITING, UNSPECIFIED: ICD-10-CM

## 2021-09-22 DIAGNOSIS — E43 UNSPECIFIED SEVERE PROTEIN-CALORIE MALNUTRITION: ICD-10-CM

## 2021-09-22 DIAGNOSIS — E87.6 HYPOKALEMIA: ICD-10-CM

## 2021-09-22 DIAGNOSIS — I12.9 HYPERTENSIVE CHRONIC KIDNEY DISEASE WITH STAGE 1 THROUGH STAGE 4 CHRONIC KIDNEY DISEASE, OR UNSPECIFIED CHRONIC KIDNEY DISEASE: ICD-10-CM

## 2021-09-22 DIAGNOSIS — Z99.11 DEPENDENCE ON RESPIRATOR [VENTILATOR] STATUS: ICD-10-CM

## 2021-09-22 DIAGNOSIS — I24.8 OTHER FORMS OF ACUTE ISCHEMIC HEART DISEASE: ICD-10-CM

## 2021-09-22 DIAGNOSIS — D62 ACUTE POSTHEMORRHAGIC ANEMIA: ICD-10-CM

## 2021-09-22 DIAGNOSIS — M79.89 OTHER SPECIFIED SOFT TISSUE DISORDERS: ICD-10-CM

## 2021-09-22 DIAGNOSIS — Z23 ENCOUNTER FOR IMMUNIZATION: ICD-10-CM

## 2021-09-22 DIAGNOSIS — Z91.81 HISTORY OF FALLING: ICD-10-CM
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
## 2021-09-22 DIAGNOSIS — I95.3 HYPOTENSION OF HEMODIALYSIS: ICD-10-CM

## 2021-09-22 DIAGNOSIS — H54.61 UNQUALIFIED VISUAL LOSS, RIGHT EYE, NORMAL VISION LEFT EYE: ICD-10-CM

## 2021-09-22 DIAGNOSIS — S72.141A DISPLACED INTERTROCHANTERIC FRACTURE OF RIGHT FEMUR, INITIAL ENCOUNTER FOR CLOSED FRACTURE: ICD-10-CM

## 2021-09-22 DIAGNOSIS — S70.01XA CONTUSION OF RIGHT HIP, INITIAL ENCOUNTER: ICD-10-CM

## 2021-09-22 DIAGNOSIS — I97.88 OTHER INTRAOPERATIVE COMPLICATIONS OF THE CIRCULATORY SYSTEM, NOT ELSEWHERE CLASSIFIED: ICD-10-CM

## 2021-09-22 DIAGNOSIS — R04.1 HEMORRHAGE FROM THROAT: ICD-10-CM

## 2021-09-22 DIAGNOSIS — R57.8 OTHER SHOCK: ICD-10-CM

## 2021-09-22 DIAGNOSIS — I95.89 OTHER HYPOTENSION: ICD-10-CM

## 2021-09-22 DIAGNOSIS — E87.2 ACIDOSIS: ICD-10-CM

## 2021-09-22 DIAGNOSIS — R65.21 SEVERE SEPSIS WITH SEPTIC SHOCK: ICD-10-CM

## 2021-09-22 DIAGNOSIS — R04.2 HEMOPTYSIS: ICD-10-CM
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
## 2021-09-22 DIAGNOSIS — J15.1 PNEUMONIA DUE TO PSEUDOMONAS: ICD-10-CM

## 2021-09-22 DIAGNOSIS — G93.41 METABOLIC ENCEPHALOPATHY: ICD-10-CM

## 2021-09-22 DIAGNOSIS — Z99.2 DEPENDENCE ON RENAL DIALYSIS: ICD-10-CM

## 2021-09-22 DIAGNOSIS — E78.5 HYPERLIPIDEMIA, UNSPECIFIED: ICD-10-CM

## 2021-09-22 DIAGNOSIS — E87.0 HYPEROSMOLALITY AND HYPERNATREMIA: ICD-10-CM

## 2021-09-22 DIAGNOSIS — S72.009A FRACTURE OF UNSPECIFIED PART OF NECK OF UNSPECIFIED FEMUR, INITIAL ENCOUNTER FOR CLOSED FRACTURE: ICD-10-CM

## 2021-09-22 DIAGNOSIS — J96.01 ACUTE RESPIRATORY FAILURE WITH HYPOXIA: ICD-10-CM

## 2021-09-22 DIAGNOSIS — Z66 DO NOT RESUSCITATE: ICD-10-CM

## 2021-09-22 DIAGNOSIS — H40.9 UNSPECIFIED GLAUCOMA: ICD-10-CM

## 2021-09-22 DIAGNOSIS — A41.9 SEPSIS, UNSPECIFIED ORGANISM: ICD-10-CM

## 2021-09-22 DIAGNOSIS — I25.10 ATHEROSCLEROTIC HEART DISEASE OF NATIVE CORONARY ARTERY WITHOUT ANGINA PECTORIS: ICD-10-CM

## 2021-09-22 DIAGNOSIS — E87.1 HYPO-OSMOLALITY AND HYPONATREMIA: ICD-10-CM

## 2021-09-22 DIAGNOSIS — Z79.82 LONG TERM (CURRENT) USE OF ASPIRIN: ICD-10-CM

## 2021-09-22 DIAGNOSIS — J44.0 CHRONIC OBSTRUCTIVE PULMONARY DISEASE WITH (ACUTE) LOWER RESPIRATORY INFECTION: ICD-10-CM

## 2021-09-22 DIAGNOSIS — R45.1 RESTLESSNESS AND AGITATION: ICD-10-CM

## 2021-09-22 DIAGNOSIS — N17.0 ACUTE KIDNEY FAILURE WITH TUBULAR NECROSIS: ICD-10-CM

## 2022-08-24 NOTE — PROCEDURAL SAFETY CHECKLIST WITH OR WITHOUT SEDATION - NSPREALLERGYSED_GEN_ALL_CORE
ED Provider Note  [facility] EMERGENCY DEPARTMENT ([facility])  8/24/22      History     Chief Complaint   Patient presents with     Leg Swelling     HPI  Carlos Manuel Meeks is a 58 year old male with PMH significant for an NICM s/p LVAD placement (4/2021), CHF, atrial fibrillation on Coumadin, and HIV (CD4 781, 06/16/2022) who presents to the ED for evaluation of left leg swelling.  Patient states he woke up yesterday morning to left leg swelling.  He took an extra water pill last night.  He has otherwise been taking his meds normally.  He has not missed any Coumadin doses.  No chest pain, shortness of breath, or trouble breathing.  No abdominal pain.  Patient endorses some left ankle pain with movement.  Denies any other acute complaints.     Past Medical History  Past Medical History:   Diagnosis Date     Anemia      Anxiety      Back pain      CHF (congestive heart failure) (H)      Congestive heart failure (H)      Depression      Gastroesophageal reflux disease with esophagitis      Gout      Hives      LVAD (left ventricular assist device) present (H)      Melena      NICM (nonischemic cardiomyopathy) (H)      NSVT (nonsustained ventricular tachycardia) (H)      Obesity      Obesity      SHLOMO (obstructive sleep apnea)      Paroxysmal atrial fibrillation (H)      Personal history of DVT (deep vein thrombosis)     internal jugular     RVF (right ventricular failure) (H)      Past Surgical History:   Procedure Laterality Date     CAPSULE/PILL CAM ENDOSCOPY N/A 12/7/2021    Procedure: IMAGING PROCEDURE, GI TRACT, INTRALUMINAL, VIA CAPSULE;  Surgeon: Chris Mcmanus MD;  Location: [facility] GI     COLONOSCOPY N/A 4/13/2021    Procedure: COLONOSCOPY, WITH POLYPECTOMY AND BIOPSY;  Surgeon: Rizwan Smart MD;  Location: [facility] GI     CV INTRA AORTIC BALLOON N/A 4/19/2021    Procedure: CV INTRA-AORTIC BALLOON PUMP INSERTION;  Surgeon: Tello Fairbanks MD;  
Location:  HEART CARDIAC CATH LAB     CV RIGHT HEART CATH MEASUREMENTS RECORDED N/A 01/29/2021    Procedure: Right Heart Cath;  Surgeon: Tello Fairbanks MD;  Location:  HEART CARDIAC CATH LAB     CV RIGHT HEART CATH MEASUREMENTS RECORDED N/A 3/11/2021    Procedure: Right Heart Cath;  Surgeon: Brian Decker MD;  Location:  HEART CARDIAC CATH LAB     CV RIGHT HEART CATH MEASUREMENTS RECORDED N/A 4/19/2021    Procedure: Right Heart Cath;  Surgeon: Tello Fairbanks MD;  Location:  HEART CARDIAC CATH LAB     CV RIGHT HEART CATH MEASUREMENTS RECORDED N/A 5/3/2021    Procedure: Right Heart Cath;  Surgeon: Tello Fairbanks MD;  Location:  HEART CARDIAC CATH LAB     CV RIGHT HEART CATH MEASUREMENTS RECORDED N/A 7/21/2021    Procedure: CV RIGHT HEART CATH;  Surgeon: Zenon Krause MD;  Location:  HEART CARDIAC CATH LAB     CV RIGHT HEART CATH MEASUREMENTS RECORDED N/A 2/22/2022    Procedure: Right Heart Cath;  Surgeon: Tello Fairbanks MD;  Location:  HEART CARDIAC CATH LAB     ESOPHAGOSCOPY, GASTROSCOPY, DUODENOSCOPY (EGD), COMBINED N/A 4/13/2021    Procedure: ESOPHAGOGASTRODUODENOSCOPY (EGD);  Surgeon: Rizwan Smart MD;  Location:  GI     ESOPHAGOSCOPY, GASTROSCOPY, DUODENOSCOPY (EGD), COMBINED N/A 10/18/2021    Procedure: ESOPHAGOGASTRODUODENOSCOPY, WITH FINE NEEDLE ASPIRATION BIOPSY, WITH ENDOSCOPIC ULTRASOUND GUIDANCE;  Surgeon: Guru Norbert Oconnor MD;  Location: [facility] OR     INSERT VENTRICULAR ASSIST DEVICE LEFT (HEARTMATE II) N/A 4/20/2021    Procedure: MEDIAN STERNOTOMY WITH CARDIOPULMONARY BYPASS. INSERTION OF LEFT VENTRICULAR ASSIST DEVICE (HEARTMATE III). INTRAOPERATIVE TRANSESOPHAGEAL ECHOCARDIOGRAM PER ANESTHESIA.;  Surgeon: Charlie Min MD;  Location: [facility] OR     IR CVC TUNNEL REMOVAL RIGHT  01/22/2021     PICC TRIPLE LUMEN PLACEMENT Left 01/21/2021    Basilic 53cm     ULTRAFILTRATION CHF Left 03/09/2021    
basilic     albuterol (PROAIR HFA/PROVENTIL HFA/VENTOLIN HFA) 108 (90 Base) MCG/ACT inhaler  albuterol (PROVENTIL) (2.5 MG/3ML) 0.083% neb solution  allopurinol (ZYLOPRIM) 100 MG tablet  bictegravir-emtricitabine-tenofovir (BIKTARVY) -25 MG per tablet  bumetanide (BUMEX) 2 MG tablet  digoxin (LANOXIN) 125 MCG tablet  metoprolol succinate ER (TOPROL XL) 50 MG 24 hr tablet  multivitamin, therapeutic (THERA-VIT) TABS tablet  omeprazole (PRILOSEC) 20 MG DR capsule  oxyCODONE-acetaminophen (PERCOCET)  MG per tablet  potassium chloride ER (KLOR-CON M) 20 MEQ CR tablet  predniSONE (DELTASONE) 20 MG tablet  sacubitril-valsartan (ENTRESTO) 24-26 MG per tablet  vitamin C (ASCORBIC ACID) 250 MG tablet  warfarin ANTICOAGULANT (COUMADIN) 2.5 MG tablet      Allergies   Allergen Reactions     Blood-Group Specific Substance Other (See Comments)     Patient has a history of a clinically significant antibody against RBC antigens.  A delay in compatible RBCs may occur.     Hydromorphone Anaphylaxis and Swelling     Patient had ? Swelling of uvula when given dilaudid, unclear if caused by dilaudid or ativan, patient tolerates Vicodin ok      Lorazepam Swelling     Family History  Family History   Problem Relation Age of Onset     Heart Disease Mother      Heart Failure Mother      Heart Disease Father      Heart Failure Father      Social History   Social History     Tobacco Use     Smoking status: Former Smoker     Packs/day: 0.50     Quit date: 2014     Years since quittin.8     Smokeless tobacco: Never Used     Tobacco comment: quit in , then started again for 11 years and quit in    Substance Use Topics     Alcohol use: Not Currently     Drug use: Never      Past medical history, past surgical history, medications, allergies, family history, and social history were reviewed with the patient. No additional pertinent items.       Review of Systems   Respiratory: Negative for shortness of breath.  
  Cardiovascular: Negative for chest pain.   Gastrointestinal: Negative for abdominal pain.   Musculoskeletal: Positive for arthralgias (left ankle).   All other systems reviewed and are negative.    A complete review of systems was performed with pertinent positives and negatives noted in the HPI, and all other systems negative.    Physical Exam   Pulse: 65  Temp: 98.3  F (36.8  C)  Resp: 18  Weight: 133.8 kg (295 lb)  SpO2: 98 %  Physical Exam  Constitutional:       Appearance: He is well-developed. He is obese.   HENT:      Head: Normocephalic and atraumatic.   Cardiovascular:      Heart sounds: Normal heart sounds.      Comments: Mechanical LVAD sound.  Pulmonary:      Effort: Pulmonary effort is normal. No respiratory distress.      Breath sounds: No wheezing.   Abdominal:      General: There is no distension.      Palpations: Abdomen is soft.      Tenderness: There is no abdominal tenderness. There is no rebound.      Comments: LVAD driveline in place.   Musculoskeletal:      Cervical back: Normal range of motion and neck supple.      Right lower leg: No edema.      Left lower leg: No edema.      Comments: No pitting edema of either leg.  Mild swelling of the left ankle.  Mild tenderness on the medial malleoli or side of the left ankle.  No obvious deformity.  Normal range of motion.  Normal distal capillary refill.  Normal warmth in both eyes.   Skin:     General: Skin is warm.   Neurological:      Mental Status: He is alert and oriented to person, place, and time.   Psychiatric:         Behavior: Behavior normal.         Thought Content: Thought content normal.         ED Course     10:49 AM  The patient was seen and examined by Marissa Vincent MD in Room ED06.     Procedures       The medical record was reviewed and interpreted.  Current labs reviewed and interpreted.  Previous labs reviewed and interpreted.     Results for orders placed or performed during the hospital encounter of 08/24/22   US 
done
Lower Extremity Venous Duplex Left     Status: None    Narrative    EXAMINATION: DOPPLER VENOUS ULTRASOUND OF THE LEFT LOWER EXTREMITY,  8/24/2022 11:26 AM     COMPARISON: 1/22/2022.    HISTORY: Left leg swelling; evaluate for DVT    TECHNIQUE:  Gray-scale evaluation with compression, spectral flow, and  color Doppler assessment of the deep venous system of the left leg  from groin to knee, and then at the ankle.    FINDINGS:  In the left lower extremity, the common femoral, femoral, popliteal  and posterior tibial veins demonstrate normal compressibility and  blood flow. Subcutaneous edema.      Impression    IMPRESSION:  1.  No evidence left lower extremity deep venous thrombosis.    I have personally reviewed the examination and initial interpretation  and I agree with the findings.    GAL VANN MD         SYSTEM ID:  [id number]   XR Ankle Port Left G/E 3 Views     Status: None    Narrative    3 views left ankle radiographs 8/24/2022 1:08 PM    History: left ankle pain/swelling     Additional History from EMR: History of LVAD. Swelling and pain in his  left leg since yesterday with some swelling in right leg.    Comparison: None    Findings:  PortableAP, oblique, and lateral  views of the left ankle were  obtained.     Small ossific fragment immediately inferior to the distal tibial and  lateral lateral malleoli, as best seen on oblique view. Additional  scattered areas of well defined ossification within the ankle mortise,  for example in the medial clear space, which are favored to represent  degenerative change versus sequela of prior trauma.    No definite fractures are identified.    Ankle mortise and syndesmosis are congruent on this non-weight bearing  images.    Achilles tendon insertional and plantar calcaneal enthesopathy.     Soft tissue swelling surrounding the ankle.      Impression    Impression:  Ossific densities distal to the medial and lateral malleoli. Given no  prior imaging for comparison, 
done
this is of uncertain clinical  significance, but may represent a age-indeterminate avulsion fractures  versus sequelae of prior trauma. Recommend clinical correlation.    I have personally reviewed the examination and initial interpretation  and I agree with the findings.    JUTTA ELLERMANN, MD         SYSTEM ID:  [id number]   [place] Draw     Status: None    Narrative    The following orders were created for panel order [place] Draw.  Procedure                               Abnormality         Status                     ---------                               -----------         ------                     Extra Blue Top Tube[[id number]]                              Final result               Extra Red Top Tube[[id number]]                               Final result               Extra Green Top (Lithium...[[id number]]                      Final result               Extra Purple Top Tube[[id number]]                            Final result                 Please view results for these tests on the individual orders.   Extra Blue Top Tube     Status: None   Result Value Ref Range    Hold Specimen JIC    Extra Red Top Tube     Status: None   Result Value Ref Range    Hold Specimen JIC    Extra Green Top (Lithium Heparin) Tube     Status: None   Result Value Ref Range    Hold Specimen JIC    Extra Purple Top Tube     Status: None   Result Value Ref Range    Hold Specimen JIC    INR     Status: Abnormal   Result Value Ref Range    INR 2.20 (H) 0.85 - 1.15   Basic metabolic panel     Status: Abnormal   Result Value Ref Range    Creatinine 1.46 (H) 0.67 - 1.17 mg/dL    Sodium 141 136 - 145 mmol/L    Potassium 4.2 3.4 - 5.3 mmol/L    Urea Nitrogen 22.0 (H) 6.0 - 20.0 mg/dL    Chloride 104 98 - 107 mmol/L    Carbon Dioxide (CO2) 26 22 - 29 mmol/L    Anion Gap 11 7 - 15 mmol/L    Glucose 113 (H) 70 - 99 mg/dL    GFR Estimate 55 (L) >60 mL/min/1.73m2    Calcium 9.0 8.6 - 10.0 mg/dL   CBC with platelets and differential     Status: 
Abnormal   Result Value Ref Range    WBC Count 8.4 4.0 - 11.0 10e3/uL    RBC Count 5.04 4.40 - 5.90 10e6/uL    Hemoglobin 12.2 (L) 13.3 - 17.7 g/dL    Hematocrit 39.3 (L) 40.0 - 53.0 %    MCV 78 78 - 100 fL    MCH 24.2 (L) 26.5 - 33.0 pg    MCHC 31.0 (L) 31.5 - 36.5 g/dL    RDW 19.5 (H) 10.0 - 15.0 %    Platelet Count 409 150 - 450 10e3/uL    % Neutrophils 70 %    % Lymphocytes 19 %    % Monocytes 8 %    % Eosinophils 2 %    % Basophils 1 %    % Immature Granulocytes 0 %    NRBCs per 100 WBC 0 <1 /100    Absolute Neutrophils 5.9 1.6 - 8.3 10e3/uL    Absolute Lymphocytes 1.6 0.8 - 5.3 10e3/uL    Absolute Monocytes 0.7 0.0 - 1.3 10e3/uL    Absolute Eosinophils 0.2 0.0 - 0.7 10e3/uL    Absolute Basophils 0.1 0.0 - 0.2 10e3/uL    Absolute Immature Granulocytes 0.0 <=0.4 10e3/uL    Absolute NRBCs 0.0 10e3/uL   EKG 12-lead, tracing only     Status: None   Result Value Ref Range    Systolic Blood Pressure  mmHg    Diastolic Blood Pressure  mmHg    Ventricular Rate 86 BPM    Atrial Rate 312 BPM    FL Interval  ms    QRS Duration 98 ms     ms    QTc 502 ms    P Axis  degrees    R AXIS 191 degrees    T Axis 130 degrees    Interpretation ECG       Atrial fibrillation  Septal infarct , age undetermined  Possible Lateral infarct , age undetermined  Inferior infarct , possibly acute  ** ** ACUTE MI / STEMI ** **  Consider right ventricular involvement in acute inferior infarct  Abnormal ECG  Unconfirmed report - interpretation of this ECG is computer generated - see medical record for final interpretation    Confirmed by - EMERGENCY ROOM, PHYSICIAN ([id number]),  FRANKIE MCKOY ([id number]) on 8/24/2022 2:58:08 PM     CBC with platelets differential     Status: Abnormal    Narrative    The following orders were created for panel order CBC with platelets differential.  Procedure                               Abnormality         Status                     ---------                               -----------         ------              
       CBC with platelets and d...[[id number]]  Abnormal            Final result                 Please view results for these tests on the individual orders.     Medications - No data to display           Results for orders placed or performed during the hospital encounter of 08/24/22   [place] Draw     Status: None (In process)    Narrative    The following orders were created for panel order [place] Draw.  Procedure                               Abnormality         Status                     ---------                               -----------         ------                     Extra Blue Top Tube[[id number]]                              In process                 Extra Red Top Tube[[id number]]                               In process                 Extra Green Top (Lithium...[[id number]]                      In process                 Extra Purple Top Tube[[id number]]                            In process                   Please view results for these tests on the individual orders.     Medications - No data to display     Assessments & Plan (with Medical Decision Making)   Patient is a 58-year-old male with a known history of an LVAD who presents to the ER due to some pain and swelling of the left ankle.  Patient with no signs of heart failure as the swelling and pain is more just in the ankle versus bilateral.  No pitting edema.  We obtained an x-ray which was normal.  Patient's ultrasound is negative for DVT.  Patient's labs are stable.  Plan will be to discharge home with outpatient follow-up    I have reviewed the nursing notes. I have reviewed the findings, diagnosis, plan and need for follow up with the patient.    New Prescriptions    No medications on file       Final diagnoses:   Acute left ankle pain     IMarlene, am serving as a trained medical scribe to document services personally performed by Marissa Vincent MD based on the provider's statements to me on August 24, 2022.  This document 
has been checked and approved by the attending provider.    I, Marissa Vincent MD, was physically present and have reviewed and verified the accuracy of this note documented by Marlene Rasmussen, medical scribe.      --  Marissa JIMENEZ [facility] EMERGENCY DEPARTMENT  8/24/2022     Marissa Vincent MD  08/24/22 [id number]    
done

## 2022-09-21 NOTE — PROCEDURAL SAFETY CHECKLIST WITH OR WITHOUT SEDATION - NSRESOLVEDISCREP_GEN_ALL_CORE
Please dose Coumadin    INR:  2.2    Current Dose:  6mg at bedtime    **He received his Eliquis, please advise      
Pt did receive 30 day Eliquis, no co pay issues. He states his approval for It is good for the next year. Pt notified of dosing instructions, verbalized understanding.  
done

## 2023-01-10 NOTE — ED PROVIDER NOTE - CARE PLAN
General Sunscreen Counseling: I recommended a broad spectrum sunscreen with a SPF of 15 or higher.  I explained that SPF 30 sunscreens block approximately 97 percent of the sun's harmful rays.  Sunscreens should be applied at least 15 minutes prior to expected sun exposure and then every 2 hours after that as long as sun exposure continues. If swimming or exercising sunscreen should be reapplied every 45 minutes to an hour after getting wet or sweating.  One ounce, or the equivalent of a shot glass full of sunscreen, is adequate to protect the skin not covered by a bathing suit. I also recommended a lip balm with a sunscreen as well. Sun protective clothing can be used in lieu of sunscreen but must be worn the entire time you are exposed to the sun's rays. Detail Level: Generalized Principal Discharge DX:	Eye pain

## 2023-02-05 NOTE — H&P ADULT - NSHPPHYSICALEXAM_GEN_ALL_CORE
Extra-ocular movement intact, eyes are clear b/l
Vital Signs Last 24 Hrs  T(C): 36.7 (11 Nov 2020 15:24), Max: 37.1 (11 Nov 2020 12:46)  T(F): 98 (11 Nov 2020 15:24), Max: 98.7 (11 Nov 2020 12:46)  HR: 80 (11 Nov 2020 15:24) (80 - 89)  BP: 121/73 (11 Nov 2020 15:24) (78/46 - 175/72)  BP(mean): --  RR: 19 (11 Nov 2020 15:24) (16 - 20)  SpO2: 100% (11 Nov 2020 15:24) (98% - 100%)

## 2023-02-28 NOTE — DISCHARGE NOTE FOR THE EXPIRED PATIENT - NAME OF PERSON OFFERED
Clary Dillard Alar Island Pedicle Flap Text: The defect edges were debeveled with a #15 scalpel blade.  Given the location of the defect, shape of the defect and the proximity to the alar rim an island pedicle advancement flap was deemed most appropriate.  Using a sterile surgical marker, an appropriate advancement flap was drawn incorporating the defect, outlining the appropriate donor tissue and placing the expected incisions within the nasal ala running parallel to the alar rim. The area thus outlined was incised with a #15 scalpel blade.  The skin margins were undermined minimally to an appropriate distance in all directions around the primary defect and laterally outward around the island pedicle utilizing iris scissors.  There was minimal undermining beneath the pedicle flap.

## 2023-04-05 NOTE — PROGRESS NOTE ADULT - NUTRITIONAL ASSESSMENT
This patient has been assessed with a concern for Malnutrition and has been determined to have a diagnosis/diagnoses of Severe protein-calorie malnutrition.    This patient is being managed with:   Diet NPO with Tube Feed-  Tube Feeding Modality: Orogastric  Nepro with Carb Steady  Total Volume for 24 Hours (mL): 960  Continuous  Starting Tube Feed Rate {mL per Hour}: 30  Increase Tube Feed Rate by (mL): 5     Every 12 hours  Until Goal Tube Feed Rate (mL per Hour): 40  Tube Feed Duration (in Hours): 24  Tube Feed Start Time: 18:00  Free Water Flush Instructions:  200 ml q 8 hours via OGT  Entered: Sep 15 2021  3:17PM    Diet NPO with Tube Feed-  Tube Feeding Modality: Orogastric  Nepro with Carb Steady  Total Volume for 24 Hours (mL): 720  Continuous  Starting Tube Feed Rate {mL per Hour}: 30  Increase Tube Feed Rate by (mL): 5     Every 12 hours  Until Goal Tube Feed Rate (mL per Hour): 30  Tube Feed Duration (in Hours): 24  Tube Feed Start Time: 17:00  Liquid Protein Supplement     Qty per Day:  1  Entered: Sep 10 2021  3:18PM    The following pending diet order is being considered for treatment of Severe protein-calorie malnutrition:null
This patient has been assessed with a concern for Malnutrition and has been determined to have a diagnosis/diagnoses of Severe protein-calorie malnutrition.    This patient is being managed with:   Diet NPO with Tube Feed-  Tube Feeding Modality: Orogastric  Nepro with Carb Steady  Total Volume for 24 Hours (mL): 720  Continuous  Starting Tube Feed Rate {mL per Hour}: 30  Increase Tube Feed Rate by (mL): 5     Every 12 hours  Until Goal Tube Feed Rate (mL per Hour): 30  Tube Feed Duration (in Hours): 24  Tube Feed Start Time: 17:00  Liquid Protein Supplement     Qty per Day:  1  Entered: Sep 10 2021  3:18PM    
PRINCIPAL DISCHARGE DIAGNOSIS  Diagnosis: Left leg cellulitis  Assessment and Plan of Treatment: Take the prescribed antibiotic medicine you are given as directed until it is gone. Take it even if you feel better. It treats the infection and stops it from returning. Not taking all the medicine can make future infections hard to treat. Keep the infected area clean. When possible, raise the infected area above the level of your heart. This helps keep swelling down. Apply clean bandages as advised. Wash your hands often to prevent spreading the infection. In the future, wash your hands before and after you touch cuts, scratches, or bandages. This will help prevent infection.   Call your doctor or returnt o the emergency room or call 911 immediately if you have any of the following: Difficulty or pain when moving the joints above or below the infected area, Discharge or pus draining from the area, rever of 100.4°F (38°C) or higher, or as directed by your healthcare provider, pain that gets worse in or around the infected site, redness that gets worse in or around the infected area, particularly if the area of redness expands to a wider area, shaking chills, swelling of the infected area, vomiting.    
This patient has been assessed with a concern for Malnutrition and has been determined to have a diagnosis/diagnoses of Severe protein-calorie malnutrition.    This patient is being managed with:   Diet NPO after Midnight-     NPO Start Date: 16-Sep-2021   NPO Start Time: 23:59  Entered: Sep 16 2021  4:44PM    Diet NPO with Tube Feed-  Tube Feeding Modality: Orogastric  Nepro with Carb Steady  Total Volume for 24 Hours (mL): 960  Continuous  Starting Tube Feed Rate {mL per Hour}: 30  Increase Tube Feed Rate by (mL): 5     Every 12 hours  Until Goal Tube Feed Rate (mL per Hour): 40  Tube Feed Duration (in Hours): 24  Tube Feed Start Time: 18:00  Free Water Flush Instructions:  200 ml q 8 hours via OGT  Entered: Sep 15 2021  3:17PM

## 2024-04-25 NOTE — H&P ADULT - PROBLEM SELECTOR PLAN 1
Imaging: XR demonstates R hip fracture  Pain control  NWB R LE, bedrest  FU labs/imaging  Medical clearance/optimization for OR  Will discuss with attending not applicable
